# Patient Record
Sex: FEMALE | Race: WHITE | NOT HISPANIC OR LATINO | Employment: UNEMPLOYED | URBAN - METROPOLITAN AREA
[De-identification: names, ages, dates, MRNs, and addresses within clinical notes are randomized per-mention and may not be internally consistent; named-entity substitution may affect disease eponyms.]

---

## 2017-03-23 ENCOUNTER — APPOINTMENT (OUTPATIENT)
Dept: URGENT CARE | Facility: MEDICAL CENTER | Age: 62
End: 2017-03-23
Payer: OTHER MISCELLANEOUS

## 2017-03-23 PROCEDURE — 99213 OFFICE O/P EST LOW 20 MIN: CPT

## 2017-03-29 ENCOUNTER — ALLSCRIPTS OFFICE VISIT (OUTPATIENT)
Dept: OTHER | Facility: OTHER | Age: 62
End: 2017-03-29

## 2017-03-30 ENCOUNTER — TRANSCRIBE ORDERS (OUTPATIENT)
Dept: URGENT CARE | Age: 62
End: 2017-03-30

## 2017-03-30 ENCOUNTER — APPOINTMENT (OUTPATIENT)
Dept: URGENT CARE | Age: 62
End: 2017-03-30
Payer: OTHER MISCELLANEOUS

## 2017-03-30 ENCOUNTER — HOSPITAL ENCOUNTER (OUTPATIENT)
Dept: RADIOLOGY | Age: 62
Discharge: HOME | End: 2017-03-30
Attending: PHYSICIAN ASSISTANT
Payer: OTHER MISCELLANEOUS

## 2017-03-30 DIAGNOSIS — T14.90XA INJURY: ICD-10-CM

## 2017-03-30 DIAGNOSIS — T14.90XA INJURY: Primary | ICD-10-CM

## 2017-03-30 PROCEDURE — 99213 OFFICE O/P EST LOW 20 MIN: CPT | Performed by: PREVENTIVE MEDICINE

## 2017-03-30 PROCEDURE — 73030 X-RAY EXAM OF SHOULDER: CPT

## 2017-04-07 ENCOUNTER — APPOINTMENT (OUTPATIENT)
Dept: URGENT CARE | Age: 62
End: 2017-04-07
Payer: OTHER MISCELLANEOUS

## 2017-04-07 PROCEDURE — 99213 OFFICE O/P EST LOW 20 MIN: CPT | Performed by: FAMILY MEDICINE

## 2017-04-27 ENCOUNTER — ALLSCRIPTS OFFICE VISIT (OUTPATIENT)
Dept: OTHER | Facility: OTHER | Age: 62
End: 2017-04-27

## 2017-04-27 DIAGNOSIS — R11.0 NAUSEA: ICD-10-CM

## 2017-04-27 DIAGNOSIS — M75.82 OTHER SHOULDER LESIONS, LEFT SHOULDER: ICD-10-CM

## 2017-05-04 ENCOUNTER — ALLSCRIPTS OFFICE VISIT (OUTPATIENT)
Dept: OTHER | Facility: OTHER | Age: 62
End: 2017-05-04

## 2017-05-08 RX ORDER — ALPRAZOLAM 0.25 MG/1
0.25 TABLET ORAL 3 TIMES DAILY PRN
COMMUNITY
End: 2020-02-18 | Stop reason: SDUPTHER

## 2017-05-08 RX ORDER — LISINOPRIL 20 MG/1
20 TABLET ORAL 2 TIMES DAILY
COMMUNITY
End: 2020-01-14

## 2017-05-08 RX ORDER — PRAVASTATIN SODIUM 20 MG
20 TABLET ORAL
COMMUNITY
End: 2020-05-19 | Stop reason: SDUPTHER

## 2017-05-08 RX ORDER — BUTALBITAL, ACETAMINOPHEN AND CAFFEINE 50; 325; 40 MG/1; MG/1; MG/1
1 TABLET ORAL EVERY 4 HOURS PRN
COMMUNITY
End: 2018-03-28 | Stop reason: SDUPTHER

## 2017-05-10 ENCOUNTER — ANESTHESIA EVENT (OUTPATIENT)
Dept: PERIOP | Facility: HOSPITAL | Age: 62
End: 2017-05-10
Payer: COMMERCIAL

## 2017-05-10 RX ORDER — AMLODIPINE BESYLATE 5 MG/1
5 TABLET ORAL DAILY
Status: ON HOLD | COMMUNITY
End: 2017-05-11 | Stop reason: CLARIF

## 2017-05-11 ENCOUNTER — HOSPITAL ENCOUNTER (OUTPATIENT)
Facility: HOSPITAL | Age: 62
Setting detail: OUTPATIENT SURGERY
Discharge: HOME | End: 2017-05-11
Attending: INTERNAL MEDICINE | Admitting: INTERNAL MEDICINE
Payer: COMMERCIAL

## 2017-05-11 ENCOUNTER — ANESTHESIA (OUTPATIENT)
Dept: PERIOP | Facility: HOSPITAL | Age: 62
End: 2017-05-11
Payer: COMMERCIAL

## 2017-05-11 ENCOUNTER — GENERIC CONVERSION - ENCOUNTER (OUTPATIENT)
Dept: OTHER | Facility: OTHER | Age: 62
End: 2017-05-11

## 2017-05-11 VITALS
DIASTOLIC BLOOD PRESSURE: 70 MMHG | BODY MASS INDEX: 24.41 KG/M2 | RESPIRATION RATE: 18 BRPM | HEART RATE: 68 BPM | HEIGHT: 60 IN | OXYGEN SATURATION: 100 % | TEMPERATURE: 97.8 F | WEIGHT: 124.34 LBS | SYSTOLIC BLOOD PRESSURE: 138 MMHG

## 2017-05-11 RX ORDER — SODIUM CHLORIDE, SODIUM LACTATE, POTASSIUM CHLORIDE, CALCIUM CHLORIDE 600; 310; 30; 20 MG/100ML; MG/100ML; MG/100ML; MG/100ML
100 INJECTION, SOLUTION INTRAVENOUS CONTINUOUS
Status: DISCONTINUED | OUTPATIENT
Start: 2017-05-11 | End: 2017-05-11 | Stop reason: HOSPADM

## 2017-05-11 RX ORDER — LIDOCAINE HYDROCHLORIDE 10 MG/ML
INJECTION, SOLUTION INFILTRATION; PERINEURAL AS NEEDED
Status: DISCONTINUED | OUTPATIENT
Start: 2017-05-11 | End: 2017-05-11 | Stop reason: SURG

## 2017-05-11 RX ORDER — PROPOFOL 10 MG/ML
INJECTION, EMULSION INTRAVENOUS AS NEEDED
Status: DISCONTINUED | OUTPATIENT
Start: 2017-05-11 | End: 2017-05-11 | Stop reason: SURG

## 2017-05-11 RX ADMIN — PROPOFOL 50 MG: 10 INJECTION, EMULSION INTRAVENOUS at 10:25

## 2017-05-11 RX ADMIN — SODIUM CHLORIDE, SODIUM LACTATE, POTASSIUM CHLORIDE, AND CALCIUM CHLORIDE 100 ML/HR: .6; .31; .03; .02 INJECTION, SOLUTION INTRAVENOUS at 09:52

## 2017-05-11 RX ADMIN — LIDOCAINE HYDROCHLORIDE 50 MG: 10 INJECTION, SOLUTION INFILTRATION; PERINEURAL at 10:20

## 2017-05-11 RX ADMIN — PROPOFOL 150 MG: 10 INJECTION, EMULSION INTRAVENOUS at 10:20

## 2017-05-11 RX ADMIN — PROPOFOL 50 MG: 10 INJECTION, EMULSION INTRAVENOUS at 10:30

## 2017-05-16 ENCOUNTER — HOSPITAL ENCOUNTER (OUTPATIENT)
Dept: RADIOLOGY | Facility: MEDICAL CENTER | Age: 62
Discharge: HOME | End: 2017-05-16
Payer: COMMERCIAL

## 2017-05-16 DIAGNOSIS — R11.0 NAUSEA: ICD-10-CM

## 2017-05-16 PROCEDURE — 76705 ECHO EXAM OF ABDOMEN: CPT

## 2017-05-18 ENCOUNTER — ALLSCRIPTS OFFICE VISIT (OUTPATIENT)
Dept: OTHER | Facility: OTHER | Age: 62
End: 2017-05-18

## 2017-05-19 ENCOUNTER — ALLSCRIPTS OFFICE VISIT (OUTPATIENT)
Dept: OTHER | Facility: OTHER | Age: 62
End: 2017-05-19

## 2017-06-15 ENCOUNTER — ALLSCRIPTS OFFICE VISIT (OUTPATIENT)
Dept: OTHER | Facility: OTHER | Age: 62
End: 2017-06-15

## 2017-07-12 ENCOUNTER — ALLSCRIPTS OFFICE VISIT (OUTPATIENT)
Dept: OTHER | Facility: OTHER | Age: 62
End: 2017-07-12

## 2017-08-03 ENCOUNTER — ALLSCRIPTS OFFICE VISIT (OUTPATIENT)
Dept: OTHER | Facility: OTHER | Age: 62
End: 2017-08-03

## 2017-08-28 ENCOUNTER — ANESTHESIA EVENT (OUTPATIENT)
Dept: PERIOP | Facility: HOSPITAL | Age: 62
End: 2017-08-28
Payer: OTHER MISCELLANEOUS

## 2017-08-28 RX ORDER — PROCHLORPERAZINE MALEATE 10 MG
10 TABLET ORAL EVERY 8 HOURS PRN
COMMUNITY
End: 2018-06-22 | Stop reason: SDUPTHER

## 2017-08-29 ENCOUNTER — HOSPITAL ENCOUNTER (OUTPATIENT)
Facility: HOSPITAL | Age: 62
Setting detail: OUTPATIENT SURGERY
Discharge: HOME | End: 2017-08-29
Attending: ORTHOPAEDIC SURGERY | Admitting: ORTHOPAEDIC SURGERY
Payer: OTHER MISCELLANEOUS

## 2017-08-29 ENCOUNTER — ANESTHESIA (OUTPATIENT)
Dept: PERIOP | Facility: HOSPITAL | Age: 62
End: 2017-08-29
Payer: OTHER MISCELLANEOUS

## 2017-08-29 VITALS
DIASTOLIC BLOOD PRESSURE: 84 MMHG | HEART RATE: 68 BPM | SYSTOLIC BLOOD PRESSURE: 159 MMHG | WEIGHT: 120 LBS | BODY MASS INDEX: 23.56 KG/M2 | RESPIRATION RATE: 18 BRPM | OXYGEN SATURATION: 98 % | TEMPERATURE: 97.8 F | HEIGHT: 60 IN

## 2017-08-29 PROBLEM — S46.212A RUPTURE OF LEFT BICEPS TENDON: Status: ACTIVE | Noted: 2017-08-29

## 2017-08-29 PROBLEM — M75.122 COMPLETE TEAR OF LEFT ROTATOR CUFF: Status: ACTIVE | Noted: 2017-08-29

## 2017-08-29 PROCEDURE — C1713 ANCHOR/SCREW BN/BN,TIS/BN: HCPCS | Performed by: ORTHOPAEDIC SURGERY

## 2017-08-29 DEVICE — IMPLANTABLE DEVICE: Type: IMPLANTABLE DEVICE | Site: SHOULDER | Status: FUNCTIONAL

## 2017-08-29 RX ORDER — MORPHINE SULFATE 2 MG/ML
2 INJECTION, SOLUTION INTRAMUSCULAR; INTRAVENOUS EVERY 4 HOURS PRN
Status: DISCONTINUED | OUTPATIENT
Start: 2017-08-29 | End: 2017-08-29 | Stop reason: HOSPADM

## 2017-08-29 RX ORDER — ONDANSETRON 2 MG/ML
INJECTION INTRAMUSCULAR; INTRAVENOUS AS NEEDED
Status: DISCONTINUED | OUTPATIENT
Start: 2017-08-29 | End: 2017-08-29 | Stop reason: SURG

## 2017-08-29 RX ORDER — ACETAMINOPHEN 325 MG/1
650 TABLET ORAL EVERY 6 HOURS PRN
Status: DISCONTINUED | OUTPATIENT
Start: 2017-08-29 | End: 2017-08-29 | Stop reason: HOSPADM

## 2017-08-29 RX ORDER — ALBUTEROL SULFATE 2.5 MG/3ML
2.5 SOLUTION RESPIRATORY (INHALATION) AS NEEDED
Status: DISCONTINUED | OUTPATIENT
Start: 2017-08-29 | End: 2017-08-29 | Stop reason: HOSPADM

## 2017-08-29 RX ORDER — ONDANSETRON 2 MG/ML
4 INJECTION INTRAMUSCULAR; INTRAVENOUS ONCE AS NEEDED
Status: DISCONTINUED | OUTPATIENT
Start: 2017-08-29 | End: 2017-08-29 | Stop reason: HOSPADM

## 2017-08-29 RX ORDER — FENTANYL CITRATE 50 UG/ML
INJECTION, SOLUTION INTRAMUSCULAR; INTRAVENOUS AS NEEDED
Status: DISCONTINUED | OUTPATIENT
Start: 2017-08-29 | End: 2017-08-29 | Stop reason: SURG

## 2017-08-29 RX ORDER — TRAMADOL HYDROCHLORIDE 50 MG/1
50 TABLET ORAL EVERY 6 HOURS SCHEDULED
Status: DISCONTINUED | OUTPATIENT
Start: 2017-08-29 | End: 2017-08-29 | Stop reason: HOSPADM

## 2017-08-29 RX ORDER — PROPOFOL 10 MG/ML
INJECTION, EMULSION INTRAVENOUS AS NEEDED
Status: DISCONTINUED | OUTPATIENT
Start: 2017-08-29 | End: 2017-08-29 | Stop reason: SURG

## 2017-08-29 RX ORDER — DIPHENHYDRAMINE HYDROCHLORIDE 50 MG/ML
12.5 INJECTION INTRAMUSCULAR; INTRAVENOUS ONCE AS NEEDED
Status: DISCONTINUED | OUTPATIENT
Start: 2017-08-29 | End: 2017-08-29 | Stop reason: HOSPADM

## 2017-08-29 RX ORDER — OXYCODONE HYDROCHLORIDE 5 MG/1
5 TABLET ORAL EVERY 4 HOURS PRN
Status: DISCONTINUED | OUTPATIENT
Start: 2017-08-29 | End: 2017-08-29 | Stop reason: HOSPADM

## 2017-08-29 RX ORDER — FENTANYL CITRATE/PF 50 MCG/ML
25 SYRINGE (ML) INJECTION
Status: DISCONTINUED | OUTPATIENT
Start: 2017-08-29 | End: 2017-08-29 | Stop reason: HOSPADM

## 2017-08-29 RX ORDER — ROPIVACAINE HYDROCHLORIDE 5 MG/ML
INJECTION, SOLUTION EPIDURAL; INFILTRATION; PERINEURAL AS NEEDED
Status: DISCONTINUED | OUTPATIENT
Start: 2017-08-29 | End: 2017-08-29 | Stop reason: SURG

## 2017-08-29 RX ORDER — OXYCODONE HYDROCHLORIDE 5 MG/1
10 TABLET ORAL EVERY 4 HOURS PRN
Status: DISCONTINUED | OUTPATIENT
Start: 2017-08-29 | End: 2017-08-29 | Stop reason: HOSPADM

## 2017-08-29 RX ORDER — PROMETHAZINE HYDROCHLORIDE 25 MG/ML
6.25 INJECTION, SOLUTION INTRAMUSCULAR; INTRAVENOUS AS NEEDED
Status: DISCONTINUED | OUTPATIENT
Start: 2017-08-29 | End: 2017-08-29 | Stop reason: HOSPADM

## 2017-08-29 RX ORDER — MIDAZOLAM HYDROCHLORIDE 1 MG/ML
INJECTION INTRAMUSCULAR; INTRAVENOUS AS NEEDED
Status: DISCONTINUED | OUTPATIENT
Start: 2017-08-29 | End: 2017-08-29 | Stop reason: SURG

## 2017-08-29 RX ORDER — OXYCODONE HYDROCHLORIDE AND ACETAMINOPHEN 5; 325 MG/1; MG/1
1 TABLET ORAL EVERY 4 HOURS PRN
Qty: 60 TABLET | Refills: 0 | Status: CANCELLED | OUTPATIENT
Start: 2017-08-29 | End: 2017-09-08

## 2017-08-29 RX ORDER — EPHEDRINE SULFATE 50 MG/ML
INJECTION, SOLUTION INTRAVENOUS AS NEEDED
Status: DISCONTINUED | OUTPATIENT
Start: 2017-08-29 | End: 2017-08-29 | Stop reason: SURG

## 2017-08-29 RX ORDER — ROCURONIUM BROMIDE 10 MG/ML
INJECTION, SOLUTION INTRAVENOUS AS NEEDED
Status: DISCONTINUED | OUTPATIENT
Start: 2017-08-29 | End: 2017-08-29 | Stop reason: SURG

## 2017-08-29 RX ORDER — LIDOCAINE HYDROCHLORIDE 10 MG/ML
INJECTION, SOLUTION INFILTRATION; PERINEURAL AS NEEDED
Status: DISCONTINUED | OUTPATIENT
Start: 2017-08-29 | End: 2017-08-29 | Stop reason: SURG

## 2017-08-29 RX ORDER — ACETAMINOPHEN 325 MG/1
650 TABLET ORAL EVERY 6 HOURS PRN
Status: DISCONTINUED | OUTPATIENT
Start: 2017-08-29 | End: 2017-08-29

## 2017-08-29 RX ORDER — METOCLOPRAMIDE HYDROCHLORIDE 5 MG/ML
INJECTION INTRAMUSCULAR; INTRAVENOUS AS NEEDED
Status: DISCONTINUED | OUTPATIENT
Start: 2017-08-29 | End: 2017-08-29 | Stop reason: SURG

## 2017-08-29 RX ORDER — ONDANSETRON 2 MG/ML
4 INJECTION INTRAMUSCULAR; INTRAVENOUS EVERY 6 HOURS PRN
Status: DISCONTINUED | OUTPATIENT
Start: 2017-08-29 | End: 2017-08-29 | Stop reason: HOSPADM

## 2017-08-29 RX ORDER — SODIUM CHLORIDE, SODIUM LACTATE, POTASSIUM CHLORIDE, CALCIUM CHLORIDE 600; 310; 30; 20 MG/100ML; MG/100ML; MG/100ML; MG/100ML
125 INJECTION, SOLUTION INTRAVENOUS CONTINUOUS
Status: DISCONTINUED | OUTPATIENT
Start: 2017-08-29 | End: 2017-08-29 | Stop reason: HOSPADM

## 2017-08-29 RX ORDER — GLYCOPYRROLATE 0.2 MG/ML
INJECTION INTRAMUSCULAR; INTRAVENOUS AS NEEDED
Status: DISCONTINUED | OUTPATIENT
Start: 2017-08-29 | End: 2017-08-29 | Stop reason: SURG

## 2017-08-29 RX ADMIN — DEXAMETHASONE SODIUM PHOSPHATE 2 MG: 10 INJECTION INTRAMUSCULAR; INTRAVENOUS at 11:52

## 2017-08-29 RX ADMIN — LIDOCAINE HYDROCHLORIDE 20 MG: 10 INJECTION, SOLUTION INFILTRATION; PERINEURAL at 12:22

## 2017-08-29 RX ADMIN — EPHEDRINE SULFATE 5 MG: 50 INJECTION, SOLUTION INTRAMUSCULAR; INTRAVENOUS; SUBCUTANEOUS at 13:18

## 2017-08-29 RX ADMIN — FENTANYL CITRATE 50 MCG: 50 INJECTION INTRAMUSCULAR; INTRAVENOUS at 11:46

## 2017-08-29 RX ADMIN — GLYCOPYRROLATE 0.4 MG: 0.2 INJECTION, SOLUTION INTRAMUSCULAR; INTRAVENOUS at 14:40

## 2017-08-29 RX ADMIN — FENTANYL CITRATE 25 MCG: 50 INJECTION INTRAMUSCULAR; INTRAVENOUS at 15:56

## 2017-08-29 RX ADMIN — OXYCODONE HYDROCHLORIDE 5 MG: 5 TABLET ORAL at 16:53

## 2017-08-29 RX ADMIN — DEXAMETHASONE SODIUM PHOSPHATE 5 MG: 10 INJECTION INTRAMUSCULAR; INTRAVENOUS at 12:22

## 2017-08-29 RX ADMIN — NEOSTIGMINE METHYLSULFATE 3 MG: 1 INJECTION, SOLUTION INTRAMUSCULAR; INTRAVENOUS; SUBCUTANEOUS at 14:40

## 2017-08-29 RX ADMIN — ROCURONIUM BROMIDE 40 MG: 10 INJECTION, SOLUTION INTRAVENOUS at 12:22

## 2017-08-29 RX ADMIN — SODIUM CHLORIDE, SODIUM LACTATE, POTASSIUM CHLORIDE, AND CALCIUM CHLORIDE 125 ML/HR: .6; .31; .03; .02 INJECTION, SOLUTION INTRAVENOUS at 11:33

## 2017-08-29 RX ADMIN — EPHEDRINE SULFATE 5 MG: 50 INJECTION, SOLUTION INTRAMUSCULAR; INTRAVENOUS; SUBCUTANEOUS at 12:44

## 2017-08-29 RX ADMIN — PROPOFOL 200 MG: 10 INJECTION, EMULSION INTRAVENOUS at 12:22

## 2017-08-29 RX ADMIN — ROPIVACAINE HYDROCHLORIDE 15 ML: 5 INJECTION, SOLUTION EPIDURAL; INFILTRATION; PERINEURAL at 11:52

## 2017-08-29 RX ADMIN — EPHEDRINE SULFATE 10 MG: 50 INJECTION, SOLUTION INTRAMUSCULAR; INTRAVENOUS; SUBCUTANEOUS at 13:39

## 2017-08-29 RX ADMIN — CEFAZOLIN SODIUM 1000 MG: 1 SOLUTION INTRAVENOUS at 12:13

## 2017-08-29 RX ADMIN — EPHEDRINE SULFATE 5 MG: 50 INJECTION, SOLUTION INTRAMUSCULAR; INTRAVENOUS; SUBCUTANEOUS at 13:09

## 2017-08-29 RX ADMIN — FENTANYL CITRATE 50 MCG: 50 INJECTION INTRAMUSCULAR; INTRAVENOUS at 11:48

## 2017-08-29 RX ADMIN — METOCLOPRAMIDE HYDROCHLORIDE 5 MG: 5 INJECTION INTRAMUSCULAR; INTRAVENOUS at 12:32

## 2017-08-29 RX ADMIN — ONDANSETRON 4 MG: 2 INJECTION INTRAMUSCULAR; INTRAVENOUS at 14:41

## 2017-08-29 RX ADMIN — FENTANYL CITRATE 50 MCG: 50 INJECTION INTRAMUSCULAR; INTRAVENOUS at 15:04

## 2017-08-29 RX ADMIN — FENTANYL CITRATE 25 MCG: 50 INJECTION INTRAMUSCULAR; INTRAVENOUS at 15:45

## 2017-08-29 RX ADMIN — FENTANYL CITRATE 50 MCG: 50 INJECTION INTRAMUSCULAR; INTRAVENOUS at 13:58

## 2017-08-29 RX ADMIN — MIDAZOLAM HYDROCHLORIDE 2 MG: 1 INJECTION, SOLUTION INTRAMUSCULAR; INTRAVENOUS at 11:46

## 2017-08-29 RX ADMIN — SODIUM CHLORIDE, SODIUM LACTATE, POTASSIUM CHLORIDE, AND CALCIUM CHLORIDE: .6; .31; .03; .02 INJECTION, SOLUTION INTRAVENOUS at 13:30

## 2017-09-14 ENCOUNTER — ALLSCRIPTS OFFICE VISIT (OUTPATIENT)
Dept: OTHER | Facility: OTHER | Age: 62
End: 2017-09-14

## 2017-09-14 DIAGNOSIS — Z47.89 ENCOUNTER FOR OTHER ORTHOPEDIC AFTERCARE: ICD-10-CM

## 2017-09-19 ENCOUNTER — ALLSCRIPTS OFFICE VISIT (OUTPATIENT)
Dept: OTHER | Facility: OTHER | Age: 62
End: 2017-09-19

## 2017-10-17 ENCOUNTER — ALLSCRIPTS OFFICE VISIT (OUTPATIENT)
Dept: OTHER | Facility: OTHER | Age: 62
End: 2017-10-17

## 2017-10-19 ENCOUNTER — ALLSCRIPTS OFFICE VISIT (OUTPATIENT)
Dept: OTHER | Facility: OTHER | Age: 62
End: 2017-10-19

## 2017-10-19 DIAGNOSIS — M75.50 BURSITIS OF SHOULDER: ICD-10-CM

## 2017-10-19 DIAGNOSIS — Z47.89 ENCOUNTER FOR OTHER ORTHOPEDIC AFTERCARE (CODE): ICD-10-CM

## 2017-10-19 NOTE — PROGRESS NOTES
Chief Complaint  botox inj for chronic migraine      Current Meds  1  ALPRAZolam 0 25 MG Oral Tablet; Therapy: (Recorded:26Mar2014) to Recorded  2  B Complex CAPS; take 1 capsule daily; Therapy: (Recorded:19Sep2017) to Recorded  3  Botox 200 UNIT Injection Solution Reconstituted; INJECT UP  UNITS ONCE   EVERY 3 MONTHS IN  MD OFFICE; Therapy: 71UIX5159 to (Last Rx:23Nov2016)  Requested for: 94IFW3110 Ordered  4  Butalbital-APAP-Caffeine -40 MG Oral Tablet; TAKE 1OR 2 TABLETS EVERY 4   HOURS FOR SEVERE HEADACHE, MAX 8 PER WEEK; Therapy: 91LLL3757 to (Evaluate:19Oct2017); Last Rx:19Sep2017 Ordered  5  Calcium-Magnesium-Zinc 333-133-5 MG Oral Tablet; Therapy: (Recorded:19Sep2017) to Recorded  6  Cyclobenzaprine HCl - 10 MG Oral Tablet; 1 tab q hs;   Therapy: 19Sep2017 to (Fabio Ocampo)  Requested for: 05AGN8319; Last   Rx:03Oct2017 Ordered  7  Dexamethasone 0 5 MG Oral Tablet; TAKE 1 TABLET EVERY MORNING; Therapy: 09Wah3810 to (Evaluate:24Sep2017)  Requested for: 96Raj6919; Last   Rx:19Sep2017 Ordered  8  Dexamethasone 2 MG Oral Tablet; 1 tab daily in am for 3 day; Therapy: 70QME2372 to (21 )  Requested for: 05YWK6048; Last   Rx:03Oct2017 Ordered  9  EpiPen 2-Tima 0 3 MG/0 3ML Injection Solution Auto-injector; Therapy: 68LAR5109 to Recorded  10  Lisinopril 20 MG Oral Tablet; TAKE 1 TABLET TWICE DAILY; Therapy: (Recorded:19Sep2017) to Recorded  11  Metoprolol Tartrate 25 MG Oral Tablet; Take 1/2 tab bid; Therapy: (Recorded:19Sep2017) to Recorded  12  Omega 3 CAPS; TAKE AS DIRECTED; Therapy: (Recorded:19Sep2017) to Recorded  13  Pravastatin Sodium 20 MG Oral Tablet; Therapy: (Recorded:26Mar2014) to Recorded  14  Prochlorperazine Maleate 10 MG Oral Tablet; TAKE 1 TABLET 3 TIMES DAILY; Therapy: (Recorded:19Sep2017) to Recorded  15  Sucralfate 1 GM Oral Tablet; take 1/2 hour prior to taking decadron;     Therapy: 43OGR7907 to (Evaluate:72Vzr4134)  Requested for: 27KJK1206; Last    Rx:03Oct2017 Ordered  16  Vicodin 5-300 MG Oral Tablet; TAKE 1 TO 2 TABLETS EVERY 4 TO 6 HOURS AS    NEEDED FOR PAIN;    Therapy: 57Rrr5212 to (Evaluate:78Ocu5122); Last Rx:46Gka7522 Ordered  17  Vitamin D TABS; Take 1 tablet daily; Therapy: (Recorded:56Rze1502) to Recorded    Allergies  1  Advil TABS  2  Ibuprofen 200 TABS  3  iodine  4  Naproxen Sodium TABS  5  Eggs    Vitals   Recorded: 98CQB0667 08:28AM   Temperature 01 7 F   Systolic 352   Diastolic 80     Procedure  Procedure: Headache botox injection  Indication: Chronic migraine headache  Risks, benefits and alternatives were discussed with the patient  We discussed possible complications, including infection,-- bleeding-- and-- allergic reaction  Written consent was obtained prior to the procedure  The site was prepped with an alcohol swab  Anesthesia: No anesthesia was needed  Procedure Note: The patient was placed in the upright position  200 --Mml of Botox-A and  EMG guidance was not used in identifying the injection site  5 unit(s) was injected into the procerus muscle  5 unit(s) was injected into the  right  muscle  5 unit(s) was injected into the  left  muscle  10 unit(s) was injected into the  right frontalis muscle  10 unit(s) was injected into the  left frontalis muscle  25 unit(s) was injected into the  right temporalis muscle  25 unit(s) was injected into the  left temporalis muscle  15 unit(s) was injected into the  right occipitalis muscle  15 unit(s) was injected into the  left occipitalis muscle  10 unit(s) was injected into the  right cervical paraspinal muscle  10 unit(s) was injected into the  left cervical paraspinal muscle  15 unit(s) was injected into the  right trapezius muscle  15 unit(s) was injected into the  left trapezius muscle  45 units on the left scalp, medically necessary  A total of 200units were used  A total of 0units were discarded     Botox Lot:  Lot number: [id number] -- Expiration date: APR 2020  (200 UNITS/VIALS)   Patient Status:  the patient tolerated the procedure well  Complications:  there were no complications  Follow-up in the office in 3 month(s)  100 UNITS/ 2CC SALINE X2      Assessment  1  Chronic migraine without aura (346 70) (G43 709)    Plan   Cervicalgia, Chronic migraine without aura    · Start: Diclofenac Sodium 1 % Transdermal Gel (Voltaren); Apply small amount to affected  area TID prn pain  Rx By: Melissa Glass; Dispense: 0 Days ; #:1 X 100 GM Tube; Refill: 0;For: Cervicalgia,   Chronic migraine without aura; FABIANA = N; Print Rx  Chronic migraine without aura    · Administered: Botox 200 UNIT Injection Solution Reconstituted  Rx By: Deyvi COBOS;For: Chronic migraine without aura; Dose of 200 UNIT;   Intramuscular; FABIANA = N; Administered by: Yousuf La MA: 10/17/2017 8:40:00 AM   · Chemodenervation of muscles innervated by facial, trigeminal, c-spine, accessory  nerves - POC; Status:Need Information - Financial Authorization; Requested  ZYR:[id number];   Perform: In Office; Due:14Qac7827; Ordered; For:Chronic migraine without aura; Ordered   By:Renee Jenkins;  Chronic migraine without aura, Migraine, unspecified, not intractable, without status  migrainosus    · Renew: Butalbital-APAP-Caffeine -40 MG Oral Tablet; TAKE 1OR 2 TABLETS  EVERY 4 HOURS FOR SEVERE HEADACHE, MAX 8 PER WEEK  Rx By: Melissa Glass; Dispense: 30 Days ; #:15 Tablet; Refill: 0;For: Chronic migraine   without aura, Migraine, unspecified, not intractable, without status migrainosus; FABIANA =   N; Print Rx; Msg to Pharmacy: to fill in november 2017    Follow-up visit in 3 months Evaluation and Treatment  Follow-up  Status: Hold For - Scheduling  Requested for: 41HJD6953  Ordered; For: Chronic migraine without aura;   Ordered By: Melissa Glass  Performed:   Due: 86VZN1853     Discussion/Summary    I updated the patient in the [place] Drug Monitoring Program appears to be getting Fioricet from multiple providers, at least 2 others  We will need to limit prescription of this medication to her in the future  gel tid prn left neck pain  was given the option to f/u with Dr Prema Espitia or Angelique Boothe PA-C, however she prefers the [facility] since it is closer  Future Appointments    Date/Time Provider Specialty Site   04/05/2018 08:30 AM Lisebth Diego MD Neurology [place] [address]   10/19/2017 08:50 AM Madelyn Bosworth, DO Orthopedic Surgery [facility] MEDICAL [facility]   11/27/2017 10:30 AM Cyrus Staples MD Obstetrics/Gynecology [facility]     Signatures   Electronically signed by :  Lorraine Monaco, [facility]; Oct 17 2017  9:38AM EST                       (Author)    Electronically signed by : Rudy Clarke MD; Oct 18 2017  2:12PM EST                       (Author)

## 2017-10-20 ENCOUNTER — GENERIC CONVERSION - ENCOUNTER (OUTPATIENT)
Dept: OTHER | Facility: OTHER | Age: 62
End: 2017-10-20

## 2017-10-20 NOTE — PROGRESS NOTES
Assessment  1  Rotator cuff tendinitis, left (726 10) (M75 82)   2  Migraine, unspecified, not intractable, without status migrainosus (346 90) (G43 909)   3  Subacromial bursitis (726 19) (M75 50)    Plan  Subacromial bursitis    · Ketorolac Tromethamine 10 MG Oral Tablet; TAKE 1 TABLET EVERY 8 HOURS AS  NEEDED   · *1 - [facility]  *  Status: Active  Requested for: 39MRK3447  Care Summary provided  : Yes    Discussion/Summary    Right shoulder rotator cuff repairContinue with physical therapy we have discharged the immobilizer brace  I prescribed her some Toradol she does have some issues with her GI upset with anti-inflammatories a told her to try a if she has any GI upset she is to stop immediately  She is taking medicine with food  I have only given her 9 pills I have told her not to take all 9 in a row but to have it later if needed  Follow up with us in 4 weeks  Chief Complaint  1  Shoulder Pain    Post-Op  HPI: Patient comes in with regards to her left shoulder she had a rotator cuff repair subacromial decompression  She is doing well but she still has some pain she has some irritation from wearing the sling for such a long period time  She has some migraines  Active Problems  1  AC (acromioclavicular) joint arthritis (716 91) (M19 019)   2  Aftercare following other surgery of musculoskeletal system (V58 78) (Z47 89)   3  Anxiety (300 00) (F41 9)   4  Arthritis (716 90) (M19 90)   5  Biceps tendinitis, left (726 12) (M75 22)   6  Cervical radiculopathy (723 4) (M54 12)   7  Cervicalgia (723 1) (M54 2)   8  Chronic migraine without aura (346 70) (G43 709)   9  Degeneration of cervical intervertebral disc (722 4) (M50 90)   10  Depression (Symptom) (311)   11  Dizziness (780 4) (R42)   12  Elevated transaminase level (790 4) (R74 0)   13  Encounter for gynecological examination without abnormal finding (V72 31) (Z01 419)   14   Encounter for screening mammogram for malignant neoplasm of breast (V76 12)    (Z12 31)   15  Herniated cervical disc (722 0) (M50 20)   16  Hyperlipoproteinemia (272 4) (E78 5)   17  Hypertension (401 9) (I10)   18  Insomnia (780 52) (G47 00)   19  Iron deficiency anemia (280 9) (D50 9)   20  Lower back pain (724 2) (M54 5)   21  Memory changes (780 93) (R41 3)   22  Migraine, unspecified, not intractable, without status migrainosus (346 90) (G43 909)   23  Myofascial pain syndrome (729 1) (M79 1)   24  Nausea (787 02) (R11 0)   25  Neck pain (723 1) (M54 2)   26  Osteoarthritis of left glenohumeral joint (715 91) (M19 012)   27  Palpitations (785 1) (R00 2)   28  Postconcussion syndrome (310 2) (F07 81)   29  Rotator cuff tendinitis, left (726 10) (M75 82)   30  Rupture of biceps tendon, left, initial encounter (840 8) (S46 112A)   31  Subacromial bursitis (726 19) (M75 50)   32  Tendinitis of left rotator cuff (726 10) (M75 82)    Social History   · Denied: History of Alcohol Use (History)   · Caffeine Use   · Denied: History of Drug Use   · Education history   · BA   · Marital History - Currently    · Never A Smoker   · Occupation   ·     Current Meds   1  ALPRAZolam 0 25 MG Oral Tablet; Therapy: (Recorded:26Mar2014) to Recorded   2  B Complex CAPS; take 1 capsule daily; Therapy: (Recorded:95Hvx3201) to Recorded   3  Botox 200 UNIT Injection Solution Reconstituted; INJECT UP  UNITS ONCE   EVERY 3 MONTHS IN  MD OFFICE; Therapy: 54GQD2031 to (Last Rx:23Nov2016)  Requested for: 62TWC0413 Ordered   4  Butalbital-APAP-Caffeine -40 MG Oral Tablet; TAKE 1OR 2 TABLETS EVERY 4   HOURS FOR SEVERE HEADACHE, MAX 8 PER WEEK; Therapy: 03Fiv3107 to (Evaluate:16Nov2017); Last Rx:17Oct2017 Ordered   5  Calcium-Magnesium-Zinc 333-133-5 MG Oral Tablet; Therapy: (Recorded:19Sep2017) to Recorded   6   Cyclobenzaprine HCl - 10 MG Oral Tablet; 1 tab q hs;   Therapy: 19Sep2017 to (05 06 52 16 25)  Requested for: 58PJS6874; Last Rx: 12JXJ6651 Ordered   7  Dexamethasone 0 5 MG Oral Tablet; TAKE 1 TABLET EVERY MORNING; Therapy: 67Emr6046 to (Evaluate:36Ymx5646)  Requested for: 04Ftg6309; Last   Rx:19Sep2017 Ordered   8  Dexamethasone 2 MG Oral Tablet; 1 tab daily in am for 3 day; Therapy: 85KNL9501 to (20 873 135)  Requested for: 80YGI7733; Last   Rx:03Oct2017 Ordered   9  Diclofenac Sodium 1 % Transdermal Gel (Voltaren); Apply small amount to affected area   TID prn pain; Therapy: 40LBN4774 to (Last Rx:17Oct2017) Ordered   10  EpiPen 2-Tima 0 3 MG/0 3ML Injection Solution Auto-injector; Therapy: 05HQF3367 to Recorded   11  Lisinopril 20 MG Oral Tablet; TAKE 1 TABLET TWICE DAILY; Therapy: (Recorded:18Cuo0532) to Recorded   12  Metoprolol Tartrate 25 MG Oral Tablet; Take 1/2 tab bid; Therapy: (Recorded:19Sep2017) to Recorded   13  Omega 3 CAPS; TAKE AS DIRECTED; Therapy: (Recorded:19Sep2017) to Recorded   14  Pravastatin Sodium 20 MG Oral Tablet; Therapy: (Recorded:26Mar2014) to Recorded   15  Prochlorperazine Maleate 10 MG Oral Tablet; TAKE 1 TABLET 3 TIMES DAILY; Therapy: (Recorded:74Tmu0956) to Recorded   16  Sucralfate 1 GM Oral Tablet; take 1/2 hour prior to taking decadron; Therapy: 09XMM5236 to (02 033 135)  Requested for: 93DHB4955; Last    Rx:03Oct2017 Ordered   17  Vicodin 5-300 MG Oral Tablet; TAKE 1 TO 2 TABLETS EVERY 4 TO 6 HOURS AS    NEEDED FOR PAIN;    Therapy: 14Sep2017 to (Evaluate:22Rvi3537); Last Rx:61Nxs5957 Ordered   18  Vitamin D TABS; Take 1 tablet daily; Therapy: (Recorded:29Gml5216) to Recorded    Allergies  1  Advil TABS   2  Ibuprofen 200 TABS   3  iodine   4  Naproxen Sodium TABS  5  Eggs    Vitals   Recorded: 19Oct2017 09:02AM   Heart Rate 55   Systolic 839   Diastolic 95   Height 5 ft    Weight 128 lb    BMI Calculated 25   BSA Calculated 1 54     Physical Exam  Range of motion medially get her to about 100° of flexion  She has pain once a go beyond this    External rotation is still     Passive      Results/Data    Limited as well  I personally reviewed the films/images/results in the office today  My interpretation follows  Diagnostic Review I went over the MRIs with the patient        Future Appointments    Date/Time Provider Specialty Site   04/05/2018 08:30 AM aMgda Kelley MD Neurology [facility]   11/27/2017 10:30 AM Billy Li MD Obstetrics/Gynecology [facility]     Signatures   Electronically signed by : Jaja Guzman DO; Oct 19 2017  9:19AM EST                       (Author)
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
## 2017-11-14 ENCOUNTER — GENERIC CONVERSION - ENCOUNTER (OUTPATIENT)
Dept: OTHER | Facility: OTHER | Age: 62
End: 2017-11-14

## 2017-11-15 ENCOUNTER — ALLSCRIPTS OFFICE VISIT (OUTPATIENT)
Dept: OTHER | Facility: OTHER | Age: 62
End: 2017-11-15

## 2017-12-28 ENCOUNTER — ALLSCRIPTS OFFICE VISIT (OUTPATIENT)
Dept: OTHER | Facility: OTHER | Age: 62
End: 2017-12-28

## 2017-12-28 DIAGNOSIS — M75.82 OTHER SHOULDER LESIONS, LEFT SHOULDER: ICD-10-CM

## 2018-01-03 ENCOUNTER — GENERIC CONVERSION - ENCOUNTER (OUTPATIENT)
Dept: OBGYN CLINIC | Facility: CLINIC | Age: 63
End: 2018-01-03

## 2018-01-11 NOTE — MISCELLANEOUS
Message  Return to work or school:   Cony Zelaya is under my professional care  She was seen in my office on 9/14/2017    She is not able to return to work until to be determined until follow up in 4 weeks      Niyah Jimenez DO        Future Appointments    Signatures   Electronically signed by : Marianela Arevalo, [facility]; Sep 14 2017  9:25AM EST                       (Author)    Electronically signed by : Niyah Jimenez DO; Sep 14 2017  9:31AM EST                       (Author)

## 2018-01-12 VITALS
BODY MASS INDEX: 24.54 KG/M2 | HEIGHT: 60 IN | SYSTOLIC BLOOD PRESSURE: 182 MMHG | DIASTOLIC BLOOD PRESSURE: 103 MMHG | WEIGHT: 125 LBS | HEART RATE: 54 BPM

## 2018-01-12 NOTE — MISCELLANEOUS
Message  Return to work or school:   Karan Colby is under my professional care  She was seen in my office on 11/15/2017    She is not able to return to work until Follow-up in 6 weeks      Harrison BLACKMON        Future Appointments    Signatures   Electronically signed by : Jus Nelson DO; Nov 15 2017 11:07AM EST                       (Author)    Electronically signed by : Jus Nelson DO; Nov 15 2017 11:10AM EST                       (Author)

## 2018-01-13 VITALS
DIASTOLIC BLOOD PRESSURE: 91 MMHG | WEIGHT: 127.38 LBS | SYSTOLIC BLOOD PRESSURE: 199 MMHG | BODY MASS INDEX: 25.01 KG/M2 | HEIGHT: 60 IN | RESPIRATION RATE: 16 BRPM | HEART RATE: 55 BPM

## 2018-01-13 VITALS
WEIGHT: 124.25 LBS | DIASTOLIC BLOOD PRESSURE: 70 MMHG | HEART RATE: 72 BPM | HEIGHT: 60 IN | RESPIRATION RATE: 18 BRPM | SYSTOLIC BLOOD PRESSURE: 118 MMHG | BODY MASS INDEX: 24.39 KG/M2

## 2018-01-13 VITALS
WEIGHT: 124.25 LBS | HEIGHT: 60 IN | BODY MASS INDEX: 24.39 KG/M2 | DIASTOLIC BLOOD PRESSURE: 92 MMHG | SYSTOLIC BLOOD PRESSURE: 172 MMHG | HEART RATE: 52 BPM

## 2018-01-13 VITALS
HEIGHT: 60 IN | HEART RATE: 56 BPM | WEIGHT: 124.25 LBS | BODY MASS INDEX: 24.39 KG/M2 | SYSTOLIC BLOOD PRESSURE: 194 MMHG | DIASTOLIC BLOOD PRESSURE: 96 MMHG

## 2018-01-13 VITALS
DIASTOLIC BLOOD PRESSURE: 91 MMHG | WEIGHT: 124.25 LBS | BODY MASS INDEX: 24.39 KG/M2 | HEIGHT: 60 IN | SYSTOLIC BLOOD PRESSURE: 179 MMHG | HEART RATE: 57 BPM

## 2018-01-13 VITALS
BODY MASS INDEX: 25.13 KG/M2 | HEART RATE: 55 BPM | WEIGHT: 128 LBS | SYSTOLIC BLOOD PRESSURE: 177 MMHG | HEIGHT: 60 IN | DIASTOLIC BLOOD PRESSURE: 95 MMHG

## 2018-01-13 VITALS — DIASTOLIC BLOOD PRESSURE: 70 MMHG | SYSTOLIC BLOOD PRESSURE: 163 MMHG | TEMPERATURE: 97.5 F

## 2018-01-14 VITALS — DIASTOLIC BLOOD PRESSURE: 80 MMHG | TEMPERATURE: 97.8 F | SYSTOLIC BLOOD PRESSURE: 122 MMHG

## 2018-01-14 VITALS
SYSTOLIC BLOOD PRESSURE: 159 MMHG | WEIGHT: 124 LBS | BODY MASS INDEX: 24.35 KG/M2 | HEIGHT: 60 IN | DIASTOLIC BLOOD PRESSURE: 85 MMHG | HEART RATE: 60 BPM

## 2018-01-14 VITALS — HEART RATE: 52 BPM | TEMPERATURE: 97.6 F | RESPIRATION RATE: 16 BRPM

## 2018-01-16 ENCOUNTER — ALLSCRIPTS OFFICE VISIT (OUTPATIENT)
Dept: OTHER | Facility: OTHER | Age: 63
End: 2018-01-16

## 2018-01-17 NOTE — MISCELLANEOUS
Message  Return to work or school:   Lucas Comp is under my professional care  She was seen in my office on 10/19/2017    She is not able to return to work until Further notice  Harrison BLACKMON        Signatures   Electronically signed by : Peyman Travis DO; Oct 19 2017  9:22AM EST                       (Author)

## 2018-01-22 VITALS — SYSTOLIC BLOOD PRESSURE: 134 MMHG | TEMPERATURE: 97.8 F | DIASTOLIC BLOOD PRESSURE: 72 MMHG

## 2018-01-23 NOTE — PROCEDURES
Chief Complaint   botox inj for chronic migraine      Current Meds    1  ALPRAZolam 0 25 MG Oral Tablet; Therapy: (Recorded:26Mar2014) to Recorded   2  B Complex CAPS; take 1 capsule daily; Therapy: (Recorded:19Sep2017) to Recorded   3  Botox 200 UNIT Injection Solution Reconstituted; INJECT UP  UNITS ONCE     EVERY 3 MONTHS IN  MD OFFICE; Therapy: 54NIT1593 to (Last Rx:23Nov2016)  Requested for: 65Gbv8680 Ordered   4  Butalbital-APAP-Caffeine -40 MG Oral Tablet; TAKE 1OR 2 TABLETS EVERY 4     HOURS FOR SEVERE HEADACHE, MAX 8 PER WEEK; Therapy: 75Rdl3595 to (Evaluate:16Nov2017); Last Rx:17Oct2017 Ordered   5  Calcium-Magnesium-Zinc 333-133-5 MG Oral Tablet; Therapy: (Recorded:19Sep2017) to Recorded   6  Cyclobenzaprine HCl - 10 MG Oral Tablet; 1 tab q hs;     Therapy: 28Lce5332 to (Js Jung)  Requested for: 39YKD1955; Last     Rx:03Oct2017 Ordered   7  Dexamethasone 0 5 MG Oral Tablet; TAKE 1 TABLET EVERY MORNING; Therapy: 59Ffl7747 to (Evaluate:24Sep2017)  Requested for: 92Hce1325; Last     Rx:19Sep2017 Ordered   8  Dexamethasone 2 MG Oral Tablet; 1 tab daily in am for 3 day; Therapy: 45OPE1423 to ([phone])  Requested for: 55GVG0622; Last     Rx:03Oct2017 Ordered   9  Diclofenac Sodium 1 % Transdermal Gel; Apply small amount to affected area TID prn     pain; Therapy: 50CZR7366 to (Last Rx:17Oct2017) Ordered   10  EpiPen 2-Tima 0 3 MG/0 3ML Injection Solution Auto-injector; Therapy: 17TTF9064 to Recorded   11  Ketorolac Tromethamine 10 MG Oral Tablet; TAKE 1 TABLET EVERY 8 HOURS AS      NEEDED; Therapy: 83FSW2588 to ([phone])  Requested for: 64MES8703; Last      Rx:19Oct2017 Ordered   12  Lisinopril 20 MG Oral Tablet; TAKE 1 TABLET TWICE DAILY; Therapy: (Recorded:31Ceh1612) to Recorded   13  Metoprolol Tartrate 25 MG Oral Tablet; Take 1/2 tab bid; Therapy: (Recorded:19Sep2017) to Recorded   14   Omega 3 CAPS; TAKE AS DIRECTED; Therapy: (Recorded:10Dfr6975) to Recorded   15  Pravastatin Sodium 20 MG Oral Tablet; Therapy: (Recorded:26Mar2014) to Recorded   16  Prochlorperazine Maleate 10 MG Oral Tablet; TAKE 1 TABLET 3 TIMES DAILY; Therapy: (Recorded:86Rpt7831) to Recorded   17  Sucralfate 1 GM Oral Tablet; take 1/2 hour prior to taking decadron; Therapy: 89XBR0619 to ([phone])  Requested for: 26UWL1641; Last      Rx:75Ntk1946 Ordered   18  Tramadol-Acetaminophen 37 5-325 MG Oral Tablet; ONE TABLET TWICE A DAY AS      NEEDED; Therapy: 83RSH1724 to (Last Rx:97Fvu0301) Ordered   19  Vicodin 5-300 MG Oral Tablet; TAKE 1 TO 2 TABLETS EVERY 4 TO 6 HOURS AS      NEEDED FOR PAIN;      Therapy: 24Qbe7173 to (Evaluate:33Dzn3876); Last Rx:07Tgc5231 Ordered   20  Vitamin D TABS; Take 1 tablet daily; Therapy: (Recorded:23Eef9508) to Recorded    Allergies   1  Advil TABS   2  Ibuprofen 200 TABS   3  iodine   4  Naproxen Sodium TABS  5  Eggs    Vitals    Recorded: 30ACM0018 09:10AM   Temperature 51 2 F   Systolic 465   Diastolic 72     Procedure   Procedure: Headache botox injection  Indication: Chronic migraine headache  Risks, benefits and alternatives were discussed with the patient  We discussed possible complications, including infection,-- bleeding-- and-- allergic reaction  Written consent was obtained prior to the procedure  The site was prepped with an alcohol swab  Anesthesia: No anesthesia was needed  Procedure Note: The patient was placed in the upright position  200 --Mml of Botox-A and  EMG guidance was not used in identifying the injection site  5 unit(s) was injected into the procerus muscle  5 unit(s) was injected into the  right  muscle  5 unit(s) was injected into the  left  muscle  10 unit(s) was injected into the  right frontalis muscle  10 unit(s) was injected into the  left frontalis muscle      25 unit(s) was injected into the  right temporalis muscle  25 unit(s) was injected into the  left temporalis muscle  15 unit(s) was injected into the  right occipitalis muscle  15 unit(s) was injected into the  left occipitalis muscle  10 unit(s) was injected into the  right cervical paraspinal muscle  10 unit(s) was injected into the  left cervical paraspinal muscle  15 unit(s) was injected into the  right trapezius muscle  15 unit(s) was injected into the  left trapezius muscle  45 units on the left scalp, medically necessary  A total of 200units were used  A total of 0units were discarded  Botox Lot:  Lot number: [id number], [id number] -- Expiration date: jul 2020, jun 2020  (200 UNITS/VIALS)      Patient Status:  the patient tolerated the procedure well  Complications:  there were no complications  Follow-up in the office in 3 month(s)  100 UNITS/ 2CC SALINE X2      Assessment   1  Chronic migraine without aura (346 70) (G43 709)    Plan    Chronic migraine without aura    · Botox 100 UNIT Injection Solution Reconstituted   Rx By: Prashant COBOS;For: Chronic migraine without aura; Dose of 100 UNIT; Injection; FABIANA = N; Administered by: Mekhi Ballard MA: 01/16/2018 11:11:00 AM   · Botox 100 UNIT Injection Solution Reconstituted   Rx By: Prashant COBOS;For: Chronic migraine without aura; Dose of 100 UNIT; Injection; FABIANA = N; Administered by: Mekhi Ballard MA: 01/16/2018 11:13:00 AM   · Follow-up visit in 3 months Evaluation and Treatment  Follow-up  Status: Hold For -    Scheduling  Requested for: 07DJY4941   Ordered; For: Chronic migraine without aura; Ordered By: Neida Pederson Performed:  Due: 78SNQ9118   · Chemodenervation of muscles innervated by facial, trigeminal, c-spine, accessory    nerves - POC; Status:Need Information - Financial Authorization; Requested    RXD:[id number]; Perform: In Office; [facility]:14YVH7646;JRTVAYS; For:Chronic migraine without aura;  Ordered By:Lev Jenkins; Butalbital-APAP-Caffeine -40 MG Oral Tablet; TAKE 1OR 2 TABLETS EVERY 4 HOURS FOR SEVERE HEADACHE, MAX 8 PER WEEK; Therapy: 63DCN9851 to (Evaluate:73Jpv8738); Last Rx:16Jan2018; Status: ACTIVE Ordered     Rx By: Charity Leach; Dispense: 30 Days ; #:15 Tablet; Refill: 0;      For: Chronic migraine without aura, Migraine, unspecified, not intractable, without status migrainosus; FABIANA = N; Call Rx        Annotations                 rx called in, paper copy destroyed  rx called in       Future Appointments      Date/Time Provider Specialty Site   04/05/2018 08:30 AM Jena Byers MD Neurology [place] [address]   02/08/2018 10:40 AM Ling Mariano DO Orthopedic Surgery [facility] [address]     Signatures    Electronically signed by :  Broderick Bennett, [facility]; Jan 16 2018 11:30AM EST                       (Author)     Electronically signed by : Sloane Mariano MD; Jan 29 2018  4:15PM EST                       (Co-author)

## 2018-01-23 NOTE — MISCELLANEOUS
Message  Return to work or school:   Xavier Rodriguez is under my professional care  She was seen in my office on 12/28/17       Out of work until next evaluation in six weeks  Madisyn Freitas PA-C        Signatures   Electronically signed by : Madisyn Freitas, [facility]; Dec 28 2017  4:13PM EST                       (Author)    Electronically signed by : Mikhail Munoz DO; Dec 28 2017  4:16PM EST                       (Author)

## 2018-01-24 VITALS
DIASTOLIC BLOOD PRESSURE: 96 MMHG | HEART RATE: 65 BPM | WEIGHT: 128 LBS | HEIGHT: 60 IN | SYSTOLIC BLOOD PRESSURE: 175 MMHG | BODY MASS INDEX: 25.13 KG/M2

## 2018-02-06 ENCOUNTER — TELEPHONE (OUTPATIENT)
Dept: NEUROLOGY | Facility: CLINIC | Age: 63
End: 2018-02-06

## 2018-02-06 DIAGNOSIS — G43.709 CHRONIC MIGRAINE WITHOUT AURA WITHOUT STATUS MIGRAINOSUS, NOT INTRACTABLE: Primary | ICD-10-CM

## 2018-02-06 RX ORDER — PROCHLORPERAZINE MALEATE 10 MG
TABLET ORAL
Qty: 10 TABLET | Refills: 0 | Status: SHIPPED | OUTPATIENT
Start: 2018-02-06 | End: 2018-07-24

## 2018-02-06 NOTE — TELEPHONE ENCOUNTER
Patient called to request something to help with her nausea  She reports her migraines typically start with nausea and she has been nauseous for 8 days  She would like this sent to the local pharmacy

## 2018-02-06 NOTE — TELEPHONE ENCOUNTER
Sent in compazine for her, limit 3/week or 10/month  Does she have a migraine currently?   If not then would recommend calling PCP as there are some viruses going around

## 2018-02-07 NOTE — TELEPHONE ENCOUNTER
Called pt back states that Compazine was effective  Denies migraine or nausea at this time  Pt stated, " whenerver I get migraine I feel nauseous  Decline to see her PCP at this time since she feels better   Transferred call to Yanique Grier for her botox appt per pt's request

## 2018-02-08 ENCOUNTER — OFFICE VISIT (OUTPATIENT)
Dept: OBGYN CLINIC | Facility: MEDICAL CENTER | Age: 63
End: 2018-02-08
Payer: OTHER MISCELLANEOUS

## 2018-02-08 VITALS
SYSTOLIC BLOOD PRESSURE: 166 MMHG | HEIGHT: 60 IN | HEART RATE: 64 BPM | DIASTOLIC BLOOD PRESSURE: 76 MMHG | WEIGHT: 128 LBS | BODY MASS INDEX: 25.13 KG/M2

## 2018-02-08 DIAGNOSIS — M75.122 COMPLETE TEAR OF LEFT ROTATOR CUFF: Primary | ICD-10-CM

## 2018-02-08 DIAGNOSIS — S46.212D RUPTURE OF LEFT BICEPS TENDON, SUBSEQUENT ENCOUNTER: ICD-10-CM

## 2018-02-08 PROCEDURE — 99212 OFFICE O/P EST SF 10 MIN: CPT | Performed by: ORTHOPAEDIC SURGERY

## 2018-02-08 NOTE — PROGRESS NOTES
Assessment:  1  Complete tear of left rotator cuff     2  Rupture of left biceps tendon, subsequent encounter       Patient Active Problem List   Diagnosis    Complete tear of left rotator cuff    Rupture of left biceps tendon           Plan      FCE will be ordered    Hold off on physical therapy   I talked to the patient that there is a possibility that the repair failed she Re tore at or this is the ultimate outcome due to the fact that the shoulder rotator cuff may not be able to get stronger than it is because it has been awhile since the original  Injury  What I would look at is place her on the FCE restrictions and then out a year repeat the MRI to see if there is a retear  Subjective:     Patient ID:    Chief Complaint:Katerina Vivar 58 y o  female      HPI     patient comes in today she is now 6 months out from her rotator cuff repair  She reports that she is not doing very well  She still has weakness and pain in the arm  She is doing physical therapy and that seems to be just aggravating her issues  She does have some pain that goes down into the forearm and thumb and lateral aspect of the shoulder  The following portions of the patient's history were reviewed and updated as appropriate: allergies, current medications, past family history, past social history, past surgical history and problem list         Social History     Social History    Marital status: /Civil Union     Spouse name: N/A    Number of children: N/A    Years of education: N/A     Occupational History    Not on file       Social History Main Topics    Smoking status: Former Smoker     Quit date: 1990    Smokeless tobacco: Never Used    Alcohol use No    Drug use: No    Sexual activity: Not on file     Other Topics Concern    Not on file     Social History Narrative    No narrative on file     Past Medical History:   Diagnosis Date    Anxiety     Arthritis     Depression     Hyperlipidemia     Hypertension     Migraine      Past Surgical History:   Procedure Laterality Date     SECTION      COLONOSCOPY      [place] ESOPHAGOGASTRODUODENOSCOPY TRANSORAL DIAGNOSTIC N/A 2017    Procedure: EGD AND COLONOSCOPY;  Surgeon: Lore Perez MD;  [facility]; Service: Gastroenterology    [place] [address] ARTHROSCOP,SURG,W/ROTAT CUFF REPR Left 2017    Procedure: SHOULDER ARTHROSCOPY ROTATOR CUFF REPAIR; SUBACROMIAL DECOMPRESSION; BICEPS TENODESIS;  Surgeon: Wilfredo Cyr DO;  [facility];  Service: Orthopedics    TONSILLECTOMY       Allergies   Allergen Reactions    Eggs Or Egg-Derived Products Anaphylaxis    Iodine Anaphylaxis    Advil [Ibuprofen] Swelling     Lips and eyes swelling    Naproxen Swelling     Lips and eyes     Current Outpatient Prescriptions on File Prior to Visit   Medication Sig Dispense Refill    ALPRAZolam (XANAX) 0 25 mg tablet Take 0 25 mg by mouth 3 (three) times a day as needed for anxiety Usually takes 1/2 tab       butalbital-acetaminophen-caffeine (FIORICET,ESGIC) -40 mg per tablet Take 1 tablet by mouth every 4 (four) hours as needed for headaches      lisinopril (ZESTRIL) 10 mg tablet Take 20 mg by mouth 2 (two) times a day        metoprolol tartrate (LOPRESSOR) 25 mg tablet Take 12 5 mg by mouth 2 (two) times a day        pravastatin (PRAVACHOL) 20 mg tablet Take 20 mg by mouth daily at bedtime        prochlorperazine (COMPAZINE) 10 mg tablet Take 10 mg by mouth every 8 (eight) hours as needed for nausea or vomiting      prochlorperazine (COMPAZINE) 10 mg tablet As needed for migraines and nausea  Limit 3/week 10/month 10 tablet 0     No current facility-administered medications on file prior to visit  Objective:        Ortho Exam      Patient examination shows limited range of motion she is able to get to just over 90° maybe 100° of active range of motion    Passively I am able to get her little higher than that maybe 10° more   When I have her resist empty can she has pain with this she does have strength about 3 to 3+ out of 5 but not full strength  Not compared to the other side will which is 5/5  Portions of the record may have been created with voice recognition software   Occasional wrong word or "sound a like" substitutions may have occurred due to the inherent limitations of voice recognition software   Read the chart carefully and recognize, using context, where substitutions have occurred

## 2018-02-23 ENCOUNTER — TELEPHONE (OUTPATIENT)
Dept: OBGYN CLINIC | Facility: HOSPITAL | Age: 63
End: 2018-02-23

## 2018-02-23 NOTE — TELEPHONE ENCOUNTER
Caller: Patient  Ph: [phone]  Caller reports she has an appointment for an FCE on 3/2 at 10am  Just an FYI--she says you wanted her to let you know

## 2018-03-08 DIAGNOSIS — M75.102 TEAR OF LEFT ROTATOR CUFF, UNSPECIFIED TEAR EXTENT: Primary | ICD-10-CM

## 2018-03-12 ENCOUNTER — TELEPHONE (OUTPATIENT)
Dept: OBGYN CLINIC | Facility: HOSPITAL | Age: 63
End: 2018-03-12

## 2018-03-12 NOTE — TELEPHONE ENCOUNTER
Did the patient have the FCE yet? The FCE will provide her with permanent restrictions once it is completed

## 2018-03-12 NOTE — TELEPHONE ENCOUNTER
Pt is requesting that a work note be sent to her employer/workers comp stating that she is still out and unable to work  She is also requesting that a copy be mailed to her   She can be reached at [phone]
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
## 2018-03-13 ENCOUNTER — TRANSCRIBE ORDERS (OUTPATIENT)
Dept: ADMINISTRATIVE | Facility: HOSPITAL | Age: 63
End: 2018-03-13

## 2018-03-13 DIAGNOSIS — M75.102 ROTATOR CUFF SYNDROME OF LEFT SHOULDER: Primary | ICD-10-CM

## 2018-03-14 ENCOUNTER — TELEPHONE (OUTPATIENT)
Dept: OBGYN CLINIC | Facility: HOSPITAL | Age: 63
End: 2018-03-14

## 2018-03-14 DIAGNOSIS — M75.20 BICIPITAL TENDINITIS OF SHOULDER, UNSPECIFIED LATERALITY: Primary | ICD-10-CM

## 2018-03-14 RX ORDER — DIAZEPAM 5 MG/1
TABLET ORAL
Qty: 2 TABLET | Refills: 0 | OUTPATIENT
Start: 2018-03-14 | End: 2020-01-14

## 2018-03-14 NOTE — TELEPHONE ENCOUNTER
Please note that I did find out the ingredients for the dye:  1-3 ml omnipaque    3 ml multi-maame gadolinium  50 ml Saline    *this is not the same ingredients as the CT scans, the CT scan dye is what most people have an issue with

## 2018-03-14 NOTE — TELEPHONE ENCOUNTER
Patient is calling   [phone]  Patient sees Dr Carol Mcgovern    Patient is calling because she is very concerned about her MRI with Arthrogram  They are:  *She is allergic to many things & wants to know what the dye is made up of  She is allergic to Shellfish, iodine & eggs  *she is also very claustrophobic & is concerned about having an mri in an enclosed mri  She would need to have a med to get her through the procedure  She made the appointment & is very unhappy with having to go to the [facility] for this  She doesn't know she can't go to [facility] or [place]  Do you know if either of these campuses do this procedure? Please contact the patient with any answers

## 2018-03-14 NOTE — TELEPHONE ENCOUNTER
Valium ordered as phone in order prior to MRI  She should not have an adverse reaction to this based on the ingredients  I am pretty sure they offer these MRIs at either location   This is a great question for the  that scheduled the test

## 2018-03-15 NOTE — TELEPHONE ENCOUNTER
Is there any way you could talk to her  I spoke with her yesterday & she did not want to hear any information about her concerns of a possible allergic reaction from anyone other than her "doctor or nurse   someone with a degree"  She stated that she had a very unpleasant conversation with the  & was very upset by her "snarky, indecisive comments"

## 2018-03-15 NOTE — TELEPHONE ENCOUNTER
Spoke with the patient and cleared things up  Can you check to see if valium was called in to pharmacy?

## 2018-03-20 LAB
THYROGLOBULIN AB (HISTORICAL): 1.8 IU/ML (ref 0–0.9)
THYROID PEROXIDASE(TPO) AB (HISTORICAL): 93 IU/ML (ref 0–34)

## 2018-03-26 ENCOUNTER — HOSPITAL ENCOUNTER (OUTPATIENT)
Dept: RADIOLOGY | Facility: HOSPITAL | Age: 63
Discharge: HOME | End: 2018-03-26
Attending: ORTHOPAEDIC SURGERY
Payer: OTHER MISCELLANEOUS

## 2018-03-26 DIAGNOSIS — M75.102 ROTATOR CUFF SYNDROME OF LEFT SHOULDER: ICD-10-CM

## 2018-03-26 DIAGNOSIS — M75.102 TEAR OF LEFT ROTATOR CUFF, UNSPECIFIED TEAR EXTENT: ICD-10-CM

## 2018-03-26 PROCEDURE — A9585 GADOBUTROL INJECTION: HCPCS | Performed by: ORTHOPAEDIC SURGERY

## 2018-03-26 PROCEDURE — 73222 MRI JOINT UPR EXTREM W/DYE: CPT

## 2018-03-26 PROCEDURE — 77002 NEEDLE LOCALIZATION BY XRAY: CPT

## 2018-03-26 PROCEDURE — 23350 INJECTION FOR SHOULDER X-RAY: CPT

## 2018-03-26 RX ORDER — LIDOCAINE HYDROCHLORIDE 10 MG/ML
5 INJECTION, SOLUTION INFILTRATION; PERINEURAL
Status: DISCONTINUED | OUTPATIENT
Start: 2018-03-26 | End: 2018-03-27 | Stop reason: HOSPADM

## 2018-03-26 RX ADMIN — GADOBUTROL 2 ML: 604.72 INJECTION INTRAVENOUS at 17:03

## 2018-03-26 RX ADMIN — IOHEXOL 3 ML: 300 INJECTION, SOLUTION INTRAVENOUS at 17:03

## 2018-03-27 ENCOUNTER — TELEPHONE (OUTPATIENT)
Dept: OBGYN CLINIC | Facility: HOSPITAL | Age: 63
End: 2018-03-27

## 2018-03-27 DIAGNOSIS — G43.709 CHRONIC MIGRAINE WITHOUT AURA WITHOUT STATUS MIGRAINOSUS, NOT INTRACTABLE: Primary | ICD-10-CM

## 2018-03-27 NOTE — TELEPHONE ENCOUNTER
Called Marquise charleen, he instructed that I call the office number listed below which is [phone] and leave a message with the request to have these results faxed  I did this letting them know her appt is on Thursday and we will need the results by then  Gave them [phone] to have the results faxed  Im not sure if you want to keep calling until we receive them  It looks like her appt to see Dr Laura Esparza is in [facility] [address] on Thursday

## 2018-03-27 NOTE — TELEPHONE ENCOUNTER
Patient called in to ask if she could have a refill on her Fiorcet (last given in jan at her last apt per pt)  Patient states she had surgery on her shoulder which failed- she states that she had an MRI yesterday  Prior to the MRI they warned her that the Dye could trigger her migraines  She states the minute they injected the Dye into her she felt a migraine come on- it went from across her eyes and around her head  Pt states that she still has this headache type  She states she has been taking excedrine but it has not been helping at all  Please advise

## 2018-03-27 NOTE — TELEPHONE ENCOUNTER
Patient is calling   [phone]  Patient sees Dr Mildred Levy    Patient is calling to schedule an appointment for her mri results & FCE Results  Patient is scheduled for Thursday 3/29/18  FCE results are coming from [address] [place] [address] [phone]  There person that did the test was Giuliano Bueno & his cell phone # is [phone]  Patient is concerned that we will not have the FCE results back in time for appointment  The facility that did the test will fax to [phone]

## 2018-03-28 RX ORDER — BUTALBITAL, ACETAMINOPHEN AND CAFFEINE 50; 325; 40 MG/1; MG/1; MG/1
1 TABLET ORAL EVERY 4 HOURS PRN
Qty: 10 TABLET | Refills: 0 | Status: SHIPPED | OUTPATIENT
Start: 2018-03-28 | End: 2018-04-20 | Stop reason: ALTCHOICE

## 2018-03-28 NOTE — TELEPHONE ENCOUNTER
Called sarina again in regards to test results and his  stated she faxed results I received them and will have them for patient visit

## 2018-03-28 NOTE — TELEPHONE ENCOUNTER
Left message with patient's family member to have the patient give our office a call back  When pt calls back please let her know that [facility] refilled her prescription as requested  Please make her aware that the script was called into the patient's pharmacy

## 2018-03-29 ENCOUNTER — OFFICE VISIT (OUTPATIENT)
Dept: OBGYN CLINIC | Facility: MEDICAL CENTER | Age: 63
End: 2018-03-29
Payer: OTHER MISCELLANEOUS

## 2018-03-29 VITALS
HEIGHT: 60 IN | DIASTOLIC BLOOD PRESSURE: 109 MMHG | HEART RATE: 66 BPM | WEIGHT: 128 LBS | BODY MASS INDEX: 25.13 KG/M2 | SYSTOLIC BLOOD PRESSURE: 189 MMHG

## 2018-03-29 DIAGNOSIS — M75.122 COMPLETE TEAR OF LEFT ROTATOR CUFF: Primary | ICD-10-CM

## 2018-03-29 PROBLEM — G89.29 CHRONIC LEFT SHOULDER PAIN: Status: ACTIVE | Noted: 2018-03-29

## 2018-03-29 PROBLEM — M25.512 CHRONIC LEFT SHOULDER PAIN: Status: ACTIVE | Noted: 2018-03-29

## 2018-03-29 PROCEDURE — 99212 OFFICE O/P EST SF 10 MIN: CPT | Performed by: ORTHOPAEDIC SURGERY

## 2018-03-29 NOTE — LETTER
March 29, 2018     Patient: Pierce Precise   YOB: 1955   Date of Visit: 3/29/2018       To Whom it May Concern:    Soha Plasencia is under my professional care  She was seen in my office on 3/29/2018  She is unable to perform her previous duties  Due to the current findings which include the MRI and the FCE the likelihood of her being able to perform a task of the using both upper extremities is low  I recommendation is a job that would require only 1 arm  If you have any questions or concerns, please don't hesitate to call           Sincerely,          Mikhail Munoz DO        CC: Stan Precise

## 2018-03-29 NOTE — PROGRESS NOTES
Assessment:  1  Complete tear of left rotator cuff  EMG 1 Limb     Patient Active Problem List   Diagnosis    Rupture of left biceps tendon    Chronic left shoulder pain           Plan  MRI is negative for retear there is actually a successful reapproximation of the rotator cuff  There is a subacromial decompression seen on the MRI as well  FCE shows that she really can't use the left shoulder especially with repetitive activities and recommendation is not to use the left shoulder for work  Based on these 2 findings my recommendation would be no use of the left shoulder for repetitive activities no use of the left shoulder for lifting anything more than 5 lb  Based on these findings my recommendation is a job that only needs 1 arm  I will order an EMG to see if there is any neuropathic component to this including cervical radiculopathy  Subjective:     Patient ID:    Chief Complaint:Katerina Vivar 58 y o  female      HPI     patient comes in today with regards to her left shoulder she had a rotator cuff tear which was March 8, 2017  She had the repair August 29, 2017  She has been doing physical therapy has not had the progress that neither  She or I would expect  We have ordered a repeat MRI to see if she Re tore that rotator cuff repair  We also ordered an FCE to see what her functional level was  She comes back today to review the FCE and to review the MRI  She reports that she still has pain and weakness in the left upper extremity  The following portions of the patient's history were reviewed and updated as appropriate: allergies, current medications, past family history, past social history, past surgical history and problem list         Social History     Social History    Marital status: /Civil Union     Spouse name: N/A    Number of children: N/A    Years of education: N/A     Occupational History    Not on file       Social History Main Topics    Smoking status: Former Smoker     Quit date:     Smokeless tobacco: Never Used    Alcohol use No    Drug use: No    Sexual activity: Not on file     Other Topics Concern    Not on file     Social History Narrative    No narrative on file     Past Medical History:   Diagnosis Date    Anxiety     Arthritis     Depression     Hyperlipidemia     Hypertension     Migraine      Past Surgical History:   Procedure Laterality Date     SECTION      COLONOSCOPY      [place] ESOPHAGOGASTRODUODENOSCOPY TRANSORAL DIAGNOSTIC N/A 2017    Procedure: EGD AND COLONOSCOPY;  Surgeon: Mckenzie Romero MD;  [facility]; Service: Gastroenterology    [place] SHLDR ARTHROSCOP,SURG,W/ROTAT CUFF REPR Left 2017    Procedure: SHOULDER ARTHROSCOPY ROTATOR CUFF REPAIR; SUBACROMIAL DECOMPRESSION; BICEPS TENODESIS;  Surgeon: Jarrod Raya DO;  [facility];  Service: Orthopedics    TONSILLECTOMY       Allergies   Allergen Reactions    Eggs Or Egg-Derived Products Anaphylaxis    Iodine Anaphylaxis    Advil [Ibuprofen] Swelling     Lips and eyes swelling    Naproxen Swelling     Lips and eyes     Current Outpatient Prescriptions on File Prior to Visit   Medication Sig Dispense Refill    ALPRAZolam (XANAX) 0 25 mg tablet Take 0 25 mg by mouth 3 (three) times a day as needed for anxiety Usually takes 1/2 tab       butalbital-acetaminophen-caffeine (FIORICET,ESGIC) -40 mg per tablet Take 1 tablet by mouth every 4 (four) hours as needed for headaches 10 tablet 0    diazepam (VALIUM) 5 mg tablet One tab 30 mins prior to procedure   May repeat immediately prior if necessary 2 tablet 0    lisinopril (ZESTRIL) 10 mg tablet Take 20 mg by mouth 2 (two) times a day        metoprolol tartrate (LOPRESSOR) 25 mg tablet Take 12 5 mg by mouth 2 (two) times a day        pravastatin (PRAVACHOL) 20 mg tablet Take 20 mg by mouth daily at bedtime        prochlorperazine (COMPAZINE) 10 mg tablet Take 10 mg by mouth every 8 (eight) hours as needed for nausea or vomiting      prochlorperazine (COMPAZINE) 10 mg tablet As needed for migraines and nausea  Limit 3/week 10/month 10 tablet 0     No current facility-administered medications on file prior to visit  Objective:        Ortho Exam    Exam shows that she is able to actively flex and abduct to 90° passively I am able to get her to about 120°  She is able to hold the arm in empty can position but when I apply resistance she has weakness and unable to resist   At the side position external internal rotation resistance shows about 4 to 4+ out of 5  I have personally reviewed pertinent films in PACS and my interpretation is No retear of the rotator cuff there is a subacromial decompression  No tendinopathy at this point  Portions of the record may have been created with voice recognition software   Occasional wrong word or "sound a like" substitutions may have occurred due to the inherent limitations of voice recognition software   Read the chart carefully and recognize, using context, where substitutions have occurred

## 2018-04-05 ENCOUNTER — TELEPHONE (OUTPATIENT)
Dept: OBGYN CLINIC | Facility: HOSPITAL | Age: 63
End: 2018-04-05

## 2018-04-05 NOTE — TELEPHONE ENCOUNTER
Pt's emg was rescheduled for 04/12/18 at [facility]   Please cancel her appt on 05/07/18, thank you

## 2018-04-18 LAB
ALBUMIN SERPL BCP-MCNC: 4.4 G/DL (ref 3.5–5.7)
ALP SERPL-CCNC: 84 IU/L (ref 55–165)
ALT SERPL W P-5'-P-CCNC: 25 IU/L (ref 10–30)
ANION GAP SERPL CALCULATED.3IONS-SCNC: 9.6 MM/L
AST SERPL W P-5'-P-CCNC: 33 U/L (ref 7–26)
BILIRUB SERPL-MCNC: 0.4 MG/DL (ref 0.3–1)
BUN SERPL-MCNC: 7 MG/DL (ref 7–25)
CALCIUM SERPL-MCNC: 9.3 MG/DL (ref 8.6–10.5)
CHLORIDE SERPL-SCNC: 82 MM/L (ref 98–107)
CO2 SERPL-SCNC: 32 MM/L (ref 21–31)
CREAT SERPL-MCNC: 0.73 MG/DL (ref 0.6–1.2)
EGFR (HISTORICAL): > 60 GFR
EGFR AFRICAN AMERICAN (HISTORICAL): > 60 GFR
GLUCOSE (HISTORICAL): 68 MG/DL (ref 65–99)
MAGNESIUM SERPL-MCNC: 2 MG/DL (ref 1.9–2.7)
OSMOLALITY, SERUM (HISTORICAL): 238 MOSM (ref 262–291)
POTASSIUM SERPL-SCNC: 3.6 MM/L (ref 3.5–5.5)
SODIUM SERPL-SCNC: 120 MM/L (ref 134–143)
TOTAL PROTEIN (HISTORICAL): 7 G/DL (ref 6.4–8.9)

## 2018-04-19 LAB — C-PEPTIDE (HISTORICAL): 2.3 NG/ML (ref 1.1–4.4)

## 2018-04-20 ENCOUNTER — PROCEDURE VISIT (OUTPATIENT)
Dept: NEUROLOGY | Facility: CLINIC | Age: 63
End: 2018-04-20
Payer: COMMERCIAL

## 2018-04-20 VITALS — TEMPERATURE: 97.4 F

## 2018-04-20 DIAGNOSIS — G43.709 CHRONIC MIGRAINE WITHOUT AURA WITHOUT STATUS MIGRAINOSUS, NOT INTRACTABLE: Primary | ICD-10-CM

## 2018-04-20 PROCEDURE — 64615 CHEMODENERV MUSC MIGRAINE: CPT | Performed by: PHYSICIAN ASSISTANT

## 2018-04-20 RX ORDER — LIDOCAINE AND PRILOCAINE 25; 25 MG/G; MG/G
CREAM TOPICAL AS NEEDED
Qty: 30 G | Refills: 0 | Status: SHIPPED | OUTPATIENT
Start: 2018-04-20 | End: 2020-01-14

## 2018-04-20 NOTE — PROGRESS NOTES
Chemodenervation  Date/Time: 4/20/2018 9:39 AM  Performed by: Grisel Moses  Authorized by: Grisel Moses     Pre-procedure details:     Prepped With: Alcohol    Procedure details:     Position:  Upright  Botox:     Botox Type:  Type A    Brand:  Botox    mL's of Botulinum Toxin:  155    Final Concentration per CC:  50 units    Needle Gauge:  30 G 2 5 inch  Procedures:     Botox Procedures: chronic headache      Indications: migraines    Injection Location:     Head / Face:  L , R , L frontalis, R frontalis, R inferior cervical paraspinal, L inferior cervical paraspinal, L medial occipitalis, R medial occipitalis, L lateral occipitalis, R lateral occipitalis, procerus, L temporalis, R temporalis, R superior trapezius and L superior trapezius    L  injection amount:  5 unit(s)    R  injection amount:  5 unit(s)    L lateral frontalis:  5 unit(s)    R lateral frontalis:  5 unit(s)    L medial frontalis:  5 unit(s)    R medial frontalis:  5 unit(s)    L temporalis injection amount:  20 unit(s)    R temporalis injection amount:  20 unit(s)    Procerus injection amount:  5 unit(s)    L lateral occipitalis injection amount:  5 unit(s)    R lateral occipitalis injection amount:  5 unit(s)    L medial occipitalis injection amount:  10 unit(s)    R medial occipitalis injection amount:  10 unit(s)    L inferior cervical paraspinal injection amount:  10 unit(s)    R inferior cervical paraspinal injection amount:  10 unit(s)    L superior trapezius injection amount:  15 unit(s)    R superior trapezius injection amount:  15 unit(s)  Total Units:     Total units used:  155    Total units discarded:  45  Post-procedure details:     Chemodenervation:  Chronic migraine    Facial Nerve Location[de-identified]  Bilateral facial nerve    Patient tolerance of procedure:   Tolerated well, no immediate complications

## 2018-04-25 ENCOUNTER — TRANSCRIBE ORDERS (OUTPATIENT)
Dept: NEUROLOGY | Facility: HOSPITAL | Age: 63
End: 2018-04-25

## 2018-04-25 LAB
ANION GAP SERPL CALCULATED.3IONS-SCNC: 11.3 MM/L
BUN SERPL-MCNC: 14 MG/DL (ref 7–25)
CALCIUM SERPL-MCNC: 9.5 MG/DL (ref 8.6–10.5)
CHLORIDE SERPL-SCNC: 97 MM/L (ref 98–107)
CO2 SERPL-SCNC: 29 MM/L (ref 21–31)
CREAT SERPL-MCNC: 0.69 MG/DL (ref 0.6–1.2)
CREATININE, RANDOM URINE (HISTORICAL): 29.3 MG/DL
EGFR (HISTORICAL): > 60 GFR
EGFR AFRICAN AMERICAN (HISTORICAL): > 60 GFR
GLUCOSE (HISTORICAL): 79 MG/DL (ref 65–99)
OSMOLALITY, SERUM (HISTORICAL): 266 MOSM (ref 262–291)
POTASSIUM SERPL-SCNC: 4.3 MM/L (ref 3.5–5.5)
SODIUM SERPL-SCNC: 133 MM/L (ref 134–143)
SODIUM URINE (HISTORICAL): 23 MM/L

## 2018-04-26 ENCOUNTER — OFFICE VISIT (OUTPATIENT)
Dept: OBGYN CLINIC | Facility: CLINIC | Age: 63
End: 2018-04-26
Payer: OTHER MISCELLANEOUS

## 2018-04-26 VITALS
DIASTOLIC BLOOD PRESSURE: 84 MMHG | WEIGHT: 128 LBS | HEIGHT: 60 IN | SYSTOLIC BLOOD PRESSURE: 199 MMHG | HEART RATE: 68 BPM | BODY MASS INDEX: 25.13 KG/M2

## 2018-04-26 DIAGNOSIS — S46.212D RUPTURE OF LEFT BICEPS TENDON, SUBSEQUENT ENCOUNTER: Primary | ICD-10-CM

## 2018-04-26 DIAGNOSIS — M25.512 CHRONIC LEFT SHOULDER PAIN: ICD-10-CM

## 2018-04-26 DIAGNOSIS — G89.29 CHRONIC LEFT SHOULDER PAIN: ICD-10-CM

## 2018-04-26 DIAGNOSIS — M75.122 COMPLETE TEAR OF LEFT ROTATOR CUFF: ICD-10-CM

## 2018-04-26 LAB
COMMENT (HISTORICAL): NORMAL
OSMOLALITY UR: 230 MOSMO

## 2018-04-26 PROCEDURE — 99212 OFFICE O/P EST SF 10 MIN: CPT | Performed by: ORTHOPAEDIC SURGERY

## 2018-04-26 RX ORDER — FUROSEMIDE 20 MG/1
20 TABLET ORAL EVERY MORNING
Refills: 3 | COMMUNITY
Start: 2018-03-06 | End: 2020-01-14

## 2018-04-26 RX ORDER — ONABOTULINUMTOXINA 100 [USP'U]/1
INJECTION, POWDER, LYOPHILIZED, FOR SOLUTION INTRADERMAL; INTRAMUSCULAR
COMMUNITY
Start: 2018-03-12 | End: 2018-12-12 | Stop reason: SDUPTHER

## 2018-04-26 RX ORDER — LEVOTHYROXINE SODIUM 0.03 MG/1
TABLET ORAL
COMMUNITY
Start: 2018-03-08 | End: 2020-01-14

## 2018-04-26 RX ORDER — HYDROCHLOROTHIAZIDE 25 MG/1
25 TABLET ORAL EVERY MORNING
Refills: 3 | COMMUNITY
Start: 2018-03-30 | End: 2020-01-14

## 2018-04-26 RX ORDER — BUTALBITAL, ACETAMINOPHEN AND CAFFEINE 300; 40; 50 MG/1; MG/1; MG/1
1 CAPSULE ORAL EVERY 6 HOURS PRN
Refills: 5 | COMMUNITY
Start: 2018-04-09 | End: 2018-06-22 | Stop reason: SDUPTHER

## 2018-04-26 NOTE — PROGRESS NOTES
Assessment:  1  Rupture of left biceps tendon, subsequent encounter     2  Complete tear of left rotator cuff     3  Chronic left shoulder pain       Patient Active Problem List   Diagnosis    Rupture of left biceps tendon    Chronic left shoulder pain    Chronic migraine without aura without status migrainosus, not intractable           Plan      At this point I feel that all the tests that we have shown and perform show that the rotator cuff is repaired the nerve is fine the issue is that this may and is most likely the maximum that she is going to get out of the shoulder is probably some atrophy of the muscle that will never recover  The best reference for what she is able and can do is the functional capacity exam that was performed  And based on that information and the recommendation from that information 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  Subjective:     Patient ID:    Chief Complaint:Katerina Vivar 58 y o  female      HPI     patient comes in with regards to her left shoulder she had a rotator cuff tear that she had repaired it has been over 8 months since the repair  She has had issues with the where she does not have the strength that 1 would expect or 1 would have after surgery we had an EMG to review rule out any nerve involvement she comes back to review that  Her MRI that we repeated to make sure that there was no re-tear showed that there was no retear  She still has pain in the area as well  The following portions of the patient's history were reviewed and updated as appropriate: allergies, current medications, past family history, past social history, past surgical history and problem list         Social History     Social History    Marital status: /Civil Union     Spouse name: N/A    Number of children: N/A    Years of education: N/A     Occupational History    Not on file       Social History Main Topics    Smoking status: Former Smoker     Quit date: 200    Smokeless tobacco: Never Used    Alcohol use No    Drug use: No    Sexual activity: Not on file     Other Topics Concern    Not on file     Social History Narrative    No narrative on file     Past Medical History:   Diagnosis Date    Anxiety     Arthritis     Depression     Hyperlipidemia     Hypertension     Migraine      Past Surgical History:   Procedure Laterality Date     SECTION      COLONOSCOPY      [place] ESOPHAGOGASTRODUODENOSCOPY TRANSORAL DIAGNOSTIC N/A 2017    Procedure: EGD AND COLONOSCOPY;  Surgeon: Ann Marie Vanegas MD;  [facility]; Service: Gastroenterology    [place] [address] ARTHROSCOP,SURG,W/ROTAT CUFF REPR Left 2017    Procedure: SHOULDER ARTHROSCOPY ROTATOR CUFF REPAIR; SUBACROMIAL DECOMPRESSION; BICEPS TENODESIS;  Surgeon: Sofia Santos DO;  [facility];  Service: Orthopedics    TONSILLECTOMY       Allergies   Allergen Reactions    Eggs Or Egg-Derived Products Anaphylaxis    Iodine Anaphylaxis    Advil [Ibuprofen] Swelling     Lips and eyes swelling    Naproxen Swelling     Lips and eyes     Current Outpatient Prescriptions on File Prior to Visit   Medication Sig Dispense Refill    ALPRAZolam (XANAX) 0 25 mg tablet Take 0 25 mg by mouth 3 (three) times a day as needed for anxiety Usually takes 1/2 tab       diazepam (VALIUM) 5 mg tablet One tab 30 mins prior to procedure   May repeat immediately prior if necessary 2 tablet 0    isometheptene-dichloral-apap (MIDRIN) -325 MG per capsule Take 1 capsule by mouth every 6 (six) hours as needed for migraine 15 capsule 0    lidocaine-prilocaine (EMLA) cream Apply topically as needed for mild pain 30 g 0    lisinopril (ZESTRIL) 10 mg tablet Take 20 mg by mouth 2 (two) times a day        metoprolol tartrate (LOPRESSOR) 25 mg tablet Take 12 5 mg by mouth 2 (two) times a day        pravastatin (PRAVACHOL) 20 mg tablet Take 20 mg by mouth daily at bedtime        prochlorperazine (COMPAZINE) 10 mg tablet Take 10 mg by mouth every 8 (eight) hours as needed for nausea or vomiting      prochlorperazine (COMPAZINE) 10 mg tablet As needed for migraines and nausea  Limit 3/week 10/month 10 tablet 0     No current facility-administered medications on file prior to visit  Objective:        Ortho Exam      Flexion is to 90 degrees on the arm that is active range of motion passively I am able to get her to about 130 degrees  I have personally reviewed pertinent films in PACS  Portions of the record may have been created with voice recognition software   Occasional wrong word or "sound a like" substitutions may have occurred due to the inherent limitations of voice recognition software   Read the chart carefully and recognize, using context, where substitutions have occurred

## 2018-06-22 ENCOUNTER — OFFICE VISIT (OUTPATIENT)
Dept: NEUROLOGY | Facility: CLINIC | Age: 63
End: 2018-06-22
Payer: COMMERCIAL

## 2018-06-22 VITALS
DIASTOLIC BLOOD PRESSURE: 80 MMHG | BODY MASS INDEX: 23.56 KG/M2 | SYSTOLIC BLOOD PRESSURE: 120 MMHG | HEIGHT: 60 IN | WEIGHT: 120 LBS | HEART RATE: 62 BPM

## 2018-06-22 DIAGNOSIS — G43.109 MIGRAINE WITH AURA AND WITHOUT STATUS MIGRAINOSUS, NOT INTRACTABLE: ICD-10-CM

## 2018-06-22 DIAGNOSIS — G43.709 CHRONIC MIGRAINE WITHOUT AURA WITHOUT STATUS MIGRAINOSUS, NOT INTRACTABLE: Primary | ICD-10-CM

## 2018-06-22 PROCEDURE — 99213 OFFICE O/P EST LOW 20 MIN: CPT | Performed by: PHYSICIAN ASSISTANT

## 2018-06-22 RX ORDER — BUTALBITAL, ACETAMINOPHEN AND CAFFEINE 300; 40; 50 MG/1; MG/1; MG/1
CAPSULE ORAL
Qty: 15 CAPSULE | Refills: 0 | Status: SHIPPED | OUTPATIENT
Start: 2018-06-22 | End: 2018-07-23 | Stop reason: SINTOL
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
## 2018-07-09 ENCOUNTER — TELEPHONE (OUTPATIENT)
Dept: NEUROLOGY | Facility: CLINIC | Age: 63
End: 2018-07-09

## 2018-07-09 NOTE — TELEPHONE ENCOUNTER
Alberto Eden from [facility] providing Botox approval: [id number] 4 visits for Botox from 7/9/2018-7/9/2019

## 2018-07-09 NOTE — TELEPHONE ENCOUNTER
Claudette Cavazos from [employer] calling regarding botox fax    Called transferred to [place], [place]

## 2018-07-23 DIAGNOSIS — G43.709 CHRONIC MIGRAINE WITHOUT AURA WITHOUT STATUS MIGRAINOSUS, NOT INTRACTABLE: Primary | ICD-10-CM

## 2018-07-23 RX ORDER — BUTALBITAL, ACETAMINOPHEN AND CAFFEINE 50; 325; 40 MG/1; MG/1; MG/1
TABLET ORAL
Qty: 15 TABLET | Refills: 0 | Status: SHIPPED | OUTPATIENT
Start: 2018-07-23 | End: 2018-10-26 | Stop reason: SDUPTHER

## 2018-07-23 NOTE — TELEPHONE ENCOUNTER
Following up with [facility] regarding shipment of botox, [facility] states they never received the approval of botox including permission to ship from botox  She rechecked the system once I gave her the approval code again and medication was "ready to ship" she contacted the pt and got approval to ship  Botox to be delivered tomorrow 7/24/18, informed pt

## 2018-07-23 NOTE — TELEPHONE ENCOUNTER
I spoke to the pt regarding her botox and she mentioned she was not able to take her Fiorciet because she can not swallow capsule like pills and needs tablets  I changed the script in her chart and confirmed the pharmacy on file

## 2018-07-24 ENCOUNTER — PROCEDURE VISIT (OUTPATIENT)
Dept: NEUROLOGY | Facility: CLINIC | Age: 63
End: 2018-07-24
Payer: COMMERCIAL

## 2018-07-24 VITALS
BODY MASS INDEX: 24.46 KG/M2 | HEIGHT: 60 IN | TEMPERATURE: 98.1 F | WEIGHT: 124.6 LBS | SYSTOLIC BLOOD PRESSURE: 134 MMHG | HEART RATE: 62 BPM | DIASTOLIC BLOOD PRESSURE: 78 MMHG

## 2018-07-24 DIAGNOSIS — G43.709 CHRONIC MIGRAINE WITHOUT AURA WITHOUT STATUS MIGRAINOSUS, NOT INTRACTABLE: Primary | ICD-10-CM

## 2018-07-24 PROCEDURE — 64615 CHEMODENERV MUSC MIGRAINE: CPT | Performed by: PHYSICIAN ASSISTANT

## 2018-07-24 RX ORDER — ONDANSETRON 4 MG/1
4 TABLET, ORALLY DISINTEGRATING ORAL EVERY 6 HOURS PRN
Qty: 20 TABLET | Refills: 2 | Status: SHIPPED | OUTPATIENT
Start: 2018-07-24 | End: 2019-01-28 | Stop reason: SDUPTHER

## 2018-07-24 NOTE — PROGRESS NOTES
Chemodenervation  Date/Time: 7/24/2018 2:46 PM  Performed by: Chidi Soler  Authorized by: Cruzito Reynolds details:     Prepped With: Alcohol      Prepped With comment:  The patient was prepped with alcohol, and then lidocaine/prilocaine as noted in her chart for pain relief  Procedure details:     Position:  Upright  Botox:     Botox Type:  Type A    Brand:  Botox    mL's of Botulinum Toxin:  140    Final Concentration per CC:  50 units    Needle Gauge:  30 G 2 5 inch  Procedures:     Botox Procedures: chronic headache      Indications: migraines    Injection Location:     Head / Face:  L , R , L frontalis, R frontalis, L medial occipitalis, R medial occipitalis, L lateral occipitalis, R lateral occipitalis, L temporalis and R temporalis    L  injection amount:  5 unit(s)    R  injection amount:  5 unit(s)    L lateral frontalis:  5 unit(s)    R lateral frontalis:  5 unit(s)    L medial frontalis:  5 unit(s)    R medial frontalis:  5 unit(s)    L temporalis injection amount:  20 unit(s)    R temporalis injection amount:  20 unit(s)    L lateral occipitalis injection amount:  5 unit(s)    R lateral occipitalis injection amount:  5 unit(s)    L medial occipitalis injection amount:  10 unit(s)    R medial occipitalis injection amount:  10 unit(s)    R inferior cervical paraspinal injection amount:  10 unit(s)  Total Units:     Total units used:  140    Total units discarded:  60  Post-procedure details:     Chemodenervation:  Chronic migraine    Facial Nerve Location[de-identified]  Bilateral facial nerve    Patient tolerance of procedure: Tolerated well, no immediate complications  Comments:      5 units in each orbicularis oculi muscle in 2 divided doses on each side, 5 units in each masseter muscle, 10 units in the apex/ scalp, all medically necessary (total extra 30 units, medically necessary)

## 2018-09-21 ENCOUNTER — TELEPHONE (OUTPATIENT)
Dept: NEUROLOGY | Facility: CLINIC | Age: 63
End: 2018-09-21

## 2018-09-21 NOTE — TELEPHONE ENCOUNTER
Virgilio Must, please let Luis or Rip Bullock know about this  They are taking care of the botox for Farnci while she is away      Thank you

## 2018-09-21 NOTE — TELEPHONE ENCOUNTER
Mary Alice Payton,    Patient called regarding her Botox  She stated that [facility] keeps calling her (about 5 times) to confirm the delivery of her Botox to our office and she has agreed  But then they call telling her that our office hasn't called them regarding the Botox  Patient is confused and wants to make sure her botox gets delivered to our office for her next Botox appointment on 10/26 at 10:30am  She says there is no rush since there is time till her appointment  I wasn't sure who to address the situation to or who was working on patient's Botox delivery  Thanks!

## 2018-09-26 ENCOUNTER — DOCUMENTATION (OUTPATIENT)
Dept: NEUROLOGY | Facility: CLINIC | Age: 63
End: 2018-09-26

## 2018-09-26 NOTE — PROGRESS NOTES
Botox received from [facility] on 9/16/18  Botox was placed in the fridge until patient's upcoming appointment on 10/26/18

## 2018-10-25 ENCOUNTER — OFFICE VISIT (OUTPATIENT)
Dept: OBGYN CLINIC | Facility: MEDICAL CENTER | Age: 63
End: 2018-10-25
Payer: COMMERCIAL

## 2018-10-25 VITALS — WEIGHT: 124 LBS | HEIGHT: 60 IN | BODY MASS INDEX: 24.35 KG/M2

## 2018-10-25 DIAGNOSIS — M77.01 MEDIAL EPICONDYLITIS OF ELBOW, RIGHT: ICD-10-CM

## 2018-10-25 DIAGNOSIS — R29.898 RIGHT HAND WEAKNESS: ICD-10-CM

## 2018-10-25 DIAGNOSIS — M77.8 TENDONITIS OF WRIST, RIGHT: ICD-10-CM

## 2018-10-25 DIAGNOSIS — G56.02 CARPAL TUNNEL SYNDROME ON LEFT: ICD-10-CM

## 2018-10-25 DIAGNOSIS — R20.2 NUMBNESS AND TINGLING IN RIGHT HAND: Primary | ICD-10-CM

## 2018-10-25 DIAGNOSIS — R20.0 NUMBNESS AND TINGLING IN RIGHT HAND: Primary | ICD-10-CM

## 2018-10-25 PROCEDURE — 99214 OFFICE O/P EST MOD 30 MIN: CPT | Performed by: ORTHOPAEDIC SURGERY

## 2018-10-25 NOTE — PROGRESS NOTES
Assessment/Plan:  1  Numbness and tingling in right hand     2  Right hand weakness     3  Medial epicondylitis of elbow, right     4  Tendonitis of wrist, right       Patient Active Problem List   Diagnosis    Rupture of left biceps tendon    Chronic left shoulder pain    Chronic migraine without aura without status migrainosus, not intractable    Migraine with aura and without status migrainosus, not intractable       Discussion/Summary:    61 y o  female  Status post  Left long head biceps tendon repair on 08/29/2017 with bilateral carpal tunnel syndrome, right medial malleolus, and right wrist tendinitis  Offered the patient corticosteroid injections for bilateral carpal tunnels which she declined today  Discussed possibility of a EMG for the right upper extremity but she declined this today as well as she would not like to proceed with open carpal tunnel release even if EMG indicates this might   Be beneficial for her  She received cock-up wrist splint for the right wrist today  Filled out paperwork for her disability as well as she has reached  Maximum medical improvement in the left upper extremity  As she desires no further intervention, she will in longer require   Further scheduled follow up with this office  The patient was seen and examined by Dr Davon Cordero and myself  The assessment and plan were formulated by Dr Davon Cordero and I assisted in carrying it out  Follow Up:  PRN        Subjective:   Patient ID: Shashank Euceda is a 61 y o  female   HPI    Patient presents to the office complaining of  Right wrist and forearm pain as well as right hand weakness and  Numbness tingling  Pain is located  Dorsum of the right wrist over the forearm as well and into the hand  The symptoms have been present for 3 months but have recently worsened  Pain is described as  Carline Blind although sometimes aching  The pain is  Constant but does fluctuate in severity   It is made worse by  Wrist flexion and use of the hand  It is made better by  rest  So far the patient has tried  No treatments  The patient  Has not had past episodes similar to this  Denies any trauma or injury to the forearm wrist or hand  The following portions of the patient's history were reviewed and updated as appropriate: allergies, current medications, past family history, past social history, past surgical history and problem list     Social History     Social History    Marital status: /Civil Union     Spouse name: N/A    Number of children: N/A    Years of education: N/A     Occupational History    Not on file  Social History Main Topics    Smoking status: Former Smoker     Quit date:     Smokeless tobacco: Never Used    Alcohol use No    Drug use: No    Sexual activity: Not on file     Other Topics Concern    Not on file     Social History Narrative    No narrative on file     Past Medical History:   Diagnosis Date    Anxiety     Arthritis     Depression     Hyperlipidemia     Hypertension     Migraine      Past Surgical History:   Procedure Laterality Date     SECTION      COLONOSCOPY      HI ESOPHAGOGASTRODUODENOSCOPY TRANSORAL DIAGNOSTIC N/A 2017    Procedure: EGD AND COLONOSCOPY;  Surgeon: Benjamin Clark MD;  Location: [facility];   Service: Gastroenterology    HI SHLDR ARTHROSCOP,SURG,W/ROTAT CUFF REPR Left 2017    Procedure: SHOULDER ARTHROSCOPY ROTATOR CUFF REPAIR; SUBACROMIAL DECOMPRESSION; BICEPS TENODESIS;  Surgeon: Mikhail Munoz DO;  Location: [facility];  Service: Orthopedics    TONSILLECTOMY       Allergies   Allergen Reactions    Eggs Or Egg-Derived Products Anaphylaxis    Iodine Anaphylaxis    Advil [Ibuprofen] Swelling     Lips and eyes swelling    Naproxen Swelling     Lips and eyes     Current Outpatient Prescriptions on File Prior to Visit   Medication Sig Dispense Refill    ALPRAZolam (XANAX) 0 25 mg tablet Take 0 25 mg by mouth 3 (three) times a day as needed for anxiety Usually takes 1/2 tab       BOTOX 100 units       butalbital-acetaminophen-caffeine (FIORICET,ESGIC) -40 mg per tablet 1 tablet q6 hours prn migraine  No more than 2-3 per week 15 tablet 0    diazepam (VALIUM) 5 mg tablet One tab 30 mins prior to procedure  May repeat immediately prior if necessary 2 tablet 0    furosemide (LASIX) 20 mg tablet 20 mg every morning  3    hydrochlorothiazide (HYDRODIURIL) 25 mg tablet 25 mg every morning  3    levothyroxine 25 mcg tablet       lidocaine-prilocaine (EMLA) cream Apply topically as needed for mild pain 30 g 0    lisinopril (ZESTRIL) 10 mg tablet Take 20 mg by mouth 2 (two) times a day        metoprolol tartrate (LOPRESSOR) 25 mg tablet Take 12 5 mg by mouth 2 (two) times a day        ondansetron (ZOFRAN-ODT) 4 mg disintegrating tablet Take 1 tablet (4 mg total) by mouth every 6 (six) hours as needed for nausea or vomiting 20 tablet 2    pravastatin (PRAVACHOL) 20 mg tablet Take 20 mg by mouth daily at bedtime         No current facility-administered medications on file prior to visit  Review of Systems   Constitutional: Negative for chills, fever and unexpected weight change  HENT: Negative for hearing loss, nosebleeds and sore throat  Eyes: Negative for pain, redness and visual disturbance  Respiratory: Negative for cough, shortness of breath and wheezing  Cardiovascular: Negative for chest pain, palpitations and leg swelling  Gastrointestinal: Negative for abdominal pain, nausea and vomiting  Endocrine: Negative for polydipsia and polyuria  Genitourinary: Negative for dysuria and hematuria  Musculoskeletal:         As noted in HPI   Skin: Negative for rash and wound  Neurological: Negative for dizziness, numbness and headaches  Psychiatric/Behavioral: Negative for decreased concentration, dysphoric mood and suicidal ideas  The patient is not nervous/anxious  Objective:     There were no vitals filed for this visit     Physical Exam   Constitutional: She is oriented to person, place, and time  She appears well-developed and well-nourished  HENT:   Head: Normocephalic and atraumatic  Eyes: Conjunctivae are normal  No scleral icterus  Neck: Neck supple  Pulmonary/Chest: Effort normal  No stridor  No respiratory distress  Abdominal: She exhibits no distension  Neurological: She is alert and oriented to person, place, and time  Skin: Skin is warm and dry  No erythema  Psychiatric: She has a normal mood and affect  Her behavior is normal        Right Hand Exam     Tenderness   The patient is experiencing tenderness in the dorsal area and ulnar area  Range of Motion     Wrist   Extension: normal   Flexion: normal   Pronation: normal   Supination: normal     Tests   Tinels Sign (Medial Nerve): positive    Other   Erythema: absent  Scars: absent  Pulse: present    Comments:  No visible deformity, no ecchymosis  Full ROM at all MCP and IP joints  3/5 strength APB and AIN  Positive compression test over carpal tunnel        Left Hand Exam     Tests   Tinels Sign (Medial Nerve): positive    Comments:  Positive compression over carpal tunnel  3/5 strength AIN and APB      Right Elbow Exam     Tenderness   The patient is experiencing tenderness in the medial epicondyle  Range of Motion   Extension: normal   Flexion: normal   Pronation: normal   Supination: normal     Muscle Strength   The patient has normal right elbow strength  Tests Varus: negative  Valgus: negative  Tinel's Sign (cubital tunnel): negative    Other   Erythema: absent  Scars: absent  Sensation: normal  Pulse: present    Comments:  Negative flexion compression test over cubital tunnel            I have personally reviewed pertinent films in PACS      Procedures  No Procedures performed today    Portions of the record may have been created with voice recognition software   Occasional wrong word or "sound a like" substitutions may have occurred due to the inherent limitations of voice recognition software   Read the chart carefully and recognize, using context, where substitutions have occurred

## 2018-10-26 ENCOUNTER — PROCEDURE VISIT (OUTPATIENT)
Dept: NEUROLOGY | Facility: CLINIC | Age: 63
End: 2018-10-26
Payer: COMMERCIAL

## 2018-10-26 VITALS — DIASTOLIC BLOOD PRESSURE: 86 MMHG | TEMPERATURE: 97.8 F | SYSTOLIC BLOOD PRESSURE: 132 MMHG | HEART RATE: 66 BPM

## 2018-10-26 DIAGNOSIS — G43.709 CHRONIC MIGRAINE WITHOUT AURA WITHOUT STATUS MIGRAINOSUS, NOT INTRACTABLE: Primary | ICD-10-CM

## 2018-10-26 PROCEDURE — 64615 CHEMODENERV MUSC MIGRAINE: CPT | Performed by: PHYSICIAN ASSISTANT

## 2018-10-26 RX ORDER — BUTALBITAL, ACETAMINOPHEN AND CAFFEINE 50; 325; 40 MG/1; MG/1; MG/1
TABLET ORAL
Qty: 15 TABLET | Refills: 2 | Status: SHIPPED | OUTPATIENT
Start: 2018-10-26 | End: 2019-01-28 | Stop reason: SDUPTHER

## 2018-10-26 NOTE — PROGRESS NOTES
Chemodenervation  Date/Time: 10/26/2018 10:34 AM  Performed by: Roger Lozano  Authorized by: Roger Lozano     Pre-procedure details:     Prepped With: Alcohol    Procedure details:     Position:  Upright  Botox:     Botox Type:  Type A    Brand:  Botox    mL's of Botulinum Toxin:  180    Final Concentration per CC:  50 units    Needle Gauge:  30 G 2 5 inch  Procedures:     Botox Procedures: chronic headache      Indications: migraines    Injection Location:     Head / Face:  L , R , L frontalis, R frontalis, R inferior cervical paraspinal, L inferior cervical paraspinal, L medial occipitalis, R medial occipitalis, L lateral occipitalis, R lateral occipitalis, procerus, L temporalis, R temporalis, R superior trapezius, L superior trapezius, L orbicularis oculi and R orbicularis oculi    L  injection amount:  5 unit(s)    R  injection amount:  5 unit(s)    L lateral frontalis:  5 unit(s)    R lateral frontalis:  5 unit(s)    L medial frontalis:  5 unit(s)    R medial frontalis:  5 unit(s)    L temporalis injection amount:  20 unit(s)    R temporalis injection amount:  20 unit(s)    Procerus injection amount:  0 unit(s)    L orbicularis oculi injection amount:  5 unit(s)    R orbicularis oculi injection amount:  5 unit(s)    Comments:  R and L medically necessary    L lateral occipitalis injection amount:  5 unit(s)    R lateral occipitalis injection amount:  5 unit(s)    L medial occipitalis injection amount:  10 unit(s)    R medial occipitalis injection amount:  10 unit(s)    L inferior cervical paraspinal injection amount:  10 unit(s)    R inferior cervical paraspinal injection amount:  10 unit(s)    L superior trapezius injection amount:  15 unit(s)    R superior trapezius injection amount:  15 unit(s)  Total Units:     Total units used:  180    Total units discarded:  20  Post-procedure details:     Chemodenervation:  Chronic migraine    Facial Nerve Location[de-identified] Bilateral facial nerve    Patient tolerance of procedure: Tolerated well, no immediate complications  Comments:      20 units in the scalp, medically necessary                Vitals:    10/26/18 1020   BP: 132/86   Pulse: 66   Temp: 97 8 °F (36 6 °C)

## 2018-11-27 ENCOUNTER — ANNUAL EXAM (OUTPATIENT)
Dept: OBGYN CLINIC | Age: 63
End: 2018-11-27
Payer: COMMERCIAL

## 2018-11-27 VITALS
SYSTOLIC BLOOD PRESSURE: 120 MMHG | HEIGHT: 60 IN | BODY MASS INDEX: 25.13 KG/M2 | WEIGHT: 128 LBS | DIASTOLIC BLOOD PRESSURE: 88 MMHG

## 2018-11-27 DIAGNOSIS — Z12.31 ENCOUNTER FOR SCREENING MAMMOGRAM FOR MALIGNANT NEOPLASM OF BREAST: ICD-10-CM

## 2018-11-27 DIAGNOSIS — R92.2 DENSE BREAST: ICD-10-CM

## 2018-11-27 DIAGNOSIS — Z01.419 ENCOUNTER FOR GYNECOLOGICAL EXAMINATION WITHOUT ABNORMAL FINDING: Primary | ICD-10-CM

## 2018-11-27 PROBLEM — D50.9 IRON DEFICIENCY ANEMIA: Status: ACTIVE | Noted: 2017-04-27

## 2018-11-27 PROBLEM — R74.01 ELEVATED TRANSAMINASE LEVEL: Status: ACTIVE | Noted: 2017-04-27

## 2018-11-27 PROBLEM — G43.709 CHRONIC MIGRAINE WITHOUT AURA WITHOUT STATUS MIGRAINOSUS, NOT INTRACTABLE: Status: RESOLVED | Noted: 2018-04-20 | Resolved: 2018-11-27

## 2018-11-27 PROCEDURE — 99396 PREV VISIT EST AGE 40-64: CPT | Performed by: OBSTETRICS & GYNECOLOGY

## 2018-11-27 NOTE — ASSESSMENT & PLAN NOTE
Pap/HPV current  Mammogram - ordered, need to obtain last result  Colonoscopy current      Encourage healthy diet, exercise, Calcium 1200mg per day and at least 800 iu Vitamin D daily

## 2018-11-27 NOTE — PATIENT INSTRUCTIONS
Mammogram   AMBULATORY CARE:   What you need to know about a mammogram:  A mammogram is an x-ray of your breasts to screen for breast cancer  Experts recommend mammograms every 2 years starting at age 48 years  You may need a mammogram at age 52 years or younger if you have an increased risk for breast cancer  Talk to your healthcare provider about when you should start having mammograms and how often you need them  How to prepare for a mammogram:   · Do not use deodorant, powder, lotion, or perfume  These products may cause particles to appear on your mammogram      · Wear a 2-piece outfit  · If your breasts are tender before your monthly period, do not have a mammogram during this time  Schedule your mammogram to be done 1 week after your period ends  · If you are breastfeeding, express as much milk as possible before the mammogram     · Bring a list of the dates and places of your past mammograms and other breast tests or treatments  What will happen during a mammogram:  A top view and a side view x-ray are usually done for each breast  Tell healthcare providers if you have breast implants or breast problems before you have your mammogram  You may need extra x-rays of each breast   · You will be given a hospital gown  Take off your clothes from the waist up  Wear the hospital gown so that it opens in the front  · You will sit or stand next to a small x-ray machine  The healthcare provider will help you place one of your breasts on the x-ray plate  Your arm and breast will be moved until your breast is in the correct position  · Your breast will be gently pressed between 2 plastic plates for a few seconds while the x-ray is taken  This may be uncomfortable  · You will be asked to hold your breath while the x-ray is taken  Another x-ray will be taken of the same breast after the position of the x-ray machine has been changed  · Your other breast will be x-rayed the same way    What will happen after a mammogram:  Your breasts may feel tender for a short while after the mammogram  You may resume your regular activities  Ask your healthcare provider when you should receive the results of your mammogram   Risks of a mammogram:  You will be exposed to a small amount of radiation  Some breast cancers may not show up on mammograms  Contact your healthcare provider if:   · You cannot make your appointment on time  · You do not receive your results when expected  · You have questions or concerns about the mammogram   Follow up with your healthcare provider as directed:  Write down your questions so you remember to ask them during your visits  © 2017 [employer] [employer]  Information is for End User's use only and may not be sold, redistributed or otherwise used for commercial purposes  All illustrations and images included in CareNotes® are the copyrighted property of [employer]  or [employer]  The above information is an  only  It is not intended as medical advice for individual conditions or treatments  Talk to your doctor, nurse or pharmacist before following any medical regimen to see if it is safe and effective for you

## 2018-11-27 NOTE — PROGRESS NOTES
Assessment/Plan:    Encounter for gynecological examination without abnormal finding  Pap/HPV current  Mammogram - ordered, need to obtain last result  Colonoscopy current      Encourage healthy diet, exercise, Calcium 1200mg per day and at least 800 iu Vitamin D daily  Diagnoses and all orders for this visit:    Encounter for gynecological examination without abnormal finding    Encounter for screening mammogram for malignant neoplasm of breast  -     Mammo screening bilateral w 3d & cad; Future    Dense breast  -     Mammo screening bilateral w 3d & cad; Future          Subjective:      Patient ID: Garcia Hurst is a 61 y o  female  Patient here for yearly exam  No current complaints at this time  Age of first period: 8years old    ( 1 adopted son)  Lmp: patient is   Last pap: 16 negative,hpv- 9due )  Last mammo: per patient had last year at [facility] no records found  Will have patient sign record release when she checks out  Colonoscopy: 17 (due )  Patient is not a smoker  Patient is not a drinker  Recent decrease in exercise - had rotator cuff surgery last year  Was walking daily but last year saw a bear which made her afraid to walk near her home  Patient exercises  Gynecologic Exam   The patient's pertinent negatives include no genital itching, genital lesions, genital odor, genital rash, pelvic pain, vaginal bleeding or vaginal discharge  Pertinent negatives include no chills, constipation, diarrhea, fever, nausea, sore throat, urgency or vomiting  She is not sexually active  She is postmenopausal        The following portions of the patient's history were reviewed and updated as appropriate:   She  has a past medical history of Anxiety; Arthritis; Depression; Hyperlipidemia; Hypertension; and Migraine    She   Patient Active Problem List    Diagnosis Date Noted    Encounter for gynecological examination without abnormal finding 2018    Numbness and tingling in right hand 10/25/2018    Right hand weakness 10/25/2018    Medial epicondylitis of elbow, right 10/25/2018    Tendonitis of wrist, right 10/25/2018    Carpal tunnel syndrome on left 10/25/2018    Migraine with aura and without status migrainosus, not intractable 2018    Chronic left shoulder pain 2018    Rupture of left biceps tendon 2017    Elevated transaminase level 2017    Iron deficiency anemia 2017    Myofascial pain syndrome 2014    Anxiety 2014    Arthritis 2014    Other depressive disorder 2014    Hyperlipoproteinemia 2014    Hypertension 2014    Memory changes 2014    Postconcussion syndrome 2014     She  has a past surgical history that includes  section; Colonoscopy; Tonsillectomy; pr esophagogastroduodenoscopy transoral diagnostic (N/A, 2017); and pr shldr arthroscop,surg,w/rotat cuff repr (Left, 2017)  Her family history is not on file  She  reports that she quit smoking about 28 years ago  She has never used smokeless tobacco  She reports that she does not drink alcohol or use drugs  Current Outpatient Prescriptions   Medication Sig Dispense Refill    ALPRAZolam (XANAX) 0 25 mg tablet Take 0 25 mg by mouth 3 (three) times a day as needed for anxiety Usually takes 1/2 tab       BOTOX 100 units       butalbital-acetaminophen-caffeine (FIORICET,ESGIC) -40 mg per tablet 1 tablet q6 hours prn migraine  No more than 2-3 per week 15 tablet 2    diazepam (VALIUM) 5 mg tablet One tab 30 mins prior to procedure   May repeat immediately prior if necessary 2 tablet 0    furosemide (LASIX) 20 mg tablet 20 mg every morning  3    hydrochlorothiazide (HYDRODIURIL) 25 mg tablet 25 mg every morning  3    levothyroxine 25 mcg tablet       lidocaine-prilocaine (EMLA) cream Apply topically as needed for mild pain 30 g 0    lisinopril (ZESTRIL) 10 mg tablet Take 20 mg by mouth 2 (two) times a day        metoprolol tartrate (LOPRESSOR) 25 mg tablet Take 12 5 mg by mouth 2 (two) times a day        ondansetron (ZOFRAN-ODT) 4 mg disintegrating tablet Take 1 tablet (4 mg total) by mouth every 6 (six) hours as needed for nausea or vomiting 20 tablet 2    pravastatin (PRAVACHOL) 20 mg tablet Take 20 mg by mouth daily at bedtime         No current facility-administered medications for this visit  Current Outpatient Prescriptions on File Prior to Visit   Medication Sig    ALPRAZolam (XANAX) 0 25 mg tablet Take 0 25 mg by mouth 3 (three) times a day as needed for anxiety Usually takes 1/2 tab     BOTOX 100 units     butalbital-acetaminophen-caffeine (FIORICET,ESGIC) -40 mg per tablet 1 tablet q6 hours prn migraine  No more than 2-3 per week    diazepam (VALIUM) 5 mg tablet One tab 30 mins prior to procedure  May repeat immediately prior if necessary    furosemide (LASIX) 20 mg tablet 20 mg every morning    hydrochlorothiazide (HYDRODIURIL) 25 mg tablet 25 mg every morning    levothyroxine 25 mcg tablet     lidocaine-prilocaine (EMLA) cream Apply topically as needed for mild pain    lisinopril (ZESTRIL) 10 mg tablet Take 20 mg by mouth 2 (two) times a day      metoprolol tartrate (LOPRESSOR) 25 mg tablet Take 12 5 mg by mouth 2 (two) times a day      ondansetron (ZOFRAN-ODT) 4 mg disintegrating tablet Take 1 tablet (4 mg total) by mouth every 6 (six) hours as needed for nausea or vomiting    pravastatin (PRAVACHOL) 20 mg tablet Take 20 mg by mouth daily at bedtime       No current facility-administered medications on file prior to visit  She is allergic to eggs or egg-derived products; iodine; advil [ibuprofen]; and naproxen       Review of Systems   Constitutional: Negative for activity change, appetite change, chills, fatigue and fever  HENT: Negative for rhinorrhea, sneezing and sore throat  Eyes: Negative for visual disturbance     Respiratory: Negative for cough, shortness of breath and wheezing  Cardiovascular: Negative for chest pain, palpitations and leg swelling  Gastrointestinal: Negative for abdominal distention, constipation, diarrhea, nausea and vomiting  Genitourinary: Negative for difficulty urinating, pelvic pain, urgency and vaginal discharge  Neurological: Negative for syncope and light-headedness  Objective:      /88 (BP Location: Left arm, Patient Position: Sitting, Cuff Size: Standard)   Ht 5' (1 524 m)   Wt 58 1 kg (128 lb)   LMP  (LMP Unknown)   Breastfeeding? No   BMI 25 00 kg/m²          Physical Exam   Genitourinary: No breast swelling, tenderness, discharge or bleeding  There is no rash, tenderness, lesion or injury on the right labia  There is no rash, tenderness, lesion or injury on the left labia  Uterus is not deviated, not enlarged, not fixed and not tender  Cervix exhibits no motion tenderness, no discharge and no friability  Right adnexum displays no mass, no tenderness and no fullness  Left adnexum displays no mass, no tenderness and no fullness  No bleeding in the vagina  No vaginal discharge found     Genitourinary Comments: Thin, atrophic vaginal mucosa

## 2018-12-03 ENCOUNTER — OFFICE VISIT (OUTPATIENT)
Dept: CARDIOLOGY CLINIC | Facility: CLINIC | Age: 63
End: 2018-12-03
Payer: COMMERCIAL

## 2018-12-03 VITALS
HEIGHT: 60 IN | BODY MASS INDEX: 24.9 KG/M2 | HEART RATE: 76 BPM | WEIGHT: 126.8 LBS | DIASTOLIC BLOOD PRESSURE: 88 MMHG | SYSTOLIC BLOOD PRESSURE: 138 MMHG

## 2018-12-03 DIAGNOSIS — R01.1 HEART MURMUR: Primary | ICD-10-CM

## 2018-12-03 DIAGNOSIS — R00.1 BRADYCARDIA: ICD-10-CM

## 2018-12-03 DIAGNOSIS — I10 ESSENTIAL HYPERTENSION: ICD-10-CM

## 2018-12-03 PROBLEM — E78.2 MIXED HYPERLIPIDEMIA: Status: ACTIVE | Noted: 2018-12-03

## 2018-12-03 PROCEDURE — 93000 ELECTROCARDIOGRAM COMPLETE: CPT | Performed by: INTERNAL MEDICINE

## 2018-12-03 PROCEDURE — 99213 OFFICE O/P EST LOW 20 MIN: CPT | Performed by: INTERNAL MEDICINE

## 2018-12-03 NOTE — PROGRESS NOTES
Patient ID: Anusha Song is a 61 y o  female  Plan:      Mixed hyperlipidemia  Tolerating statin tx  Hypertension  Adequately controlled  Follow up Plan:  I offered prn f/u but she would like to see me again in 1 year  HPI: The patient is here for f/u regarding hyperlipidemia and HTN  She continues to have only atypical CP  There is a strong family hx of CAD which worries her  No recent change in exertional capacity  Results for orders placed or performed in visit on 18   POCT ECG    Impression    NSR  WNL  Other cardiac testing:  Stress echo 2016 WNL  Past Surgical History:   Procedure Laterality Date     SECTION      COLONOSCOPY      [place] ESOPHAGOGASTRODUODENOSCOPY TRANSORAL DIAGNOSTIC N/A 2017    Procedure: EGD AND COLONOSCOPY;  Surgeon: Roney Carreno MD;  Location: [facility]; Service: Gastroenterology    [place] [address] ARTHROSCOP,SURG,W/ROTAT CUFF REPR Left 2017    Procedure: SHOULDER ARTHROSCOPY ROTATOR CUFF REPAIR; SUBACROMIAL DECOMPRESSION; BICEPS TENODESIS;  Surgeon: Katelyn Mathis DO;  Location: [facility];  Service: Orthopedics    TONSILLECTOMY       CMP:   Lab Results   Component Value Date     (L) 2018    K 4 3 2018    CL 97 (L) 2018    CO2 29 2018    BUN 14 2018    CREATININE 0 69 2018       Lipid Profile: No results found for: CHOL, TRIG, HDL, LDL      Review of Systems   10  point ROS  was otherwise non pertinent or negative except as per HPI or as below  Gait: Normal         Objective:     /88   Pulse 76   Ht 5' (1 524 m)   Wt 57 5 kg (126 lb 12 8 oz)   LMP  (LMP Unknown)   BMI 24 76 kg/m²     PHYSICAL EXAM:    General:  Normal appearance in no distress  Eyes:  Anicteric  Oral mucosa:  Moist   Neck:  No JVD  Carotid upstrokes are brisk without bruits  No masses  Chest:  Clear to auscultation and percussion  Cardiac:  Normal PMI  Normal S1 and S2    No murmur gallop or rub   Abdomen:  Soft and nontender  No palpable organomegaly or aortic enlargement  Extremities:  No peripheral edema  Musculoskeletal:  Symmetric  Vascular:  Femoral pulses are brisk without bruits  Popliteal pulses are intact bilaterally  Pedal pulses are intact  Neuro:  Grossly symmetric  Psych:  Alert and oriented x3  Current Outpatient Prescriptions:     butalbital-acetaminophen-caffeine (FIORICET,ESGIC) -40 mg per tablet, 1 tablet q6 hours prn migraine  No more than 2-3 per week, Disp: 15 tablet, Rfl: 2    lisinopril (ZESTRIL) 20 mg tablet, Take 20 mg by mouth 2 (two) times a day  , Disp: , Rfl:     metoprolol tartrate (LOPRESSOR) 25 mg tablet, Take 12 5 mg by mouth daily  , Disp: , Rfl:     ondansetron (ZOFRAN-ODT) 4 mg disintegrating tablet, Take 1 tablet (4 mg total) by mouth every 6 (six) hours as needed for nausea or vomiting, Disp: 20 tablet, Rfl: 2    pravastatin (PRAVACHOL) 20 mg tablet, Take 20 mg by mouth daily at bedtime  , Disp: , Rfl:     ALPRAZolam (XANAX) 0 25 mg tablet, Take 0 25 mg by mouth 3 (three) times a day as needed for anxiety Usually takes 1/2 tab , Disp: , Rfl:     BOTOX 100 units, , Disp: , Rfl:     diazepam (VALIUM) 5 mg tablet, One tab 30 mins prior to procedure   May repeat immediately prior if necessary (Patient not taking: Reported on 12/3/2018 ), Disp: 2 tablet, Rfl: 0    furosemide (LASIX) 20 mg tablet, 20 mg every morning, Disp: , Rfl: 3    hydrochlorothiazide (HYDRODIURIL) 25 mg tablet, 25 mg every morning, Disp: , Rfl: 3    levothyroxine 25 mcg tablet, , Disp: , Rfl:     lidocaine-prilocaine (EMLA) cream, Apply topically as needed for mild pain (Patient not taking: Reported on 12/3/2018 ), Disp: 30 g, Rfl: 0  Allergies   Allergen Reactions    Eggs Or Egg-Derived Products Anaphylaxis    Iodine Anaphylaxis    Advil [Ibuprofen] Swelling     Lips and eyes swelling    Naproxen Swelling     Lips and eyes     Past Medical History:   Diagnosis Date    Anxiety     Arthritis     Bradycardia     Depression     Edema     Heart murmur     History of echocardiogram 07/18/2016    SEH-hypertensive hemodynamic response, LVH, echo portion is negative for ischemia      History of palpitations     Hyperlipidemia     Hypertension     Migraine     PVD (peripheral vascular disease) (HCC)     Shortness of breath            History   Smoking Status    Former Smoker    Quit date: 1990   Smokeless Tobacco    Never Used

## 2018-12-12 DIAGNOSIS — G43.709 CHRONIC MIGRAINE WITHOUT AURA WITHOUT STATUS MIGRAINOSUS, NOT INTRACTABLE: Primary | ICD-10-CM

## 2018-12-13 RX ORDER — ONABOTULINUMTOXINA 100 [USP'U]/1
INJECTION, POWDER, LYOPHILIZED, FOR SOLUTION INTRADERMAL; INTRAMUSCULAR
Qty: 200 UNITS | Refills: 2 | Status: SHIPPED | OUTPATIENT
Start: 2018-12-13

## 2018-12-24 ENCOUNTER — TELEPHONE (OUTPATIENT)
Dept: OBGYN CLINIC | Facility: CLINIC | Age: 63
End: 2018-12-24

## 2018-12-27 ENCOUNTER — TELEPHONE (OUTPATIENT)
Dept: NEUROLOGY | Facility: CLINIC | Age: 63
End: 2018-12-27

## 2018-12-27 NOTE — TELEPHONE ENCOUNTER
Called [facility]- spoke with Melody- made her aware that I need a refill on the patient's Botox prescription  Refill request was initiated  I did explain to the Melody about the fact that I got a call from a [address] [place] [address] [place] and that her Botox would need to be filled there because of an insurance change  Melody states that there are no notes on the patient's chart- usually there is a flag stating which pharmacy they have to use- she states she was able to run the claim and it is just coming back that it is refill too soon to schedule delivery before 1/28/18  She states they will keep it in there and I will call and do a status check next week  She states there is a note from the patient she called in stating she was unsure if she would still be using that speciality pharmacy- I will contact the patient to clarify this information with her

## 2018-12-27 NOTE — TELEPHONE ENCOUNTER
General 12/19/2018  9:28 AM Jenniffer Evangelista care coordination -   Note    Auth # [id number] valid till 07/09/2019     [facility]      Thank you

## 2019-01-03 NOTE — TELEPHONE ENCOUNTER
Called [facility]- spoke with Eusebio Espinosa- got Botox refill initiated- need to contact the patient to get her permission to ship- okay to fill through [facility]- order is ready for delivery

## 2019-01-04 NOTE — TELEPHONE ENCOUNTER
Called [facility] speciality pharmacy [facility]) and spoke with Delmy Izquierdo- made her aware that I need to refill her Botox prescription  They already have a script on file for the patient  She was made aware that the patient has an appointment on 1/28/19  She is currently doing a coverage check to make sure they have a paid claim- all went well and she confirms that they have a prior authorization on file  The have also reached out to the patient for a welcome call  They will verify all benefits and once received a paid benefit claim they will reach out to both our office and the patient

## 2019-01-04 NOTE — TELEPHONE ENCOUNTER
Called and spoke with the patient for an update on Botox- if we spoke with Maximdomingo to find out which specality pharmacy she is to be using  [address] states as of [place] she can use [facility]  Patient states she prefers to use [facility]- patient states she's had more problems with [facility]  Patient requesting us to imitate Rx through [facility]  jovita check with Antwon Caro to make sure this is okay to do

## 2019-01-10 NOTE — TELEPHONE ENCOUNTER
[address] speciality pharmacy- spoke with Leia Minor- made him aware that I was calling to check the status of the Botox order  He states when I called in 6 days ago the insurance stated it was too early to fill- he ran it again today and it was able to go through, they just need to contact the patient for consent and to take care of her co-pay $150 00  They will contact the patient to take of this

## 2019-01-14 NOTE — TELEPHONE ENCOUNTER
[facility] speciality spoke with [employer]- she states the benefits have been verified patient will be called today to get the patient's consent  Botox delivery set for Thursday 1/17/19 to the [address] [place] [address] Carley Sandravard

## 2019-01-15 NOTE — TELEPHONE ENCOUNTER
Received a call from [facility] at [facility]- she was calling to verify the shipping address, patient's appointment and delivery date  Botox will be delivered on Thursday 1/17/19 at the [address] [place] [address] will be required

## 2019-01-17 NOTE — TELEPHONE ENCOUNTER
Botox received at the [facility] on 1/17/19- Botox stored in the fridge until the patient's upcoming appointment on 1/17/19

## 2019-01-28 ENCOUNTER — PROCEDURE VISIT (OUTPATIENT)
Dept: NEUROLOGY | Facility: CLINIC | Age: 64
End: 2019-01-28
Payer: COMMERCIAL

## 2019-01-28 VITALS — SYSTOLIC BLOOD PRESSURE: 130 MMHG | DIASTOLIC BLOOD PRESSURE: 80 MMHG | TEMPERATURE: 97.9 F

## 2019-01-28 DIAGNOSIS — G43.709 CHRONIC MIGRAINE WITHOUT AURA WITHOUT STATUS MIGRAINOSUS, NOT INTRACTABLE: ICD-10-CM

## 2019-01-28 PROCEDURE — 64615 CHEMODENERV MUSC MIGRAINE: CPT | Performed by: PHYSICIAN ASSISTANT

## 2019-01-28 RX ORDER — BUTALBITAL, ACETAMINOPHEN AND CAFFEINE 50; 325; 40 MG/1; MG/1; MG/1
TABLET ORAL
Qty: 15 TABLET | Refills: 0 | Status: SHIPPED | OUTPATIENT
Start: 2019-01-28 | End: 2019-01-28 | Stop reason: SDUPTHER

## 2019-01-28 RX ORDER — ONDANSETRON 4 MG/1
4 TABLET, ORALLY DISINTEGRATING ORAL EVERY 6 HOURS PRN
Qty: 20 TABLET | Refills: 0 | Status: SHIPPED | OUTPATIENT
Start: 2019-01-28 | End: 2019-08-07 | Stop reason: SDUPTHER

## 2019-01-28 RX ORDER — BUTALBITAL, ACETAMINOPHEN AND CAFFEINE 50; 325; 40 MG/1; MG/1; MG/1
TABLET ORAL
Qty: 15 TABLET | Refills: 2 | Status: SHIPPED | OUTPATIENT
Start: 2019-01-28 | End: 2019-04-30 | Stop reason: SDUPTHER

## 2019-01-28 NOTE — PROGRESS NOTES
Chemodenervation  Date/Time: 1/28/2019 12:57 PM  Performed by: Lynita Habermann  Authorized by: Lynita Habermann     Pre-procedure details:     Prepped With: Alcohol    Procedure details:     Position:  Upright  Botox:     Botox Type:  Type A    Brand:  Botox    mL's of Botulinum Toxin:  160    Final Concentration per CC:  50 units    Needle Gauge:  30 G 2 5 inch  Procedures:     Botox Procedures: chronic headache      Indications: migraines    Injection Location:     Head / Face:  L , R , L frontalis, R frontalis, R inferior cervical paraspinal, L inferior cervical paraspinal, L medial occipitalis, R medial occipitalis, L lateral occipitalis, R lateral occipitalis, procerus, L temporalis, R temporalis, R superior trapezius and L superior trapezius    L  injection amount:  5 unit(s)    R  injection amount:  5 unit(s)    L lateral frontalis:  5 unit(s)    R lateral frontalis:  5 unit(s)    L medial frontalis:  5 unit(s)    R medial frontalis:  5 unit(s)    L temporalis injection amount:  20 unit(s)    R temporalis injection amount:  20 unit(s)    Procerus injection amount:  5 unit(s)    L lateral occipitalis injection amount:  5 unit(s)    R lateral occipitalis injection amount:  5 unit(s)    L medial occipitalis injection amount:  10 unit(s)    R medial occipitalis injection amount:  10 unit(s)    L inferior cervical paraspinal injection amount:  10 unit(s)    R inferior cervical paraspinal injection amount:  10 unit(s)    L superior trapezius injection amount:  15 unit(s)    R superior trapezius injection amount:  15 unit(s)  Total Units:     Total units used:  160    Total units discarded:  40  Post-procedure details:     Chemodenervation:  Chronic migraine    Facial Nerve Location[de-identified]  Bilateral facial nerve    Patient tolerance of procedure:   Tolerated well, no immediate complications  Comments:      Extra 2 5 units in the left lateral frontalis and extra 2 5 units in the right lateral frontalis, medically necessary  Vitals:    01/28/19 1223   BP: 130/80   Temp: 97 9 °F (36 6 °C)       This note was sent to Dr Chris Navarro to co-sign in Dr Simeon Sanches absence  Zofran and Fioricet both refilled today

## 2019-04-09 ENCOUNTER — TELEPHONE (OUTPATIENT)
Dept: NEUROLOGY | Facility: CLINIC | Age: 64
End: 2019-04-09

## 2019-04-30 ENCOUNTER — PROCEDURE VISIT (OUTPATIENT)
Dept: NEUROLOGY | Facility: CLINIC | Age: 64
End: 2019-04-30
Payer: COMMERCIAL

## 2019-04-30 VITALS — SYSTOLIC BLOOD PRESSURE: 118 MMHG | DIASTOLIC BLOOD PRESSURE: 86 MMHG | TEMPERATURE: 97.9 F | HEART RATE: 70 BPM

## 2019-04-30 DIAGNOSIS — G43.709 CHRONIC MIGRAINE WITHOUT AURA WITHOUT STATUS MIGRAINOSUS, NOT INTRACTABLE: Primary | ICD-10-CM

## 2019-04-30 DIAGNOSIS — M54.2 NECK PAIN ON LEFT SIDE: ICD-10-CM

## 2019-04-30 PROCEDURE — 64615 CHEMODENERV MUSC MIGRAINE: CPT | Performed by: PHYSICIAN ASSISTANT

## 2019-04-30 RX ORDER — BUTALBITAL, ACETAMINOPHEN AND CAFFEINE 50; 325; 40 MG/1; MG/1; MG/1
TABLET ORAL
Qty: 15 TABLET | Refills: 2 | Status: SHIPPED | OUTPATIENT
Start: 2019-04-30 | End: 2019-08-02 | Stop reason: SDUPTHER

## 2019-04-30 RX ORDER — METHOCARBAMOL 500 MG/1
TABLET, FILM COATED ORAL
Qty: 30 TABLET | Refills: 2 | Status: SHIPPED | OUTPATIENT
Start: 2019-04-30 | End: 2020-01-14

## 2019-05-31 ENCOUNTER — TELEPHONE (OUTPATIENT)
Dept: NEUROLOGY | Facility: CLINIC | Age: 64
End: 2019-05-31

## 2019-07-15 ENCOUNTER — TELEPHONE (OUTPATIENT)
Dept: NEUROLOGY | Facility: CLINIC | Age: 64
End: 2019-07-15

## 2019-07-15 NOTE — TELEPHONE ENCOUNTER
Type Date User Summary Attachment   General 07/15/2019 11:07  [address] coordination  -   Note    Botox- authorization #: [id number]- valid for 4 visits- from 7/15/2019 until 7/15/2020   Please use [facility]      Thank you,     Harvinder Mcneil

## 2019-07-15 NOTE — TELEPHONE ENCOUNTER
Called [facility]- spoke with Ian- I advised her I was calling to set up delivery and provide her with the new authorization information  She was able to re-process the patient's Botox order- she states it did not go through her insurance  She will try running it tomorrow  Delivery scheduled for Tuesday 7/23/2019 to the [facility] location- signature will be required

## 2019-07-23 NOTE — TELEPHONE ENCOUNTER
Ashley Champion from [employer] called to inform us patient's botox was shipped [facility] next [facility] [employer] and was to be delievered today  However, due to weather delivery was rescheduled to tomorrow (7/24/19)  If cool to touch okay to use, if it is not - will need replacement  Please call Ashley Champion if not ([phone])

## 2019-07-23 NOTE — TELEPHONE ENCOUNTER
Demetra,    When patient's Botox order arrives tomorrow please check to see if the Botox is still cold  If the Botox is warm to touch please let me know and I will contact the specialty pharmacy to send out a new shipment        Thank you,    Ifrah Khoury

## 2019-07-24 NOTE — TELEPHONE ENCOUNTER
Called and spoke with Henrietta at [address] [place] [address]  I explained to her that I received a call from Yuriy Arias yesterday in regards to the patient's order being delayed due to weather  I advised her that the patient's Botox was just received and is warm and no longer viable  She spoke with Yuriy Arias and confirmed that we would be able to get a replacement sent to us  She states the Botox will arrive on Wednesday 7/31/2019 to the [facility] location  She states inside the Botox of the replacement will be a return label- she requests that we keep the "not good" Botox and place the return label on the Botox once we receive the replacement  Demetra,    Please let me know once the order is received  When the order arrives please make sure the vial is still cold  Also please make sure you get the return label out of the box and place the label on the "bad" Botox box so we can ship the Botox back  Thank you!     Teresa Carpenter

## 2019-07-30 NOTE — TELEPHONE ENCOUNTER
Noted thank you! Please call [facility] to  the package- let me know if you have any other questions  Thanks!     [facility]

## 2019-07-30 NOTE — TELEPHONE ENCOUNTER
RECEIVED NEW BOTOX, IT WAS COLD AND IS STORED IN [place] AT [facility]  HAVE RETURN LABEL- ANYTHING SPECIAL THAT NEEDS TO BE DONE  Yane Perea LIKE DO I CALL UPS TO P/U? OR WILL YOU BE CALLING TO SET THAT UP?

## 2019-08-02 ENCOUNTER — PROCEDURE VISIT (OUTPATIENT)
Dept: NEUROLOGY | Facility: CLINIC | Age: 64
End: 2019-08-02
Payer: COMMERCIAL

## 2019-08-02 VITALS — TEMPERATURE: 97.7 F | SYSTOLIC BLOOD PRESSURE: 156 MMHG | DIASTOLIC BLOOD PRESSURE: 83 MMHG | HEART RATE: 59 BPM

## 2019-08-02 DIAGNOSIS — G43.709 CHRONIC MIGRAINE WITHOUT AURA WITHOUT STATUS MIGRAINOSUS, NOT INTRACTABLE: Primary | ICD-10-CM

## 2019-08-02 PROCEDURE — 64615 CHEMODENERV MUSC MIGRAINE: CPT | Performed by: PSYCHIATRY & NEUROLOGY

## 2019-08-02 RX ORDER — BUTALBITAL, ACETAMINOPHEN AND CAFFEINE 50; 325; 40 MG/1; MG/1; MG/1
TABLET ORAL
Qty: 15 TABLET | Refills: 2 | Status: SHIPPED | OUTPATIENT
Start: 2019-08-02 | End: 2019-09-23 | Stop reason: SDUPTHER

## 2019-08-02 NOTE — PROGRESS NOTES
Chemodenervation  Date/Time: 8/2/2019 9:41 AM  Performed by: Kevon Leiva PA-C  Authorized by: Kevon Leiva PA-C     Pre-procedure details:     Prepped With: Alcohol    Procedure details:     Position:  Upright  Botox:     Botox Type:  Type A    Brand:  Botox    mL's of Botulinum Toxin:  170    Final Concentration per CC:  50 units    Needle Gauge:  30 G 2 5 inch  Procedures:     Botox Procedures: chronic headache      Indications: migraines    Injection Location:     Head / Face:  L , R , L frontalis, R frontalis, R inferior cervical paraspinal, L inferior cervical paraspinal, L medial occipitalis, R medial occipitalis, L lateral occipitalis, R lateral occipitalis, procerus, L temporalis, R temporalis, R superior trapezius and L superior trapezius    L  injection amount:  5 unit(s)    R  injection amount:  5 unit(s)    L lateral frontalis:  5 unit(s)    R lateral frontalis:  5 unit(s)    L medial frontalis:  5 unit(s)    R medial frontalis:  5 unit(s)    L temporalis injection amount:  20 unit(s)    R temporalis injection amount:  20 unit(s)    Procerus injection amount:  0 unit(s)    L lateral occipitalis injection amount:  5 unit(s)    R lateral occipitalis injection amount:  5 unit(s)    L medial occipitalis injection amount:  10 unit(s)    R medial occipitalis injection amount:  10 unit(s)    L inferior cervical paraspinal injection amount:  10 unit(s)    R inferior cervical paraspinal injection amount:  10 unit(s)    L superior trapezius injection amount:  15 unit(s)    R superior trapezius injection amount:  15 unit(s)  Total Units:     Total units used:  170    Total units discarded:  30  Post-procedure details:     Chemodenervation:  Chronic migraine    Facial Nerve Location[de-identified]  Bilateral facial nerve    Patient tolerance of procedure: Tolerated well, no immediate complications  Comments:      Extra 20 units in the scalp b/l, medically necessary        Vitals: 08/02/19 0937   BP: 156/83   Pulse: 59   Temp: 97 7 °F (36 5 °C)

## 2019-08-07 ENCOUNTER — TELEPHONE (OUTPATIENT)
Dept: NEUROLOGY | Facility: CLINIC | Age: 64
End: 2019-08-07

## 2019-08-07 DIAGNOSIS — G43.709 CHRONIC MIGRAINE WITHOUT AURA WITHOUT STATUS MIGRAINOSUS, NOT INTRACTABLE: ICD-10-CM

## 2019-08-07 RX ORDER — DEXAMETHASONE 2 MG/1
TABLET ORAL
Qty: 5 TABLET | Refills: 0 | Status: SHIPPED | OUTPATIENT
Start: 2019-08-07 | End: 2019-08-09

## 2019-08-07 RX ORDER — ONDANSETRON 4 MG/1
4 TABLET, ORALLY DISINTEGRATING ORAL EVERY 6 HOURS PRN
Qty: 20 TABLET | Refills: 0 | Status: SHIPPED | OUTPATIENT
Start: 2019-08-07 | End: 2020-02-18

## 2019-08-07 NOTE — TELEPHONE ENCOUNTER
Please contact the pt and tell her that I am refilling zofran  As per Jolynn LINARES  She has n/emesis  Also I would recommend steroid taper or depakote as fioricet is not helping migraine long term at this point  Per Jolynn she has been taking 3x per day x 5 days

## 2019-08-07 NOTE — TELEPHONE ENCOUNTER
----- Message from Kevon Leiva PA-C sent at 8/7/2019 10:42 AM EDT -----  Regarding: RE: Botox  Contact: [phone]  Can you please call her back and tell her I am very sorry  The ptosis will wear off over the next few days  We will inject less botox next time  I did nothing different with the injections, even avoided procerus as asked last time  Sometimes this happens even after multiple injections in the past  I will inject fewer at next visit  She can take fioricet for the headaches  If she needs anything different please let me know  Again very sorry  ----- Message -----  From: Africa Florian MA  Sent: 8/7/2019   8:46 AM EDT  To: Kevon Leiva PA-C, Amparo Murphy MA  Subject: Botox                                            Patient just called stating that she has bad black and blues all over her forehead, etc  Her eye lids are extremely droopy and need to be held up in order for her to keep her eyes open  She has not put her contact lenses in and she won't drive because of it  Pain is extreme where the injections were and her headaches are worse now  She states that she knows that these are potential side effects but she has never had them before   She is not complaining; just wants to know if there is anything that she can do that would help

## 2019-08-08 NOTE — TELEPHONE ENCOUNTER
Patient stated she cannot take steroids, will make her very sick  Please send depakote instead  She did  the zofran, just got home, hasn't taken it yet

## 2019-08-09 RX ORDER — DIVALPROEX SODIUM 250 MG/1
TABLET, DELAYED RELEASE ORAL
Qty: 6 TABLET | Refills: 0 | Status: SHIPPED | OUTPATIENT
Start: 2019-08-09 | End: 2020-01-14

## 2019-09-23 ENCOUNTER — TELEPHONE (OUTPATIENT)
Dept: NEUROLOGY | Facility: CLINIC | Age: 64
End: 2019-09-23

## 2019-09-23 DIAGNOSIS — G43.709 CHRONIC MIGRAINE WITHOUT AURA WITHOUT STATUS MIGRAINOSUS, NOT INTRACTABLE: ICD-10-CM

## 2019-09-23 RX ORDER — BUTALBITAL, ACETAMINOPHEN AND CAFFEINE 50; 325; 40 MG/1; MG/1; MG/1
TABLET ORAL
Qty: 15 TABLET | Refills: 2 | Status: SHIPPED | OUTPATIENT
Start: 2019-09-23 | End: 2019-11-08 | Stop reason: SDUPTHER

## 2019-09-23 RX ORDER — AMITRIPTYLINE HYDROCHLORIDE 10 MG/1
TABLET, FILM COATED ORAL
Qty: 60 TABLET | Refills: 2 | Status: SHIPPED | OUTPATIENT
Start: 2019-09-23 | End: 2019-12-15 | Stop reason: SDUPTHER

## 2019-09-23 NOTE — TELEPHONE ENCOUNTER
Patient called the [place] office asking if you could give her a call she would like to speak with you about the Botox she will be receiving in November   TN# [phone]

## 2019-09-25 NOTE — TELEPHONE ENCOUNTER
[facility] clinical team:  If the patient calls back to make an appointment I highly recommend that she make an appointment with the attending neurologist as she has not seen the neurologist in several years

## 2019-09-25 NOTE — TELEPHONE ENCOUNTER
Late entry: I called her back on the day that she called us  We spoke in detail about her worsening migraines, states she also has pain in the frontal region at the previous Botox injection sites which is not getting any better, and eyelid weakness  She states she has to hold her eyes open, has difficulty putting on makeup  I asked her if she could come in the office so I can take a look and reexamine her, make some recommendations, etc   She states that she cannot come into the office because she is watching her granddaughter  She declined to make an appointment within the next 3 months  I did offer that she try amitriptyline  She was on this in the past and she does not remember if she had side effects, or if it was effective for her headaches  I reviewed the side effects in detail with her over the phone  She can take Fioricet but no more than 2-3 per week to prevent medication overuse headache  I provided reassurance that if from Botox the weakness in the forehead and eyelids will improve, however if she does not notice any improvement she needs to come into the office for re-examination / re-evaluation  She denies any new or worsening focal deficits

## 2019-09-30 NOTE — TELEPHONE ENCOUNTER
Pt called stated that she feels much better, states "all of a sudden it got tremendously better"  She plans to come back with the next injections  Reports that as of tomorrow she has new insurance, medicare  She will fax over copies of her new cards  Fax provided  She did not want to schedule an appt with the attending now because her schedule is too busy  She would like to do that when she returns for her appt in November

## 2019-09-30 NOTE — TELEPHONE ENCOUNTER
Please recommend the patient schedule her next injections with an attending; I am hesitant to give her more injections without an attending seeing her  She had significant side effects/ adverse event to the last Botox injection that I gave her, per her history, and I do not feel comfortable injecting again at this time  Cc Dr Xiao Snider does not have to see her, but anyone who performs injections and willing to see this patient needs to see her at the next visit

## 2019-10-01 NOTE — TELEPHONE ENCOUNTER
She typically gets significant benefit from Botox  She has had multiple botox injections with me and never had adverse effects with me  The patient mentioned 1 time that she had droopiness in her forehead with injections in the procerus, so we avoid this location  During the last treatment, I did not change the way I injected or increased the dose or change the dose in any way  Thank you for your opinion

## 2019-10-01 NOTE — TELEPHONE ENCOUNTER
[id number] [address] Witt I will call her back if we do not hear by the end of the week  We need to explain that I want the physician to do botox next  Thanks

## 2019-10-01 NOTE — TELEPHONE ENCOUNTER
I spoke with the patient to schedule an appointment with Dr Merlin Chaudhary  I explained to her that [facility] had recommended for this appointment  She stated that she will wait for her Botox appointment in Nov in the [place] location to set this appointment up to have a better look at her schedule  I made her aware that I work closely with the Doctor schedule and I can better help her with getting her in  She stated she will call me back as she has to take care of her spouse  Will await for her call back

## 2019-10-07 ENCOUNTER — TELEPHONE (OUTPATIENT)
Dept: NEUROLOGY | Facility: CLINIC | Age: 64
End: 2019-10-07

## 2019-10-07 NOTE — TELEPHONE ENCOUNTER
Patient called and l/m on [place] asking for [address] to give her a call when she gets a chance  She did not say what it was regarding

## 2019-10-07 NOTE — TELEPHONE ENCOUNTER
[address] [facility],    I spoke with the patient regarding the appointment  She had some confusion as to what the appointment was for and I explained to her that the appointment with you is to be canceled and to be rescheduled with Dr LEW for the Botox due to the side effects that she called in  She then stated that all of that side effects are gone  I explained again that this is a recommendations by the providers and she verbalized understanding and gave her an appointment at [facility] location but she stated it is too far for her that she will either have to leave her  home or not get the botox at all  Please be advise that there are no appointments available in November for Dr Tr Saab in [place] for the time the patient is due for Botox

## 2019-10-11 NOTE — TELEPHONE ENCOUNTER
Spoke with the patient  She is currently on the wait list to see Dr Melinda Navarro in follow-up  I agreed to see her for repeat Botox injection in November but I am only injecting 100 units and it superior frontalis, avoiding  and procerus  Also injecting less in the CPs and traps  Agustina Mayo- can you please make sure she gets into see Dr Grijalva [place] at least between Jan- March? She cannot come to make f/u until January due to her 's medical issues/ appts  Thanks

## 2019-10-14 ENCOUNTER — TELEPHONE (OUTPATIENT)
Dept: NEUROLOGY | Facility: CLINIC | Age: 64
End: 2019-10-14

## 2019-10-14 NOTE — TELEPHONE ENCOUNTER
Spoke to patient, confirmed that she faxed over her cards ATTN: [facility] today   Patient also would like to know what percentage of the Botox will by covered by her insurance

## 2019-10-14 NOTE — TELEPHONE ENCOUNTER
Received a call from [facility]- she states she is calling to set schedule the patient's Botox order  I confirmed a delivery date for Friday  However, upon running it through the patient's insurance her [facility] is no longer active  Will need to contact the patient- it looks like from the last encounter she has medicare  I did not receive a copy of her medicare cards yet  Huma Carl,    Can you please reach out to the patient and have her get us a copy of her Medicare cards as soon as possible  Thank you!     Juventino Bhandari

## 2019-10-15 ENCOUNTER — DOCUMENTATION (OUTPATIENT)
Dept: NEUROLOGY | Facility: CLINIC | Age: 64
End: 2019-10-15

## 2019-10-15 NOTE — PROGRESS NOTES
Referral Notes   Number of Notes: 1   Type Date User Summary Attachment   General 10/15/2019 11:45 AM Arminda Thayer care coordination  -   Note    Botox- no authorization needed   Please use our stock      Thank you,     Herminia Chapa

## 2019-10-15 NOTE — TELEPHONE ENCOUNTER
Spoke with the patient- I made her aware that I have received her insurance cards which will be scanned into the patient's chart under media and registration  I also discussed coverage with the patient  She is aware that medicare pays 80/20  Patient is aware that she will be responsible for 20% which is roughly about $250 00 after each injection  Patient is aware that since she has secondary insurance they most likely will  the remainder (as long as she has no out of pocket expense or deductible)  Patient verbally understood

## 2019-11-08 ENCOUNTER — PROCEDURE VISIT (OUTPATIENT)
Dept: NEUROLOGY | Facility: CLINIC | Age: 64
End: 2019-11-08
Payer: MEDICARE

## 2019-11-08 VITALS — SYSTOLIC BLOOD PRESSURE: 142 MMHG | TEMPERATURE: 97.4 F | HEART RATE: 64 BPM | DIASTOLIC BLOOD PRESSURE: 87 MMHG

## 2019-11-08 DIAGNOSIS — G43.709 CHRONIC MIGRAINE WITHOUT AURA WITHOUT STATUS MIGRAINOSUS, NOT INTRACTABLE: Primary | ICD-10-CM

## 2019-11-08 PROCEDURE — 64615 CHEMODENERV MUSC MIGRAINE: CPT | Performed by: PHYSICIAN ASSISTANT

## 2019-11-08 RX ORDER — BUTALBITAL, ACETAMINOPHEN AND CAFFEINE 50; 325; 40 MG/1; MG/1; MG/1
TABLET ORAL
Qty: 15 TABLET | Refills: 2 | Status: SHIPPED | OUTPATIENT
Start: 2019-11-08 | End: 2021-01-13 | Stop reason: ALTCHOICE

## 2019-11-08 NOTE — PROGRESS NOTES
Chemodenervation  Date/Time: 11/8/2019 9:41 AM  Performed by: Dariela Toney PA-C  Authorized by: Dariela Toney PA-C     Pre-procedure details:     Prepped With: Alcohol    Procedure details:     Position:  Upright  Botox:     Botox Type:  Type A    Brand:  Botox    mL's of Botulinum Toxin:  100    Final Concentration per CC:  50 units    Needle Gauge:  30 G 2 5 inch  Procedures:     Botox Procedures: chronic headache      Indications: migraines    Injection Location:     Head / Face:  L , R , L frontalis, R frontalis, R inferior cervical paraspinal, L inferior cervical paraspinal, L medial occipitalis, R medial occipitalis, L lateral occipitalis, R lateral occipitalis, procerus, L temporalis, R temporalis, R superior trapezius and L superior trapezius    L  injection amount:  0 unit(s)    R  injection amount:  0 unit(s)    L lateral frontalis:  0 unit(s)    R lateral frontalis:  0 unit(s)    L medial frontalis:  0 unit(s)    R medial frontalis:  0 unit(s)    L temporalis injection amount:  10 unit(s)    R temporalis injection amount:  10 unit(s)    Procerus injection amount:  0 unit(s)    L lateral occipitalis injection amount:  0 unit(s)    R lateral occipitalis injection amount:  0 unit(s)    L medial occipitalis injection amount:  10 unit(s)    R medial occipitalis injection amount:  10 unit(s)    L inferior cervical paraspinal injection amount:  10 unit(s)    R inferior cervical paraspinal injection amount:  10 unit(s)    L superior trapezius injection amount:  0 unit(s)    R superior trapezius injection amount:  0 unit(s)  Total Units:     Total units used:  100    Total units discarded:  0  Post-procedure details:     Chemodenervation:  Chronic migraine    Facial Nerve Location[de-identified]  Bilateral facial nerve    Patient tolerance of procedure: Tolerated well, no immediate complications  Comments:      Pt requests injections directly beneath each eyebrow   I injected 2 5 units directly beneath the lateral 1/3 of each eyebrow (total 5 units extra), medically necessary, and total of 35 units spread between the scalp and superior frontalis b/l, all medically necessary  2 cc: 100 units botox      Vitals:    11/08/19 0949   BP: 142/87   Pulse: 64   Temp: (!) 97 4 °F (36 3 °C)

## 2019-12-05 ENCOUNTER — TELEPHONE (OUTPATIENT)
Dept: NEUROLOGY | Facility: CLINIC | Age: 64
End: 2019-12-05

## 2019-12-05 NOTE — TELEPHONE ENCOUNTER
Patient returned call, is very upset that [facility] location/schedule are changing in the new year  Attempted to get patient rescheduled with Dr Shirley Beltran or Dr Iván Mckeon  Patient declined  States her  is in kidney failure and can no longer  her to her appointments anymore  She lives in the [place] [id number] and all our locations are a far drive  States she is "done with [facility]  Jasbir Meyer can call her if she want to but she will be researching other Neurologists today"  Since her last round of Botox, patient has been "doing ok"  States she has not been headache free but theyre not as bad as they were  Patient expressed that while she wants to remain loyal to [facility] as a provider, she is unhappy that "[facility] keeping shuffling their doctors like naomi"  I did not cancel her Botox appointment in February yet  Let Jaime ALDANA aware of the situation

## 2019-12-05 NOTE — TELEPHONE ENCOUNTER
Spoke to patient's  to make aware that patient's Botox has to be rescheduled due to location change  Patient to call back when she gets home

## 2019-12-09 ENCOUNTER — TELEPHONE (OUTPATIENT)
Dept: OBGYN CLINIC | Facility: CLINIC | Age: 64
End: 2019-12-09

## 2019-12-09 DIAGNOSIS — Z12.31 VISIT FOR SCREENING MAMMOGRAM: Primary | ICD-10-CM

## 2019-12-15 DIAGNOSIS — G43.709 CHRONIC MIGRAINE WITHOUT AURA WITHOUT STATUS MIGRAINOSUS, NOT INTRACTABLE: ICD-10-CM

## 2019-12-16 RX ORDER — AMITRIPTYLINE HYDROCHLORIDE 10 MG/1
TABLET, FILM COATED ORAL
Qty: 180 TABLET | Refills: 0 | Status: SHIPPED | OUTPATIENT
Start: 2019-12-16 | End: 2020-01-14

## 2020-01-14 ENCOUNTER — OFFICE VISIT (OUTPATIENT)
Dept: CARDIOLOGY CLINIC | Facility: CLINIC | Age: 65
End: 2020-01-14
Payer: MEDICARE

## 2020-01-14 VITALS
WEIGHT: 136 LBS | DIASTOLIC BLOOD PRESSURE: 92 MMHG | SYSTOLIC BLOOD PRESSURE: 168 MMHG | BODY MASS INDEX: 26.7 KG/M2 | HEIGHT: 60 IN | HEART RATE: 65 BPM

## 2020-01-14 DIAGNOSIS — R60.9 EDEMA, UNSPECIFIED TYPE: ICD-10-CM

## 2020-01-14 DIAGNOSIS — R07.9 CHEST PAIN, UNSPECIFIED TYPE: Primary | ICD-10-CM

## 2020-01-14 DIAGNOSIS — R00.1 BRADYCARDIA: ICD-10-CM

## 2020-01-14 DIAGNOSIS — R06.02 SOB (SHORTNESS OF BREATH): ICD-10-CM

## 2020-01-14 DIAGNOSIS — I10 ESSENTIAL HYPERTENSION: ICD-10-CM

## 2020-01-14 DIAGNOSIS — E78.2 MIXED HYPERLIPIDEMIA: ICD-10-CM

## 2020-01-14 PROCEDURE — 99214 OFFICE O/P EST MOD 30 MIN: CPT | Performed by: PHYSICIAN ASSISTANT

## 2020-01-14 PROCEDURE — 93000 ELECTROCARDIOGRAM COMPLETE: CPT | Performed by: PHYSICIAN ASSISTANT

## 2020-01-14 RX ORDER — HYDRALAZINE HYDROCHLORIDE 10 MG/1
25 TABLET, FILM COATED ORAL 2 TIMES DAILY
Qty: 60 TABLET | Refills: 3 | Status: SHIPPED | OUTPATIENT
Start: 2020-01-14 | End: 2020-02-10 | Stop reason: SDUPTHER

## 2020-01-14 RX ORDER — TRIAMTERENE AND HYDROCHLOROTHIAZIDE 37.5; 25 MG/1; MG/1
TABLET ORAL
COMMUNITY
Start: 2019-12-31 | End: 2020-01-14

## 2020-01-14 NOTE — ASSESSMENT & PLAN NOTE
Atypical for CHF,   However, BNP and echo will be checked    Appears to be an allergic reaction to HCTZ (poosibly a sulfa allergy) with a pruritic rash to the face, neck and chest  Benadryl is prescribed

## 2020-01-14 NOTE — PATIENT INSTRUCTIONS
STOP the following medications:  triamterene HCT    START the following NEW medications:  Hydralazine 25 mg twice daily  Benadryl 50 mg every 8 hours as needed (diphenhyromine)    You are to get the following tests before our next visit together  Echo, Nuclear stress test and labwork    I will follow with  you in one month

## 2020-01-14 NOTE — ASSESSMENT & PLAN NOTE
Atypical for coronary insufficency, but given risk factors of HTN, HLD and family history as well as being post menopausal,  I am going to get an exercise nuclear stress test to be sure that ischemia is not the cause of her pain

## 2020-01-14 NOTE — ASSESSMENT & PLAN NOTE
Intolerant v allergic to multiple drugs  Possible sulfa allergy  Benadryl prescribed (OTC)  Hydralazine BID for BP control

## 2020-01-14 NOTE — PROGRESS NOTES
[facility]   Outpatient Note  Aurora Scott  1955  [id number]  [facility], [facility] [place]  [address]  [place] [address]  [place] [address]    Aurora Scott is a 59 y o  female    Assessment and Plan:   1  Chest pain, unspecified type  Assessment & Plan:  Atypical for coronary insufficency, but given risk factors of HTN, HLD and family history as well as being post menopausal,  I am going to get an exercise nuclear stress test to be sure that ischemia is not the cause of her pain  Orders:  -     NM myocardial perfusion spect (stress and/or rest); Future; Expected date: 01/14/2020  -     Echo complete with contrast if indicated; Future; Expected date: 01/14/2020  -     NT-BNP PRO; Future    2  SOB (shortness of breath)  Assessment & Plan:  See comments with regard to testing     Orders:  -     NM myocardial perfusion spect (stress and/or rest); Future; Expected date: 01/14/2020  -     Echo complete with contrast if indicated; Future; Expected date: 01/14/2020  -     NT-BNP PRO; Future    3  Edema, unspecified type  Assessment & Plan:  Atypical for CHF,   However, BNP and echo will be checked    Appears to be an allergic reaction to HCTZ (poosibly a sulfa allergy) with a pruritic rash to the face, neck and chest  Benadryl is prescribed    Orders:  -     Echo complete with contrast if indicated; Future; Expected date: 01/14/2020  -     NT-BNP PRO; Future  -     hydrALAZINE (APRESOLINE) 10 mg tablet; Take 2 5 tablets (25 mg total) by mouth 2 (two) times a day  -     Basic metabolic panel    4  Essential hypertension  Assessment & Plan:  Intolerant v allergic to multiple drugs  Possible sulfa allergy  Benadryl prescribed (OTC)  Hydralazine BID for BP control     Orders:  -     NM myocardial perfusion spect (stress and/or rest); Future; Expected date: 01/14/2020  -     Echo complete with contrast if indicated;  Future; Expected date: 01/14/2020  -     hydrALAZINE (APRESOLINE) 10 mg tablet; Take 2 5 tablets (25 mg total) by mouth 2 (two) times a day  -     Basic metabolic panel    5  Bradycardia  Assessment & Plan: This has resolved  May have been from BB for BP treatment      Orders:  -     POCT ECG    6  Mixed hyperlipidemia  Assessment & Plan: On a statin and tolerating          Additional Plan:   Medications as detailed above  Surveillance testing as outlined  Return visit will be in one month or earlier if there are problems  The patient is encouraged to call in the meantime if there are questions or concerns  Subjective: The patient is referred by her PCP for complaints of chest discomfort that is described as a deep sensation of tightness in an arch above janell left breast when she is active around the house  It lasts about 30 seconds to a few minutes in duration and is not alleviated or aggravated by anything  It is associated with nausea and sensation of felling very hot  She was found to be with high BP about 6 months ago and has been trying multiple medications two of the medications she has tried have caused a rash on her face, neck and chest  She has also noted her thighs are swelling  There is no rash on her thighs  Her legs have no edema  She has no orthopnea or PND  She has no exertional symptoms per se and has no palpations or syncope  She has no TIA or CVA syncope  It is unclear as to weather these symptoms are all related   Chest Pain    This is a new problem  The current episode started more than 1 month ago  The onset quality is undetermined  The problem occurs intermittently  The problem has been unchanged  The pain is present in the lateral region  The pain is at a severity of 3/10  The pain is mild  The quality of the pain is described as dull, heavy and tightness  The pain does not radiate  Associated symptoms include nausea and shortness of breath   Pertinent negatives include no claudication, cough, hemoptysis, irregular heartbeat, near-syncope, orthopnea, palpitations, PND, sputum production or syncope  Associated symptoms comments: Warmth/hot  The pain is aggravated by nothing  She has tried nothing for the symptoms  The treatment provided no relief  Risk factors include post-menopausal and smoking/tobacco exposure  Her past medical history is significant for hyperlipidemia and hypertension  Her family medical history is significant for CAD, heart disease, hypertension and early MI  Social History  Social History     Tobacco Use   Smoking Status Former Smoker    Last attempt to quit: Halina Zavala Years since quittin 0   Smokeless Tobacco Never Used   ,   Social History     Substance and Sexual Activity   Alcohol Use No   ,   Social History     Substance and Sexual Activity   Drug Use No     No family history on file  Medical and Surgical History  Past Medical History:   Diagnosis Date    Anxiety     Arthritis     Bradycardia     Depression     Edema     Heart murmur     History of echocardiogram 2016    SEH-hypertensive hemodynamic response, LVH, echo portion is negative for ischemia   History of palpitations     Hyperlipidemia     Hypertension     Migraine     PVD (peripheral vascular disease) (HCC)     Shortness of breath      Past Surgical History:   Procedure Laterality Date     SECTION      COLONOSCOPY      LA ESOPHAGOGASTRODUODENOSCOPY TRANSORAL DIAGNOSTIC N/A 2017    Procedure: EGD AND COLONOSCOPY;  Surgeon: Karin Persaud MD;  [facility];   Service: Gastroenterology    [place] [address] ARTHROSCOP,SURG,W/ROTAT CUFF REPR Left 2017    Procedure: SHOULDER ARTHROSCOPY ROTATOR CUFF REPAIR; SUBACROMIAL DECOMPRESSION; BICEPS TENODESIS;  Surgeon: Bryce Hernandez DO;  [facility];  Service: Orthopedics    TONSILLECTOMY           Current Outpatient Medications:     ALPRAZolam (XANAX) 0 25 mg tablet, Take 0 25 mg by mouth 3 (three) times a day as needed for anxiety Usually takes 1/2 tab , Disp: , Rfl:     BOTOX 100 units, Inject 155 units intramuscularly every 90 days, Disp: 200 Units, Rfl: 2    butalbital-acetaminophen-caffeine (FIORICET,ESGIC) -40 mg per tablet, 1 tablet q6 hours prn migraine  No more than 2-3 per week, Disp: 15 tablet, Rfl: 2    ondansetron (ZOFRAN-ODT) 4 mg disintegrating tablet, Take 1 tablet (4 mg total) by mouth every 6 (six) hours as needed for nausea or vomiting, Disp: 20 tablet, Rfl: 0    pravastatin (PRAVACHOL) 20 mg tablet, Take 20 mg by mouth daily at bedtime  , Disp: , Rfl:     hydrALAZINE (APRESOLINE) 10 mg tablet, Take 2 5 tablets (25 mg total) by mouth 2 (two) times a day, Disp: 60 tablet, Rfl: 3  Allergies   Allergen Reactions    Eggs Or Egg-Derived Products Anaphylaxis    Iodine Anaphylaxis    Decadron [Dexamethasone] Nausea Only     Any steroid per pt   Advil [Ibuprofen] Swelling     Lips and eyes swelling    Naproxen Swelling     Lips and eyes       Review of Systems   Constitution: Negative  HENT: Negative  Eyes: Negative  Cardiovascular: Positive for chest pain and leg swelling (thighs but not legs)  Negative for claudication, cyanosis, dyspnea on exertion, irregular heartbeat, near-syncope, orthopnea, palpitations, paroxysmal nocturnal dyspnea and syncope  Respiratory: Positive for shortness of breath  Negative for cough, hemoptysis, sleep disturbances due to breathing, snoring, sputum production and wheezing  Endocrine: Negative  Hematologic/Lymphatic: Negative  Skin: Positive for rash (pruritic rash to face, neck and chest wall  )  Musculoskeletal: Negative  Gastrointestinal: Positive for nausea  Genitourinary: Negative  Neurological: Negative  Psychiatric/Behavioral: Negative  Allergic/Immunologic: Negative          Objective:   /92   Pulse 65   Ht 5' (1 524 m)   Wt 61 7 kg (136 lb)   LMP  (LMP Unknown)   BMI 26 56 kg/m² Physical Exam   Constitutional: She is oriented to person, place, and time  She appears well-developed and well-nourished  HENT:   Head: Normocephalic and atraumatic  Mouth/Throat: Oropharynx is clear and moist    Eyes: Conjunctivae and EOM are normal  No scleral icterus  Neck: Normal range of motion  Neck supple  No JVD present  No tracheal deviation present  Cardiovascular: Normal rate, regular rhythm, normal heart sounds and intact distal pulses  Exam reveals no gallop and no friction rub  No murmur heard  Pulmonary/Chest: Effort normal and breath sounds normal  No respiratory distress  She has no wheezes  She has no rales  She exhibits no tenderness  Abdominal: Soft  Bowel sounds are normal  She exhibits no distension  There is no tenderness  Aorta not palpable    Musculoskeletal: Normal range of motion  She exhibits no tenderness  Right shoulder: She exhibits swelling (bilateral thighs)  Thighs are warm palpation   Neurological: She is alert and oriented to person, place, and time  Skin: Skin is warm and dry  No rash noted  No erythema  No pallor  papillomacular rash face, neck and chest wall   Psychiatric: She has a normal mood and affect  Her behavior is normal    Nursing note and vitals reviewed        Lab Review:   No results found for: CHOL  No results found for: HDL  No results found for: LDLCALC  No results found for: TRIG  Results Reviewed     None        Results Reviewed     None        Results Reviewed     None          Recent Cardiovascular Testing:   Echo and nuclear stress test are ordered     ECG Review:   NSR

## 2020-01-20 ENCOUNTER — TELEPHONE (OUTPATIENT)
Dept: CARDIOLOGY CLINIC | Facility: CLINIC | Age: 65
End: 2020-01-20

## 2020-01-20 NOTE — TELEPHONE ENCOUNTER
Can you call the patient back and see what lab she went to and ask them to fax labs over  There is no results in the chart    Thank you

## 2020-01-20 NOTE — TELEPHONE ENCOUNTER
Patient had called wondering if you had received any lab results she had done on 1/16/20  She was seen by you on 1/14/20  Also stated she still feels bloated in the stomach area and about the same as when she was in to see you

## 2020-01-23 DIAGNOSIS — R60.9 EDEMA, UNSPECIFIED TYPE: Primary | ICD-10-CM

## 2020-01-23 RX ORDER — FUROSEMIDE 20 MG/1
20 TABLET ORAL DAILY
Qty: 90 TABLET | Refills: 3 | Status: SHIPPED | OUTPATIENT
Start: 2020-01-23 | End: 2020-02-18

## 2020-01-23 NOTE — TELEPHONE ENCOUNTER
Called Barrera Murphy to let her know her blood work results and she said she was still very swollen in her stomach area mostly and her legs still  She said she sees no difference  She asked if she should take a Lasix or wait to see what echo looked like and wait till next appt  ?

## 2020-01-24 DIAGNOSIS — R60.9 EDEMA, UNSPECIFIED TYPE: Primary | ICD-10-CM

## 2020-01-30 ENCOUNTER — HOSPITAL ENCOUNTER (OUTPATIENT)
Dept: NON INVASIVE DIAGNOSTICS | Facility: CLINIC | Age: 65
Discharge: HOME | End: 2020-01-30
Payer: MEDICARE

## 2020-01-30 DIAGNOSIS — R06.02 SOB (SHORTNESS OF BREATH): ICD-10-CM

## 2020-01-30 DIAGNOSIS — R07.9 CHEST PAIN, UNSPECIFIED TYPE: ICD-10-CM

## 2020-01-30 DIAGNOSIS — I10 ESSENTIAL HYPERTENSION: ICD-10-CM

## 2020-01-30 LAB
MAX DIASTOLIC BP: 106 MMHG
MAX HEART RATE: 136 BPM
MAX PREDICTED HEART RATE: 156 BPM
MAX. SYSTOLIC BP: 220 MMHG
PROTOCOL NAME: NORMAL
TARGET HR FORMULA: NORMAL
TIME IN EXERCISE PHASE: NORMAL

## 2020-01-30 PROCEDURE — 78452 HT MUSCLE IMAGE SPECT MULT: CPT | Performed by: INTERNAL MEDICINE

## 2020-01-30 PROCEDURE — 93017 CV STRESS TEST TRACING ONLY: CPT

## 2020-01-30 PROCEDURE — 93018 CV STRESS TEST I&R ONLY: CPT | Performed by: INTERNAL MEDICINE

## 2020-01-30 PROCEDURE — 78452 HT MUSCLE IMAGE SPECT MULT: CPT

## 2020-01-30 PROCEDURE — 93016 CV STRESS TEST SUPVJ ONLY: CPT | Performed by: INTERNAL MEDICINE

## 2020-01-30 PROCEDURE — A9502 TC99M TETROFOSMIN: HCPCS

## 2020-02-03 ENCOUNTER — HOSPITAL ENCOUNTER (OUTPATIENT)
Dept: MAMMOGRAPHY | Facility: CLINIC | Age: 65
Discharge: HOME | End: 2020-02-03
Payer: MEDICARE

## 2020-02-03 VITALS — HEIGHT: 60 IN | WEIGHT: 138 LBS | BODY MASS INDEX: 27.09 KG/M2

## 2020-02-03 DIAGNOSIS — Z12.31 VISIT FOR SCREENING MAMMOGRAM: ICD-10-CM

## 2020-02-03 PROCEDURE — 77067 SCR MAMMO BI INCL CAD: CPT

## 2020-02-03 PROCEDURE — 77063 BREAST TOMOSYNTHESIS BI: CPT

## 2020-02-04 ENCOUNTER — TELEPHONE (OUTPATIENT)
Dept: CARDIOLOGY CLINIC | Facility: CLINIC | Age: 65
End: 2020-02-04

## 2020-02-04 ENCOUNTER — HOSPITAL ENCOUNTER (OUTPATIENT)
Dept: NON INVASIVE DIAGNOSTICS | Facility: CLINIC | Age: 65
Discharge: HOME | End: 2020-02-04
Payer: MEDICARE

## 2020-02-04 DIAGNOSIS — R07.9 CHEST PAIN, UNSPECIFIED TYPE: ICD-10-CM

## 2020-02-04 DIAGNOSIS — R06.02 SOB (SHORTNESS OF BREATH): ICD-10-CM

## 2020-02-04 DIAGNOSIS — R53.83 OTHER FATIGUE: ICD-10-CM

## 2020-02-04 DIAGNOSIS — I10 BENIGN HYPERTENSION: Primary | ICD-10-CM

## 2020-02-04 DIAGNOSIS — I10 ESSENTIAL HYPERTENSION: ICD-10-CM

## 2020-02-04 DIAGNOSIS — R60.9 EDEMA, UNSPECIFIED TYPE: ICD-10-CM

## 2020-02-04 PROCEDURE — 93306 TTE W/DOPPLER COMPLETE: CPT | Performed by: INTERNAL MEDICINE

## 2020-02-04 PROCEDURE — 93306 TTE W/DOPPLER COMPLETE: CPT

## 2020-02-04 NOTE — TELEPHONE ENCOUNTER
Called patient to let her know her echo results were normal per Dr Eleanor Sotelo  Vascular studies normal as well  Patient is still concerned as her symptoms have not improved  In addition, she noted her BP to be very elevated at 206/96 post stress test and 158/106 pre study  She still complains of elevated BP, edema from calves up, and stomach distension  Please call her or advise changes or recommendations

## 2020-02-05 ENCOUNTER — TELEPHONE (OUTPATIENT)
Dept: CARDIOLOGY CLINIC | Facility: CLINIC | Age: 65
End: 2020-02-05

## 2020-02-05 NOTE — TELEPHONE ENCOUNTER
She will need renal US to check for ANDRAE  Can increase Hydralazine to 50 Bid  And increase Lasix to 40 daily    Check BMP in 5 days    Thank you

## 2020-02-05 NOTE — TELEPHONE ENCOUNTER
Called patient and informed her that her vascular results were normal per Anay Rosales  Patient states she still does not feel right and does not know what else to do

## 2020-02-06 NOTE — TELEPHONE ENCOUNTER
Spoke with patient  She will increase medication as instructed  I will place order for renal ultrasound and make appt to have done at [facility] at patient's request   Appt scheduled for 2/13/20  Scripts mailed to patient for all testing  In addition, patient requested a TSH to be checked, although, her last one was normal, and it was Bob with [facility]

## 2020-02-10 DIAGNOSIS — I10 ESSENTIAL HYPERTENSION: ICD-10-CM

## 2020-02-10 DIAGNOSIS — R60.9 EDEMA, UNSPECIFIED TYPE: ICD-10-CM

## 2020-02-10 RX ORDER — HYDRALAZINE HYDROCHLORIDE 50 MG/1
50 TABLET, FILM COATED ORAL 2 TIMES DAILY
Qty: 60 TABLET | Refills: 5 | Status: SHIPPED | OUTPATIENT
Start: 2020-02-10 | End: 2020-05-19 | Stop reason: SDUPTHER

## 2020-02-13 ENCOUNTER — HOSPITAL ENCOUNTER (OUTPATIENT)
Dept: VASCULAR ULTRASOUND | Facility: HOSPITAL | Age: 65
Discharge: HOME | End: 2020-02-13
Payer: MEDICARE

## 2020-02-13 DIAGNOSIS — I10 BENIGN HYPERTENSION: ICD-10-CM

## 2020-02-13 PROCEDURE — 93975 VASCULAR STUDY: CPT

## 2020-02-13 PROCEDURE — 93975 VASCULAR STUDY: CPT | Performed by: SURGERY

## 2020-02-16 DIAGNOSIS — R60.9 EDEMA, UNSPECIFIED TYPE: ICD-10-CM

## 2020-02-16 DIAGNOSIS — I10 ESSENTIAL HYPERTENSION: ICD-10-CM

## 2020-02-17 RX ORDER — HYDRALAZINE HYDROCHLORIDE 50 MG/1
25 TABLET, FILM COATED ORAL 2 TIMES DAILY
Qty: 60 TABLET | Refills: 3 | Status: SHIPPED | OUTPATIENT
Start: 2020-02-17 | End: 2020-02-18

## 2020-02-18 ENCOUNTER — OFFICE VISIT (OUTPATIENT)
Dept: CARDIOLOGY CLINIC | Facility: CLINIC | Age: 65
End: 2020-02-18
Payer: MEDICARE

## 2020-02-18 VITALS
SYSTOLIC BLOOD PRESSURE: 140 MMHG | DIASTOLIC BLOOD PRESSURE: 82 MMHG | HEIGHT: 60 IN | HEART RATE: 80 BPM | BODY MASS INDEX: 26.31 KG/M2 | WEIGHT: 134 LBS

## 2020-02-18 DIAGNOSIS — F41.9 ANXIETY: ICD-10-CM

## 2020-02-18 DIAGNOSIS — E78.2 MIXED HYPERLIPIDEMIA: Primary | ICD-10-CM

## 2020-02-18 DIAGNOSIS — R60.9 EDEMA, UNSPECIFIED TYPE: ICD-10-CM

## 2020-02-18 DIAGNOSIS — R00.1 BRADYCARDIA: ICD-10-CM

## 2020-02-18 DIAGNOSIS — R07.9 CHEST PAIN, UNSPECIFIED TYPE: ICD-10-CM

## 2020-02-18 PROCEDURE — 99214 OFFICE O/P EST MOD 30 MIN: CPT | Performed by: PHYSICIAN ASSISTANT

## 2020-02-18 RX ORDER — AMOXICILLIN 500 MG/1
CAPSULE ORAL
COMMUNITY
Start: 2020-02-17 | End: 2020-11-09 | Stop reason: ALTCHOICE

## 2020-02-18 RX ORDER — ALPRAZOLAM 0.25 MG/1
0.25 TABLET ORAL 3 TIMES DAILY PRN
Qty: 25 TABLET | Refills: 0 | Status: SHIPPED | OUTPATIENT
Start: 2020-02-18 | End: 2020-12-08 | Stop reason: SDUPTHER

## 2020-02-18 RX ORDER — OSELTAMIVIR PHOSPHATE 75 MG/1
CAPSULE ORAL
COMMUNITY
Start: 2020-02-17 | End: 2020-11-09 | Stop reason: ALTCHOICE

## 2020-02-18 RX ORDER — FUROSEMIDE 40 MG/1
40 TABLET ORAL DAILY
Qty: 60 TABLET | Refills: 3 | Status: SHIPPED | OUTPATIENT
Start: 2020-02-18 | End: 2020-05-19 | Stop reason: SDUPTHER

## 2020-02-18 NOTE — PROGRESS NOTES
[facility]   Outpatient Note  Cruz Hogue  1955  [id number]  [facility], [facility] [address]  [place] [address]  [facility] [address]    Cruz Hogue is a 59 y o  female    Assessment and Plan:   Mixed hyperlipidemia  LDL well within target range  Continue current statin therapy    Bradycardia  Much improved now off BB      Chest pain  Resolved with BP control  Stress test normal     Edema  Improving with diuretic therapy  She is to take an extra lasix with weight gain  Continue as tolerated     Hypertension  Controlled on current medical regimen    Additional Plan:   Medication changes as outlined above    No tests ordered today  Return visit will be in two months or earlier if there are problems  The patient is encouraged to call in the meantime if there are questions or concerns  Subjective: The patient is seen in follow up today  She has edema, HTN and a history of Chest Pain  Her tests are reviewed and are all normal  Her edema is improving and her BP is much improved  Chest pain has resolved now that BP is under control  From a cardiac perspective, she is doing well without complaints of chest pain or exertional dyspnea  She has no palpitations syncope or near syncope, and denies orthopnea or PND  She does not complain of TIA or CVA symptoms           Social History  Social History     Tobacco Use   Smoking Status Former Smoker    Last attempt to quit: Angel Coombs Years since quittin 1   Smokeless Tobacco Never Used   ,   Social History     Substance and Sexual Activity   Alcohol Use No   ,   Social History     Substance and Sexual Activity   Drug Use No     Family History   Problem Relation Age of Onset    No Known Problems Mother     No Known Problems Daughter     No Known Problems Maternal Grandmother     No Known Problems Paternal Grandmother     No Known Problems Paternal Aunt     No Known Problems Paternal Aunt     No Known Problems Paternal Aunt     No Known Problems Paternal Aunt     Breast cancer Neg Hx        Medical and Surgical History  Past Medical History:   Diagnosis Date    Anxiety     Arthritis     Bradycardia     Depression     Edema     Heart murmur     History of echocardiogram 2016    SEH-hypertensive hemodynamic response, LVH, echo portion is negative for ischemia   History of palpitations     Hyperlipidemia     Hypertension     Migraine     PVD (peripheral vascular disease) (HCC)     Shortness of breath      Past Surgical History:   Procedure Laterality Date     SECTION      COLONOSCOPY      SD ESOPHAGOGASTRODUODENOSCOPY TRANSORAL DIAGNOSTIC N/A 2017    Procedure: EGD AND COLONOSCOPY;  Surgeon: Megha Carbajal MD;  [facility]; Service: Gastroenterology    SD SHLDR ARTHROSCOP,SURG,W/ROTAT CUFF REPR Left 2017    Procedure: SHOULDER ARTHROSCOPY ROTATOR CUFF REPAIR; SUBACROMIAL DECOMPRESSION; BICEPS TENODESIS;  Surgeon: Flex Manriquez DO;  [facility];  Service: Orthopedics    TONSILLECTOMY           Current Outpatient Medications:     ALPRAZolam (XANAX) 0 25 mg tablet, Take 1 tablet (0 25 mg total) by mouth 3 (three) times a day as needed for anxiety Usually takes 1/2 tab, Disp: 25 tablet, Rfl: 0    amoxicillin (AMOXIL) 500 mg capsule, , Disp: , Rfl:     BOTOX 100 units, Inject 155 units intramuscularly every 90 days, Disp: 200 Units, Rfl: 2    butalbital-acetaminophen-caffeine (FIORICET,ESGIC) -40 mg per tablet, 1 tablet q6 hours prn migraine   No more than 2-3 per week, Disp: 15 tablet, Rfl: 2    furosemide (LASIX) 40 mg tablet, Take 1 tablet (40 mg total) by mouth daily, Disp: 60 tablet, Rfl: 3    hydrALAZINE (APRESOLINE) 50 mg tablet, Take 1 tablet (50 mg total) by mouth 2 (two) times a day, Disp: 60 tablet, Rfl: 5    oseltamivir (TAMIFLU) 75 mg capsule, , Disp: , Rfl:     pravastatin (PRAVACHOL) 20 mg tablet, Take 20 mg by mouth daily at bedtime  , Disp: , Rfl:   Allergies   Allergen Reactions    Eggs Or Egg-Derived Products Anaphylaxis    Iodine Anaphylaxis    Decadron [Dexamethasone] Nausea Only     Any steroid per pt   Advil [Ibuprofen] Swelling     Lips and eyes swelling    Naproxen Swelling     Lips and eyes       Review of Systems   Constitution: Negative  HENT: Negative  Eyes: Negative  Cardiovascular: Positive for leg swelling (thighs but not legs)  Negative for cyanosis and dyspnea on exertion  Respiratory: Negative for sleep disturbances due to breathing, snoring and wheezing  Endocrine: Negative  Hematologic/Lymphatic: Negative  Skin: Negative for rash (pruritic rash to face, neck and chest wall  )  Musculoskeletal: Negative  Genitourinary: Negative  Neurological: Negative  Psychiatric/Behavioral: Negative  Allergic/Immunologic: Negative  Objective:   /82   Pulse 80   Ht 5' (1 524 m)   Wt 60 8 kg (134 lb)   LMP  (LMP Unknown)   BMI 26 17 kg/m²   Physical Exam   Constitutional: She is oriented to person, place, and time  She appears well-developed and well-nourished  HENT:   Head: Normocephalic and atraumatic  Mouth/Throat: Oropharynx is clear and moist    Eyes: Conjunctivae and EOM are normal  No scleral icterus  Neck: Normal range of motion  Neck supple  No JVD present  No tracheal deviation present  Cardiovascular: Normal rate, regular rhythm, normal heart sounds and intact distal pulses  Exam reveals no gallop and no friction rub  No murmur heard  Pulmonary/Chest: Effort normal and breath sounds normal  No respiratory distress  She has no wheezes  She has no rales  She exhibits no tenderness  Abdominal: Soft  Bowel sounds are normal  She exhibits no distension  There is no tenderness  Aorta not palpable    Musculoskeletal: Normal range of motion  She exhibits edema  She exhibits no tenderness          Right shoulder: She exhibits swelling (bilateral thighs)  Thighs are warm palpation   Neurological: She is alert and oriented to person, place, and time  Skin: Skin is warm and dry  No rash noted  No erythema  No pallor  papillomacular rash face, neck and chest wall   Psychiatric: She has a normal mood and affect  Her behavior is normal    Nursing note and vitals reviewed  Lab Review:   No results found for: CHOL  No results found for: HDL  No results found for: LDLCALC  No results found for: TRIG  Results Reviewed     None        Results Reviewed     None        Results Reviewed     None          Recent Cardiovascular Testing:   Echo [id number]: Systolic function was normal  Ejection fraction was estimated to be 60 %  There were no regional wall motion abnormalities  There was mild concentric hypertrophy      Nuclear stress: 1-: Normal study EF 79%    Vascular studies : Normal Bilateral LE      ECG Review:   NSR

## 2020-02-18 NOTE — ASSESSMENT & PLAN NOTE
Improving with diuretic therapy  She is to take an extra lasix with weight gain     Continue as tolerated

## 2020-05-19 ENCOUNTER — OFFICE VISIT (OUTPATIENT)
Dept: CARDIOLOGY CLINIC | Facility: CLINIC | Age: 65
End: 2020-05-19
Payer: MEDICARE

## 2020-05-19 VITALS
BODY MASS INDEX: 25.32 KG/M2 | DIASTOLIC BLOOD PRESSURE: 82 MMHG | WEIGHT: 129 LBS | HEART RATE: 68 BPM | HEIGHT: 60 IN | SYSTOLIC BLOOD PRESSURE: 132 MMHG

## 2020-05-19 DIAGNOSIS — E78.2 MIXED HYPERLIPIDEMIA: Primary | ICD-10-CM

## 2020-05-19 DIAGNOSIS — R60.9 EDEMA, UNSPECIFIED TYPE: ICD-10-CM

## 2020-05-19 DIAGNOSIS — H93.13 TINNITUS OF BOTH EARS: ICD-10-CM

## 2020-05-19 DIAGNOSIS — I10 ESSENTIAL HYPERTENSION: ICD-10-CM

## 2020-05-19 PROBLEM — H93.19 TINNITUS: Status: ACTIVE | Noted: 2020-05-19

## 2020-05-19 PROCEDURE — 99214 OFFICE O/P EST MOD 30 MIN: CPT | Performed by: PHYSICIAN ASSISTANT

## 2020-05-19 RX ORDER — PRAVASTATIN SODIUM 20 MG
20 TABLET ORAL
Qty: 90 TABLET | Refills: 3 | Status: SHIPPED | OUTPATIENT
Start: 2020-05-19 | End: 2020-12-08

## 2020-05-19 RX ORDER — HYDRALAZINE HYDROCHLORIDE 50 MG/1
50 TABLET, FILM COATED ORAL 2 TIMES DAILY
Qty: 60 TABLET | Refills: 5 | Status: SHIPPED | OUTPATIENT
Start: 2020-05-19 | End: 2021-01-05 | Stop reason: SDUPTHER

## 2020-05-19 RX ORDER — FUROSEMIDE 40 MG/1
40 TABLET ORAL DAILY
Qty: 60 TABLET | Refills: 3 | Status: SHIPPED | OUTPATIENT
Start: 2020-05-19 | End: 2021-01-05

## 2020-10-30 ENCOUNTER — TELEPHONE (OUTPATIENT)
Dept: NEUROLOGY | Facility: CLINIC | Age: 65
End: 2020-10-30

## 2020-10-30 ENCOUNTER — TELEPHONE (OUTPATIENT)
Dept: CARDIOLOGY CLINIC | Facility: CLINIC | Age: 65
End: 2020-10-30

## 2020-10-30 DIAGNOSIS — I10 ESSENTIAL HYPERTENSION: ICD-10-CM

## 2020-10-30 DIAGNOSIS — R06.02 SOB (SHORTNESS OF BREATH): ICD-10-CM

## 2020-10-30 DIAGNOSIS — R00.1 BRADYCARDIA: Primary | ICD-10-CM

## 2020-10-30 DIAGNOSIS — R60.9 EDEMA, UNSPECIFIED TYPE: ICD-10-CM

## 2020-10-30 DIAGNOSIS — E78.2 MIXED HYPERLIPIDEMIA: ICD-10-CM

## 2020-10-30 DIAGNOSIS — I10 BENIGN HYPERTENSION: ICD-10-CM

## 2020-10-30 DIAGNOSIS — R53.83 OTHER FATIGUE: ICD-10-CM

## 2020-10-30 DIAGNOSIS — D50.9 IRON DEFICIENCY ANEMIA, UNSPECIFIED IRON DEFICIENCY ANEMIA TYPE: ICD-10-CM

## 2020-11-09 ENCOUNTER — TELEPHONE (OUTPATIENT)
Dept: NEUROLOGY | Facility: CLINIC | Age: 65
End: 2020-11-09

## 2020-11-09 ENCOUNTER — CONSULT (OUTPATIENT)
Dept: NEUROLOGY | Facility: CLINIC | Age: 65
End: 2020-11-09
Payer: MEDICARE

## 2020-11-09 VITALS
WEIGHT: 133 LBS | HEIGHT: 60 IN | DIASTOLIC BLOOD PRESSURE: 80 MMHG | SYSTOLIC BLOOD PRESSURE: 130 MMHG | BODY MASS INDEX: 26.11 KG/M2 | HEART RATE: 80 BPM

## 2020-11-09 DIAGNOSIS — G43.709 CHRONIC MIGRAINE WITHOUT AURA WITHOUT STATUS MIGRAINOSUS, NOT INTRACTABLE: Primary | ICD-10-CM

## 2020-11-09 PROCEDURE — 99215 OFFICE O/P EST HI 40 MIN: CPT | Performed by: PSYCHIATRY & NEUROLOGY

## 2020-11-09 RX ORDER — ONDANSETRON 8 MG/1
TABLET, ORALLY DISINTEGRATING ORAL
COMMUNITY
Start: 2020-10-23 | End: 2021-07-15 | Stop reason: ALTCHOICE

## 2020-11-10 ENCOUNTER — DOCUMENTATION (OUTPATIENT)
Dept: NEUROLOGY | Facility: CLINIC | Age: 65
End: 2020-11-10

## 2020-11-30 ENCOUNTER — ANNUAL EXAM (OUTPATIENT)
Dept: OBGYN CLINIC | Facility: CLINIC | Age: 65
End: 2020-11-30
Payer: MEDICARE

## 2020-11-30 VITALS — WEIGHT: 133 LBS | BODY MASS INDEX: 25.76 KG/M2 | SYSTOLIC BLOOD PRESSURE: 118 MMHG | DIASTOLIC BLOOD PRESSURE: 80 MMHG

## 2020-11-30 DIAGNOSIS — Z12.31 VISIT FOR SCREENING MAMMOGRAM: Primary | ICD-10-CM

## 2020-11-30 DIAGNOSIS — Z78.0 ASYMPTOMATIC AGE-RELATED POSTMENOPAUSAL STATE: ICD-10-CM

## 2020-11-30 DIAGNOSIS — Z12.31 SCREENING MAMMOGRAM, ENCOUNTER FOR: ICD-10-CM

## 2020-11-30 DIAGNOSIS — R39.9 UTI SYMPTOMS: ICD-10-CM

## 2020-11-30 DIAGNOSIS — Z01.419 ENCOUNTER FOR GYNECOLOGICAL EXAMINATION WITHOUT ABNORMAL FINDING: ICD-10-CM

## 2020-11-30 PROCEDURE — 87086 URINE CULTURE/COLONY COUNT: CPT | Performed by: OBSTETRICS & GYNECOLOGY

## 2020-11-30 PROCEDURE — G0101 CA SCREEN;PELVIC/BREAST EXAM: HCPCS | Performed by: OBSTETRICS & GYNECOLOGY

## 2020-12-02 LAB — BACTERIA UR CULT: NORMAL

## 2020-12-04 ENCOUNTER — LAB (OUTPATIENT)
Dept: LAB | Facility: CLINIC | Age: 65
End: 2020-12-04
Payer: MEDICARE

## 2020-12-04 DIAGNOSIS — R06.02 SOB (SHORTNESS OF BREATH): ICD-10-CM

## 2020-12-04 DIAGNOSIS — R07.9 CHEST PAIN, UNSPECIFIED TYPE: ICD-10-CM

## 2020-12-04 DIAGNOSIS — R60.9 EDEMA, UNSPECIFIED TYPE: ICD-10-CM

## 2020-12-04 LAB
ANION GAP SERPL CALCULATED.3IONS-SCNC: 7 MMOL/L (ref 4–13)
BASOPHILS # BLD AUTO: 0.06 THOUSANDS/ΜL (ref 0–0.1)
BASOPHILS NFR BLD AUTO: 1 % (ref 0–1)
BUN SERPL-MCNC: 18 MG/DL (ref 5–25)
CALCIUM SERPL-MCNC: 8.9 MG/DL (ref 8.3–10.1)
CHLORIDE SERPL-SCNC: 103 MMOL/L (ref 100–108)
CHOLEST SERPL-MCNC: 229 MG/DL (ref 50–200)
CO2 SERPL-SCNC: 28 MMOL/L (ref 21–32)
CREAT SERPL-MCNC: 0.76 MG/DL (ref 0.6–1.3)
EOSINOPHIL # BLD AUTO: 0.16 THOUSAND/ΜL (ref 0–0.61)
EOSINOPHIL NFR BLD AUTO: 3 % (ref 0–6)
ERYTHROCYTE [DISTWIDTH] IN BLOOD BY AUTOMATED COUNT: 13 % (ref 11.6–15.1)
GFR SERPL CREATININE-BSD FRML MDRD: 83 ML/MIN/1.73SQ M
GLUCOSE P FAST SERPL-MCNC: 90 MG/DL (ref 65–99)
HCT VFR BLD AUTO: 42.3 % (ref 34.8–46.1)
HDLC SERPL-MCNC: 86 MG/DL
HGB BLD-MCNC: 13.1 G/DL (ref 11.5–15.4)
IMM GRANULOCYTES # BLD AUTO: 0.02 THOUSAND/UL (ref 0–0.2)
IMM GRANULOCYTES NFR BLD AUTO: 0 % (ref 0–2)
LDLC SERPL CALC-MCNC: 131 MG/DL (ref 0–100)
LYMPHOCYTES # BLD AUTO: 1.52 THOUSANDS/ΜL (ref 0.6–4.47)
LYMPHOCYTES NFR BLD AUTO: 28 % (ref 14–44)
MCH RBC QN AUTO: 27.4 PG (ref 26.8–34.3)
MCHC RBC AUTO-ENTMCNC: 31 G/DL (ref 31.4–37.4)
MCV RBC AUTO: 89 FL (ref 82–98)
MONOCYTES # BLD AUTO: 0.37 THOUSAND/ΜL (ref 0.17–1.22)
MONOCYTES NFR BLD AUTO: 7 % (ref 4–12)
NEUTROPHILS # BLD AUTO: 3.31 THOUSANDS/ΜL (ref 1.85–7.62)
NEUTS SEG NFR BLD AUTO: 61 % (ref 43–75)
NONHDLC SERPL-MCNC: 143 MG/DL
NRBC BLD AUTO-RTO: 0 /100 WBCS
NT-PROBNP SERPL-MCNC: 170 PG/ML
PLATELET # BLD AUTO: 268 THOUSANDS/UL (ref 149–390)
PMV BLD AUTO: 10.5 FL (ref 8.9–12.7)
POTASSIUM SERPL-SCNC: 3.5 MMOL/L (ref 3.5–5.3)
RBC # BLD AUTO: 4.78 MILLION/UL (ref 3.81–5.12)
SODIUM SERPL-SCNC: 138 MMOL/L (ref 136–145)
TRIGL SERPL-MCNC: 62 MG/DL
TSH SERPL DL<=0.05 MIU/L-ACNC: 3.45 UIU/ML (ref 0.36–3.74)
WBC # BLD AUTO: 5.44 THOUSAND/UL (ref 4.31–10.16)

## 2020-12-04 PROCEDURE — 85025 COMPLETE CBC W/AUTO DIFF WBC: CPT

## 2020-12-04 PROCEDURE — 83880 ASSAY OF NATRIURETIC PEPTIDE: CPT

## 2020-12-04 PROCEDURE — 84443 ASSAY THYROID STIM HORMONE: CPT

## 2020-12-04 PROCEDURE — 80061 LIPID PANEL: CPT

## 2020-12-04 PROCEDURE — 36415 COLL VENOUS BLD VENIPUNCTURE: CPT

## 2020-12-04 PROCEDURE — 80048 BASIC METABOLIC PNL TOTAL CA: CPT

## 2020-12-08 ENCOUNTER — OFFICE VISIT (OUTPATIENT)
Dept: CARDIOLOGY CLINIC | Facility: CLINIC | Age: 65
End: 2020-12-08
Payer: MEDICARE

## 2020-12-08 VITALS
HEART RATE: 65 BPM | WEIGHT: 128 LBS | BODY MASS INDEX: 25.13 KG/M2 | DIASTOLIC BLOOD PRESSURE: 80 MMHG | HEIGHT: 60 IN | SYSTOLIC BLOOD PRESSURE: 124 MMHG

## 2020-12-08 DIAGNOSIS — E78.2 MIXED HYPERLIPIDEMIA: ICD-10-CM

## 2020-12-08 DIAGNOSIS — R00.1 BRADYCARDIA: ICD-10-CM

## 2020-12-08 DIAGNOSIS — I10 ESSENTIAL HYPERTENSION: Primary | ICD-10-CM

## 2020-12-08 DIAGNOSIS — F41.9 ANXIETY: ICD-10-CM

## 2020-12-08 PROCEDURE — 99214 OFFICE O/P EST MOD 30 MIN: CPT | Performed by: PHYSICIAN ASSISTANT

## 2020-12-08 PROCEDURE — 93000 ELECTROCARDIOGRAM COMPLETE: CPT | Performed by: PHYSICIAN ASSISTANT

## 2020-12-08 RX ORDER — PRAVASTATIN SODIUM 40 MG
20 TABLET ORAL
Qty: 30 TABLET | Refills: 3
Start: 2020-12-08 | End: 2021-01-13 | Stop reason: ALTCHOICE

## 2020-12-08 RX ORDER — ALPRAZOLAM 0.25 MG/1
0.25 TABLET ORAL 3 TIMES DAILY PRN
Qty: 25 TABLET | Refills: 0 | Status: SHIPPED | OUTPATIENT
Start: 2020-12-08 | End: 2021-07-15 | Stop reason: SDUPTHER

## 2020-12-15 ENCOUNTER — TELEPHONE (OUTPATIENT)
Dept: CARDIOLOGY CLINIC | Facility: CLINIC | Age: 65
End: 2020-12-15

## 2020-12-16 NOTE — TELEPHONE ENCOUNTER
Physical Therapy Treatment Note     [id number]    Therapy Diagnosis:   No diagnosis found.  Physician: Papo Ochoa MD    Visit Date: 12/15/2020    Physician Orders: PT Eval and Treat   Medical Diagnosis from Referral: Dislocation of right hip  Evaluation Date: 11/30/2020  Authorization Period Expiration: 11/16/2021  Plan of Care Expiration: 12/29/2020  Visit # / Visits authorized:  3/20    Time In: 1:15 pm  Time Out: 2:00 pm  Total Billable Time: 45 minutes    Precautions: Fall risk, Previous right hip dislocation, and Parkinson's    Subjective     Pt reports: her right thigh hurts  She was compliant with home exercise program.  Response to previous treatment: N/A  Functional change: None at this time    Pain: 5/10   Location: right lateral thigh and knee      Objective     Tanya received therapeutic exercises to develop strength and ROM for 35 minutes including:  Heel slides assisted right  Quad sets with cues  iso hip adduction with ball  Hip abduction with yellow band  Bridges (low)  LAQ  Seated march  Sit to stand from wheelchair    Tanya participated in dynamic functional therapeutic activities to improve functional performance for 10 minutes, including:  Standing weight shifts  Gait in parallel bars  Backwards stepping    Tanya participated in gait training to improve functional mobility and safety for 0  minutes, including:      Home Exercises Provided and Patient Education Provided     Education provided:   - Review of home program    Written Home Exercises Provided: Patient instructed to cont prior HEP.  Exercises were reviewed and Tanya was able to demonstrate them prior to the end of the session.  Tanya demonstrated good  understanding of the education provided.     See EMR under Patient Instructions for exercises provided prior visit.    Assessment     The patient is difficult to hear as she speaks with a very low voice which is reported to be prior  Botox has just been received and is warm  to Parkinson's dx..  She is slow with movement and requires cues and support in gait activity.  She has anxiety with standing.   She requires assistance in performance of exercises with right LE and reports pain laterally in right thigh  Tanya is progressing well towards her goals.   Pt prognosis is Good.     Pt will continue to benefit from skilled outpatient physical therapy to address the deficits listed in the problem list box on initial evaluation, provide pt/family education and to maximize pt's level of independence in the home and community environment.     Pt's spiritual, cultural and educational needs considered and pt agreeable to plan of care and goals.     Anticipated barriers to physical therapy: Parkinson's    Goals: Short Term Goals: In 4 weeks:  1.I with HEP  2.Patient to demo increased AROM /PROM fromn-10 to -5 knee extension (B)  3.Patient to demo increase LE strength from 4-/5 to 4/5  4.Patient to have decreased pain to 4/10.  5.Patient to ambulate with  feet with min assist  6.Pt will improve FOTO disability score to 65% disability or less in order to improve overall QOL and return to PLOF.       Long Term Goals: In 8 weeks  1. Patient to perform daily activities including walking with device without assistance  2. Patient to demonstrate increased knee AROM/PROM to 125 degrees flexion (R)  3.  Patient to demonstrate increased knee AROM/PROM to 0 degrees extension (B)  3. Patient to demonstrate increased LE strength to 4+/5  4. Patient to have decreased pain to 2/10.  5. Pt will improve FOTO disability score to 55% disability or less in order to improve overall QOL and return to PLOF.      Plan     Outpatient Physical Therapy 2 times weekly for 8 weeks to include the following interventions: Electrical Stimulation as needed, Gait Training, Manual Therapy, Moist Heat/ Ice, Neuromuscular Re-ed, Patient Education, Therapeutic Activites and Therapeutic Exercise.     Abhijit Rodriguez, PT

## 2020-12-18 ENCOUNTER — PROCEDURE VISIT (OUTPATIENT)
Dept: NEUROLOGY | Facility: CLINIC | Age: 65
End: 2020-12-18
Payer: MEDICARE

## 2020-12-18 VITALS — TEMPERATURE: 97.2 F | SYSTOLIC BLOOD PRESSURE: 142 MMHG | DIASTOLIC BLOOD PRESSURE: 80 MMHG

## 2020-12-18 DIAGNOSIS — G43.719 INTRACTABLE CHRONIC MIGRAINE WITHOUT AURA AND WITHOUT STATUS MIGRAINOSUS: Primary | ICD-10-CM

## 2020-12-18 PROCEDURE — 64615 CHEMODENERV MUSC MIGRAINE: CPT | Performed by: PSYCHIATRY & NEUROLOGY

## 2020-12-18 RX ORDER — SUMATRIPTAN 100 MG/1
TABLET, FILM COATED ORAL
Qty: 9 TABLET | Refills: 5 | Status: SHIPPED | OUTPATIENT
Start: 2020-12-18 | End: 2021-01-20

## 2021-01-05 DIAGNOSIS — R60.9 EDEMA, UNSPECIFIED TYPE: ICD-10-CM

## 2021-01-05 DIAGNOSIS — I10 ESSENTIAL HYPERTENSION: ICD-10-CM

## 2021-01-05 RX ORDER — FUROSEMIDE 40 MG/1
TABLET ORAL
Qty: 90 TABLET | Refills: 2 | Status: SHIPPED | OUTPATIENT
Start: 2021-01-05 | End: 2021-04-14 | Stop reason: SDUPTHER

## 2021-01-06 RX ORDER — HYDRALAZINE HYDROCHLORIDE 50 MG/1
50 TABLET, FILM COATED ORAL 2 TIMES DAILY
Qty: 60 TABLET | Refills: 5 | Status: SHIPPED | OUTPATIENT
Start: 2021-01-06 | End: 2021-04-14 | Stop reason: SDUPTHER

## 2021-01-13 ENCOUNTER — OFFICE VISIT (OUTPATIENT)
Dept: FAMILY MEDICINE CLINIC | Facility: CLINIC | Age: 66
End: 2021-01-13
Payer: MEDICARE

## 2021-01-13 VITALS
WEIGHT: 134 LBS | TEMPERATURE: 97 F | OXYGEN SATURATION: 97 % | HEART RATE: 66 BPM | BODY MASS INDEX: 26.31 KG/M2 | HEIGHT: 60 IN | SYSTOLIC BLOOD PRESSURE: 130 MMHG | RESPIRATION RATE: 16 BRPM | DIASTOLIC BLOOD PRESSURE: 74 MMHG

## 2021-01-13 DIAGNOSIS — G43.109 MIGRAINE WITH AURA AND WITHOUT STATUS MIGRAINOSUS, NOT INTRACTABLE: ICD-10-CM

## 2021-01-13 DIAGNOSIS — H93.11 TINNITUS OF RIGHT EAR: ICD-10-CM

## 2021-01-13 DIAGNOSIS — Z00.00 MEDICARE ANNUAL WELLNESS VISIT, INITIAL: Primary | ICD-10-CM

## 2021-01-13 DIAGNOSIS — I10 ESSENTIAL HYPERTENSION: ICD-10-CM

## 2021-01-13 DIAGNOSIS — E78.2 MIXED HYPERLIPIDEMIA: ICD-10-CM

## 2021-01-13 PROBLEM — M77.01 MEDIAL EPICONDYLITIS OF ELBOW, RIGHT: Status: RESOLVED | Noted: 2018-10-25 | Resolved: 2021-01-13

## 2021-01-13 PROBLEM — R60.9 EDEMA: Status: RESOLVED | Noted: 2020-01-14 | Resolved: 2021-01-13

## 2021-01-13 PROBLEM — R29.898 RIGHT HAND WEAKNESS: Status: RESOLVED | Noted: 2018-10-25 | Resolved: 2021-01-13

## 2021-01-13 PROBLEM — M77.8 TENDONITIS OF WRIST, RIGHT: Status: RESOLVED | Noted: 2018-10-25 | Resolved: 2021-01-13

## 2021-01-13 PROBLEM — D50.9 IRON DEFICIENCY ANEMIA: Status: RESOLVED | Noted: 2017-04-27 | Resolved: 2021-01-13

## 2021-01-13 PROBLEM — R74.01 ELEVATED TRANSAMINASE LEVEL: Status: RESOLVED | Noted: 2017-04-27 | Resolved: 2021-01-13

## 2021-01-13 PROBLEM — Z01.419 ENCOUNTER FOR GYNECOLOGICAL EXAMINATION WITHOUT ABNORMAL FINDING: Status: RESOLVED | Noted: 2018-11-27 | Resolved: 2021-01-13

## 2021-01-13 PROBLEM — S46.212A RUPTURE OF LEFT BICEPS TENDON: Status: RESOLVED | Noted: 2017-08-29 | Resolved: 2021-01-13

## 2021-01-13 PROBLEM — R06.02 SOB (SHORTNESS OF BREATH): Status: RESOLVED | Noted: 2020-01-14 | Resolved: 2021-01-13

## 2021-01-13 PROBLEM — R07.9 CHEST PAIN: Status: RESOLVED | Noted: 2020-01-14 | Resolved: 2021-01-13

## 2021-01-13 PROCEDURE — 1123F ACP DISCUSS/DSCN MKR DOCD: CPT | Performed by: FAMILY MEDICINE

## 2021-01-13 PROCEDURE — G0438 PPPS, INITIAL VISIT: HCPCS | Performed by: FAMILY MEDICINE

## 2021-01-13 PROCEDURE — 99204 OFFICE O/P NEW MOD 45 MIN: CPT | Performed by: FAMILY MEDICINE

## 2021-01-13 NOTE — PROGRESS NOTES
Assessment/Plan:       Problem List Items Addressed This Visit        Cardiovascular and Mediastinum    Migraine with aura and without status migrainosus, not intractable    Hypertension       Other    Mixed hyperlipidemia    Relevant Orders    Lipid Panel with Direct LDL reflex    Tinnitus    Relevant Orders    Ambulatory Referral to Otolaryngology      Other Visit Diagnoses     Medicare annual wellness visit, initial    -  Primary            Subjective:      Patient ID: Jennifer Live is a 72 y o  female  72year old here to establish care  History of hyperlipidemia - was on Pravastatin but says her legs "blew up" and so she was told to stop it by her cardiologist   Her last lipid panel was done in December  Total cholesterol 229,   She has a cardiologist because her family had aneursym  History of hypertension-she is on hydralazine and furosemide  She follows up Cardiology  She denies any leg swelling, chest pain or shortness of breath  Her blood pressure is fairly well controlled  History of migraine-she is on Botox injections with Neurology  History of anxiety-she is taking Xanax as needed -gets it from her cardiologist     She complains of ringing in the right ear  She says this has been going on for while  She saw ENT this summer, I do not see any reports in the chart  She is unsure which physician she saw  Would like to see a different ENT  The following portions of the patient's history were reviewed and updated as appropriate:   She  has a past medical history of Anxiety, Arthritis, Bradycardia, Depression, Edema, Head injury, Heart murmur, History of echocardiogram (07/18/2016), History of palpitations, Hyperlipidemia, Hypertension, Migraine, PVD (peripheral vascular disease) ([facility] [address] ), and Shortness of breath    She   Patient Active Problem List    Diagnosis Date Noted    Tinnitus 05/19/2020    Bradycardia 01/14/2020    Mixed hyperlipidemia 12/03/2018    Numbness and tingling in right hand 10/25/2018    Carpal tunnel syndrome on left 10/25/2018    Migraine with aura and without status migrainosus, not intractable 2018    Chronic left shoulder pain 2018    Myofascial pain syndrome 2014    Anxiety 2014    Arthritis 2014    Hypertension 2014    Postconcussion syndrome 2014     She  has a past surgical history that includes  section; Colonoscopy; Tonsillectomy; pr esophagogastroduodenoscopy transoral diagnostic (N/A, 2017); and pr shldr arthroscop,surg,w/rotat cuff repr (Left, 2017)  Her family history includes Aneurysm in her father; Aortic aneurysm in her mother; Heart attack in her maternal uncle; Heart disease in her father, maternal uncle, maternal uncle, maternal uncle, maternal uncle, maternal uncle, and mother; Hypertension in her father; Migraines in her mother; Multiple sclerosis in her brother; No Known Problems in her daughter, maternal grandmother, paternal aunt, paternal aunt, paternal aunt, paternal aunt, and paternal grandmother  She  reports that she quit smoking about 31 years ago  She has never used smokeless tobacco  She reports that she does not drink alcohol or use drugs  Current Outpatient Medications   Medication Sig Dispense Refill    ALPRAZolam (XANAX) 0 25 mg tablet Take 1 tablet (0 25 mg total) by mouth 3 (three) times a day as needed for anxiety Usually takes 1/2 tab 25 tablet 0    BOTOX 100 units Inject 155 units intramuscularly every 90 days 200 Units 2    furosemide (LASIX) 40 mg tablet TAKE 1 TABLET BY MOUTH EVERY DAY 90 tablet 2    hydrALAZINE (APRESOLINE) 50 mg tablet Take 1 tablet (50 mg total) by mouth 2 (two) times a day 60 tablet 5    ondansetron (ZOFRAN-ODT) 8 mg disintegrating tablet TAKE ONE (1) TABLET BY MOUTH EVERY 6 HOURS AS NEEDED      SUMAtriptan (IMITREX) 100 mg tablet One p o  P r n  Headache, may repeat 1 after 2 hours p r n   Maximum 2/24 hours 9 tablet 5 No current facility-administered medications for this visit  Current Outpatient Medications on File Prior to Visit   Medication Sig    ALPRAZolam (XANAX) 0 25 mg tablet Take 1 tablet (0 25 mg total) by mouth 3 (three) times a day as needed for anxiety Usually takes 1/2 tab    BOTOX 100 units Inject 155 units intramuscularly every 90 days    furosemide (LASIX) 40 mg tablet TAKE 1 TABLET BY MOUTH EVERY DAY    hydrALAZINE (APRESOLINE) 50 mg tablet Take 1 tablet (50 mg total) by mouth 2 (two) times a day    ondansetron (ZOFRAN-ODT) 8 mg disintegrating tablet TAKE ONE (1) TABLET BY MOUTH EVERY 6 HOURS AS NEEDED    SUMAtriptan (IMITREX) 100 mg tablet One p o  P r n  Headache, may repeat 1 after 2 hours p r n  Maximum 2/24 hours    [DISCONTINUED] butalbital-acetaminophen-caffeine (FIORICET,ESGIC) -40 mg per tablet 1 tablet q6 hours prn migraine  No more than 2-3 per week (Patient not taking: Reported on 1/13/2021)    [DISCONTINUED] pravastatin (PRAVACHOL) 40 mg tablet Take 0 5 tablets (20 mg total) by mouth daily at bedtime (Patient not taking: Reported on 1/13/2021)     No current facility-administered medications on file prior to visit  She is allergic to eggs or egg-derived products; flu virus vaccine; iodine; decadron [dexamethasone]; advil [ibuprofen]; and naproxen       Review of Systems   Constitutional: Negative for activity change, appetite change, chills, fatigue, fever and unexpected weight change  HENT: Positive for tinnitus  Negative for congestion, ear discharge, ear pain, postnasal drip, sinus pressure and sore throat  Eyes: Negative for discharge and visual disturbance  Respiratory: Negative for cough, shortness of breath and wheezing  Cardiovascular: Negative for chest pain, palpitations and leg swelling  Gastrointestinal: Negative for abdominal pain, constipation, diarrhea, nausea and vomiting     Endocrine: Negative for cold intolerance, heat intolerance, polydipsia and polyuria  Genitourinary: Negative for difficulty urinating and frequency  Musculoskeletal: Negative for arthralgias, back pain, joint swelling and myalgias  Skin: Negative for rash  Neurological: Positive for headaches  Negative for dizziness, weakness, light-headedness and numbness  Hematological: Negative for adenopathy  Psychiatric/Behavioral: Negative for behavioral problems, confusion, dysphoric mood, sleep disturbance and suicidal ideas  The patient is nervous/anxious  Objective:      /74 (BP Location: Left arm, Patient Position: Sitting, Cuff Size: Adult)   Pulse 66   Temp (!) 97 °F (36 1 °C)   Resp 16   Ht 5' 0 25" (1 53 m)   Wt 60 8 kg (134 lb)   LMP  (LMP Unknown)   SpO2 97%   BMI 25 95 kg/m²          Physical Exam  Vitals signs and nursing note reviewed  Constitutional:       General: She is not in acute distress  Appearance: She is well-developed  She is not diaphoretic  HENT:      Head: Normocephalic and atraumatic  Right Ear: Hearing, tympanic membrane, ear canal and external ear normal       Left Ear: Hearing, tympanic membrane, ear canal and external ear normal       Nose: Nose normal       Mouth/Throat:      Pharynx: No oropharyngeal exudate  Eyes:      Conjunctiva/sclera: Conjunctivae normal       Pupils: Pupils are equal, round, and reactive to light  Neck:      Musculoskeletal: Neck supple  Thyroid: No thyromegaly  Cardiovascular:      Rate and Rhythm: Normal rate and regular rhythm  Heart sounds: Normal heart sounds  No murmur  No friction rub  No gallop  Pulmonary:      Effort: Pulmonary effort is normal  No respiratory distress  Breath sounds: Normal breath sounds  No wheezing or rales  Chest:      Chest wall: No tenderness  Abdominal:      General: Bowel sounds are normal  There is no distension  Palpations: Abdomen is soft  There is no mass  Tenderness: There is no abdominal tenderness   There is no guarding or rebound  Musculoskeletal: Normal range of motion  Lymphadenopathy:      Cervical: No cervical adenopathy  Skin:     General: Skin is warm and dry  Findings: No rash  Neurological:      General: No focal deficit present  Mental Status: She is alert and oriented to person, place, and time  Mental status is at baseline  Cranial Nerves: No cranial nerve deficit  Psychiatric:         Mood and Affect: Mood normal          Behavior: Behavior normal          Thought Content:  Thought content normal          Judgment: Judgment normal

## 2021-01-13 NOTE — PATIENT INSTRUCTIONS
Medicare Preventive Visit Patient Instructions  Thank you for completing your Welcome to Medicare Visit or Medicare Annual Wellness Visit today  Your next wellness visit will be due in one year (1/13/2022)  The screening/preventive services that you may require over the next 5-10 years are detailed below  Some tests may not apply to you based off risk factors and/or age  Screening tests ordered at today's visit but not completed yet may show as past due  Also, please note that scanned in results may not display below  Preventive Screenings:  Service Recommendations Previous Testing/Comments   Colorectal Cancer Screening  * Colonoscopy    * Fecal Occult Blood Test (FOBT)/Fecal Immunochemical Test (FIT)  * Fecal DNA/Cologuard Test  * Flexible Sigmoidoscopy Age: 54-65 years old   Colonoscopy: every 10 years (may be performed more frequently if at higher risk)  OR  FOBT/FIT: every 1 year  OR  Cologuard: every 3 years  OR  Sigmoidoscopy: every 5 years  Screening may be recommended earlier than age 48 if at higher risk for colorectal cancer  Also, an individualized decision between you and your healthcare provider will decide whether screening between the ages of 74-80 would be appropriate  Colonoscopy: 05/11/2017  FOBT/FIT: Not on file  Cologuard: Not on file  Sigmoidoscopy: Not on file    Screening Current     Breast Cancer Screening Age: 36 years old  Frequency: every 1-2 years  Not required if history of left and right mastectomy Mammogram: 02/03/2020    Screening Current   Cervical Cancer Screening Between the ages of 21-29, pap smear recommended once every 3 years  Between the ages of 33-67, can perform pap smear with HPV co-testing every 5 years     Recommendations may differ for women with a history of total hysterectomy, cervical cancer, or abnormal pap smears in past  Pap Smear: 11/30/2020    Screening Not Indicated   Hepatitis C Screening Once for adults born between 1945 and 1965  More frequently in patients at high risk for Hepatitis C Hep C Antibody: Not on file       Diabetes Screening 1-2 times per year if you're at risk for diabetes or have pre-diabetes Fasting glucose: 90 mg/dL   A1C: No results in last 5 years    Screening Current   Cholesterol Screening Once every 5 years if you don't have a lipid disorder  May order more often based on risk factors  Lipid panel: 12/04/2020    Screening Not Indicated  History Lipid Disorder     Other Preventive Screenings Covered by Medicare:  1  Abdominal Aortic Aneurysm (AAA) Screening: covered once if your at risk  You're considered to be at risk if you have a family history of AAA  2  Lung Cancer Screening: covers low dose CT scan once per year if you meet all of the following conditions: (1) Age 50-69; (2) No signs or symptoms of lung cancer; (3) Current smoker or have quit smoking within the last 15 years; (4) You have a tobacco smoking history of at least 30 pack years (packs per day multiplied by number of years you smoked); (5) You get a written order from a healthcare provider  3  Glaucoma Screening: covered annually if you're considered high risk: (1) You have diabetes OR (2) Family history of glaucoma OR (3)  aged 48 and older OR (3)  American aged 72 and older  3  Osteoporosis Screening: covered every 2 years if you meet one of the following conditions: (1) You're estrogen deficient and at risk for osteoporosis based off medical history and other findings; (2) Have a vertebral abnormality; (3) On glucocorticoid therapy for more than 3 months; (4) Have primary hyperparathyroidism; (5) On osteoporosis medications and need to assess response to drug therapy  · Last bone density test (DXA Scan): Not on file  5  HIV Screening: covered annually if you're between the age of 12-76  Also covered annually if you are younger than 13 and older than 72 with risk factors for HIV infection   For pregnant patients, it is covered up to 3 times per pregnancy  Immunizations:  Immunization Recommendations   Influenza Vaccine Annual influenza vaccination during flu season is recommended for all persons aged >= 6 months who do not have contraindications   Pneumococcal Vaccine (Prevnar and Pneumovax)  * Prevnar = PCV13  * Pneumovax = PPSV23   Adults 25-60 years old: 1-3 doses may be recommended based on certain risk factors  Adults 72 years old: Prevnar (PCV13) vaccine recommended followed by Pneumovax (PPSV23) vaccine  If already received PPSV23 since turning 65, then PCV13 recommended at least one year after PPSV23 dose  Hepatitis B Vaccine 3 dose series if at intermediate or high risk (ex: diabetes, end stage renal disease, liver disease)   Tetanus (Td) Vaccine - COST NOT COVERED BY MEDICARE PART B Following completion of primary series, a booster dose should be given every 10 years to maintain immunity against tetanus  Td may also be given as tetanus wound prophylaxis  Tdap Vaccine - COST NOT COVERED BY MEDICARE PART B Recommended at least once for all adults  For pregnant patients, recommended with each pregnancy  Shingles Vaccine (Shingrix) - COST NOT COVERED BY MEDICARE PART B  2 shot series recommended in those aged 48 and above     Health Maintenance Due:      Topic Date Due    Hepatitis C Screening  1955    HIV Screening  06/03/1970    Cervical Cancer Screening  11/21/2019    MAMMOGRAM  02/03/2021    Colorectal Cancer Screening  05/11/2027     Immunizations Due:  There are no preventive care reminders to display for this patient  Advance Directives   What are advance directives? Advance directives are legal documents that state your wishes and plans for medical care  These plans are made ahead of time in case you lose your ability to make decisions for yourself  Advance directives can apply to any medical decision, such as the treatments you want, and if you want to donate organs  What are the types of advance directives? There are many types of advance directives, and each state has rules about how to use them  You may choose a combination of any of the following:  · Living will: This is a written record of the treatment you want  You can also choose which treatments you do not want, which to limit, and which to stop at a certain time  This includes surgery, medicine, IV fluid, and tube feedings  · Durable power of  for healthcare [facility]): This is a written record that states who you want to make healthcare choices for you when you are unable to make them for yourself  This person, called a proxy, is usually a family member or a friend  You may choose more than 1 proxy  · Do not resuscitate (DNR) order:  A DNR order is used in case your heart stops beating or you stop breathing  It is a request not to have certain forms of treatment, such as CPR  A DNR order may be included in other types of advance directives  · Medical directive: This covers the care that you want if you are in a coma, near death, or unable to make decisions for yourself  You can list the treatments you want for each condition  Treatment may include pain medicine, surgery, blood transfusions, dialysis, IV or tube feedings, and a ventilator (breathing machine)  · Values history: This document has questions about your views, beliefs, and how you feel and think about life  This information can help others choose the care that you would choose  Why are advance directives important? An advance directive helps you control your care  Although spoken wishes may be used, it is better to have your wishes written down  Spoken wishes can be misunderstood, or not followed  Treatments may be given even if you do not want them  An advance directive may make it easier for your family to make difficult choices about your care     Weight Management   Why it is important to manage your weight:  Being overweight increases your risk of health conditions such as heart disease, high blood pressure, type 2 diabetes, and certain types of cancer  It can also increase your risk for osteoarthritis, sleep apnea, and other respiratory problems  Aim for a slow, steady weight loss  Even a small amount of weight loss can lower your risk of health problems  How to lose weight safely:  A safe and healthy way to lose weight is to eat fewer calories and get regular exercise  You can lose up about 1 pound a week by decreasing the number of calories you eat by 500 calories each day  Healthy meal plan for weight management:  A healthy meal plan includes a variety of foods, contains fewer calories, and helps you stay healthy  A healthy meal plan includes the following:  · Eat whole-grain foods more often  A healthy meal plan should contain fiber  Fiber is the part of grains, fruits, and vegetables that is not broken down by your body  Whole-grain foods are healthy and provide extra fiber in your diet  Some examples of whole-grain foods are whole-wheat breads and pastas, oatmeal, brown rice, and bulgur  · Eat a variety of vegetables every day  Include dark, leafy greens such as spinach, kale, supa greens, and mustard greens  Eat yellow and orange vegetables such as carrots, sweet potatoes, and winter squash  · Eat a variety of fruits every day  Choose fresh or canned fruit (canned in its own juice or light syrup) instead of juice  Fruit juice has very little or no fiber  · Eat low-fat dairy foods  Drink fat-free (skim) milk or 1% milk  Eat fat-free yogurt and low-fat cottage cheese  Try low-fat cheeses such as mozzarella and other reduced-fat cheeses  · Choose meat and other protein foods that are low in fat  Choose beans or other legumes such as split peas or lentils  Choose fish, skinless poultry (chicken or turkey), or lean cuts of red meat (beef or pork)  Before you cook meat or poultry, cut off any visible fat  · Use less fat and oil  Try baking foods instead of frying them  Add less fat, such as margarine, sour cream, regular salad dressing and mayonnaise to foods  Eat fewer high-fat foods  Some examples of high-fat foods include french fries, doughnuts, ice cream, and cakes  · Eat fewer sweets  Limit foods and drinks that are high in sugar  This includes candy, cookies, regular soda, and sweetened drinks  Exercise:  Exercise at least 30 minutes per day on most days of the week  Some examples of exercise include walking, biking, dancing, and swimming  You can also fit in more physical activity by taking the stairs instead of the elevator or parking farther away from stores  Ask your healthcare provider about the best exercise plan for you  © Copyright [employer] 2018 Information is for End User's use only and may not be sold, redistributed or otherwise used for commercial purposes   All illustrations and images included in CareNotes® are the copyrighted property of [employer]  or [address] [place] [address]

## 2021-01-13 NOTE — PROGRESS NOTES
Assessment and Plan:     Problem List Items Addressed This Visit     None           Preventive health issues were discussed with patient, and age appropriate screening tests were ordered as noted in patient's After Visit Summary  Personalized health advice and appropriate referrals for health education or preventive services given if needed, as noted in patient's After Visit Summary  History of Present Illness:     Patient presents for Medicare Annual Wellness visit    Patient Care Team:  Jennifer Han DO as PCP - General (General Practice)  Yovany Haile MD as Endoscopist     Problem List:     Patient Active Problem List   Diagnosis    Rupture of left biceps tendon    Chronic left shoulder pain    Migraine with aura and without status migrainosus, not intractable    Numbness and tingling in right hand    Right hand weakness    Medial epicondylitis of elbow, right    Tendonitis of wrist, right    Carpal tunnel syndrome on left    Anxiety    Arthritis    Other depressive disorder    Elevated transaminase level    Hypertension    Iron deficiency anemia    Memory changes    Myofascial pain syndrome    Postconcussion syndrome    Encounter for gynecological examination without abnormal finding    Mixed hyperlipidemia    Edema    SOB (shortness of breath)    Chest pain    Bradycardia    Tinnitus      Past Medical and Surgical History:     Past Medical History:   Diagnosis Date    Anxiety     Arthritis     Bradycardia     Depression     Edema     Head injury     Heart murmur     History of echocardiogram 2016    SEH-hypertensive hemodynamic response, LVH, echo portion is negative for ischemia      History of palpitations     Hyperlipidemia     Hypertension     Migraine     PVD (peripheral vascular disease) (HCC)     Shortness of breath      Past Surgical History:   Procedure Laterality Date     SECTION      COLONOSCOPY      LA ESOPHAGOGASTRODUODENOSCOPY TRANSORAL DIAGNOSTIC N/A 2017    Procedure: EGD AND COLONOSCOPY;  Surgeon: Angelica Grimm MD;  [facility];   Service: Gastroenterology    [place] SHLDR ARTHROSCOP,SURG,W/ROTAT CUFF REPR Left 2017    Procedure: SHOULDER ARTHROSCOPY ROTATOR CUFF REPAIR; SUBACROMIAL DECOMPRESSION; BICEPS TENODESIS;  Surgeon: Coby Youssef DO;  [facility];  Service: Orthopedics    TONSILLECTOMY        Family History:     Family History   Problem Relation Age of Onset    Migraines Mother     Aortic aneurysm Mother     Heart disease Mother     Aneurysm Father         brain stem    Heart disease Father     Hypertension Father     No Known Problems Daughter     No Known Problems Maternal Grandmother     No Known Problems Paternal Grandmother     No Known Problems Paternal Aunt     No Known Problems Paternal Aunt     No Known Problems Paternal Aunt     No Known Problems Paternal Aunt     Multiple sclerosis Brother     Heart disease Maternal Uncle     Heart attack Maternal Uncle     Heart disease Maternal Uncle     Heart disease Maternal Uncle     Heart disease Maternal Uncle     Heart disease Maternal Uncle     Breast cancer Neg Hx       Social History:        Social History     Socioeconomic History    Marital status: /Civil Union     Spouse name: Not on file    Number of children: Not on file    Years of education: Not on file    Highest education level: Not on file   Occupational History    Not on file   Social Needs    Financial resource strain: Not on file    Food insecurity     Worry: Not on file     Inability: Not on file    Transportation needs     Medical: Not on file     Non-medical: Not on file   Tobacco Use    Smoking status: Former Smoker     Quit date:      Years since quittin 0    Smokeless tobacco: Never Used   Substance and Sexual Activity    Alcohol use: No    Drug use: No    Sexual activity: Yes     Birth control/protection: Post-menopausal Lifestyle    Physical activity     Days per week: Not on file     Minutes per session: Not on file    Stress: Not on file   Relationships    Social connections     Talks on phone: Not on file     Gets together: Not on file     Attends Hinduism service: Not on file     Active member of club or organization: Not on file     Attends meetings of clubs or organizations: Not on file     Relationship status: Not on file    Intimate partner violence     Fear of current or ex partner: Not on file     Emotionally abused: Not on file     Physically abused: Not on file     Forced sexual activity: Not on file   Other Topics Concern    Not on file   Social History Narrative    Not on file      Medications and Allergies:     Current Outpatient Medications   Medication Sig Dispense Refill    ALPRAZolam (XANAX) 0 25 mg tablet Take 1 tablet (0 25 mg total) by mouth 3 (three) times a day as needed for anxiety Usually takes 1/2 tab 25 tablet 0    BOTOX 100 units Inject 155 units intramuscularly every 90 days 200 Units 2    furosemide (LASIX) 40 mg tablet TAKE 1 TABLET BY MOUTH EVERY DAY 90 tablet 2    hydrALAZINE (APRESOLINE) 50 mg tablet Take 1 tablet (50 mg total) by mouth 2 (two) times a day 60 tablet 5    ondansetron (ZOFRAN-ODT) 8 mg disintegrating tablet TAKE ONE (1) TABLET BY MOUTH EVERY 6 HOURS AS NEEDED      SUMAtriptan (IMITREX) 100 mg tablet One p o  P r n  Headache, may repeat 1 after 2 hours p r n  Maximum 2/24 hours 9 tablet 5    butalbital-acetaminophen-caffeine (FIORICET,ESGIC) -40 mg per tablet 1 tablet q6 hours prn migraine  No more than 2-3 per week (Patient not taking: Reported on 1/13/2021) 15 tablet 2    pravastatin (PRAVACHOL) 40 mg tablet Take 0 5 tablets (20 mg total) by mouth daily at bedtime (Patient not taking: Reported on 1/13/2021) 30 tablet 3     No current facility-administered medications for this visit        Allergies   Allergen Reactions    Eggs Or Egg-Derived Products Anaphylaxis    Flu Virus Vaccine Anaphylaxis    Iodine Anaphylaxis    Decadron [Dexamethasone] Nausea Only     Any steroid per pt   Advil [Ibuprofen] Swelling     Lips and eyes swelling    Naproxen Swelling     Lips and eyes      Immunizations:     Immunization History   Administered Date(s) Administered    Zoster Vaccine Recombinant 09/13/2018, 01/04/2021      Health Maintenance:         Topic Date Due    Hepatitis C Screening  1955    HIV Screening  06/03/1970    Cervical Cancer Screening  11/21/2019    MAMMOGRAM  02/03/2021    Colorectal Cancer Screening  05/11/2027         Topic Date Due    DTaP,Tdap,and Td Vaccines (1 - Tdap) 06/03/1976    Pneumococcal Vaccine: 65+ Years (1 of 1 - PPSV23) 06/03/2020    Influenza Vaccine (1) 09/01/2020      Medicare Health Risk Assessment:     /74 (BP Location: Left arm, Patient Position: Sitting, Cuff Size: Adult)   Pulse 66   Temp (!) 97 °F (36 1 °C)   Resp 16   Ht 5' 0 25" (1 53 m)   Wt 60 8 kg (134 lb)   LMP  (LMP Unknown)   SpO2 97%   BMI 25 95 kg/m²      Ciro Navarro is here for her Initial Wellness visit  Health Risk Assessment:   Patient rates overall health as good  Patient feels that their physical health rating is same  Eyesight was rated as same  Hearing was rated as slightly worse  Patient feels that their emotional and mental health rating is same  Pain experienced in the last 7 days has been none  Patient states that she has experienced no weight loss or gain in last 6 months  Depression Screening:   PHQ-2 Score: 0  PHQ-9 Score: 0      Fall Risk Screening: In the past year, patient has experienced: no history of falling in past year      Urinary Incontinence Screening:   Patient has not leaked urine accidently in the last six months  Home Safety:  Patient does not have trouble with stairs inside or outside of their home  Patient has working smoke alarms and has working carbon monoxide detector   Home safety hazards include: none  Nutrition:   Current diet is Regular  Medications:   Patient is currently taking over-the-counter supplements  OTC medications include: see medication list  Patient is able to manage medications  Activities of Daily Living (ADLs)/Instrumental Activities of Daily Living (IADLs):   Walk and transfer into and out of bed and chair?: Yes  Dress and groom yourself?: Yes    Bathe or shower yourself?: Yes    Feed yourself? Yes  Do your laundry/housekeeping?: Yes  Manage your money, pay your bills and track your expenses?: Yes  Make your own meals?: Yes    Do your own shopping?: Yes    Durable Medical Equipment Suppliers  none    Previous Hospitalizations:   Any hospitalizations or ED visits within the last 12 months?: No      Advance Care Planning:   Living will: No    Durable POA for healthcare: No    Advanced directive: No      Cognitive Screening:   Provider or family/friend/caregiver concerned regarding cognition?: No    PREVENTIVE SCREENINGS      Cardiovascular Screening:    General: Screening Not Indicated, History Lipid Disorder, Risks and Benefits Discussed and Screening Current      Diabetes Screening:     General: Screening Current and Risks and Benefits Discussed      Colorectal Cancer Screening:     General: Screening Current      Breast Cancer Screening:     General: Screening Current and Risks and Benefits Discussed      Cervical Cancer Screening:    General: Screening Not Indicated, Risks and Benefits Discussed and Screening Current      Osteoporosis Screening:    General: Risks and Benefits Discussed    Due for: DXA Axial      Abdominal Aortic Aneurysm (AAA) Screening:        General: Screening Not Indicated      Lung Cancer Screening:     General: Screening Not Indicated      Hepatitis C Screening:    General: Screening Not Indicated    BMI Counseling: Body mass index is 25 95 kg/m²   The BMI is above normal  Nutrition recommendations include 3-5 servings of fruits/vegetables daily     Maya Ta MD

## 2021-01-20 ENCOUNTER — OFFICE VISIT (OUTPATIENT)
Dept: NEUROLOGY | Facility: CLINIC | Age: 66
End: 2021-01-20
Payer: MEDICARE

## 2021-01-20 VITALS
HEIGHT: 60 IN | WEIGHT: 128 LBS | DIASTOLIC BLOOD PRESSURE: 78 MMHG | HEART RATE: 69 BPM | BODY MASS INDEX: 25.13 KG/M2 | SYSTOLIC BLOOD PRESSURE: 140 MMHG

## 2021-01-20 DIAGNOSIS — G43.719 INTRACTABLE CHRONIC MIGRAINE WITHOUT AURA AND WITHOUT STATUS MIGRAINOSUS: Primary | ICD-10-CM

## 2021-01-20 PROCEDURE — 99213 OFFICE O/P EST LOW 20 MIN: CPT | Performed by: PSYCHIATRY & NEUROLOGY

## 2021-01-20 RX ORDER — RIZATRIPTAN BENZOATE 10 MG/1
TABLET, ORALLY DISINTEGRATING ORAL
Qty: 9 TABLET | Refills: 5 | Status: SHIPPED | OUTPATIENT
Start: 2021-01-20 | End: 2021-01-29

## 2021-01-20 NOTE — PROGRESS NOTES
Daniel Pandey is a 72 y o  female  Returns with history of headaches    Assessment:  1  Intractable chronic migraine without aura and without status migrainosus        Plan:  Trial of Maxalt as needed   Repeat Botox in March   Follow-up after next Botox    Discussion:   Omar Jimmymarty has noted some improvement in her headache frequency and intensity since recent Botox  She understands that at times it takes a few rounds of injections to reach the best effect  She did have adverse effects from Imitrex and thus will try Maxalt  We discussed how to take the medicine and potential adverse effects  If she has problems with that she will notify me otherwise I will see her back following her next Botox in March      Subjective:    HPI   Esperanza Huber returns in follow-up today  She denies any adverse effects from her Botox injection a few weeks ago other than she feels that her eyelids are a bit heavy  She states that the head pains that she was getting in the posterior head region have improved significantly  She continues to have some headaches  She states she will go for 3 or 4 days without a headache but then get a headache that will last for a few days  She did try Imitrex and did develop some chest pressure which made her feel uncomfortable  She tried using a half but has not found this to consistently get rid of her headache  Past Medical History:   Diagnosis Date    Anxiety     Arthritis     Bradycardia     Depression     Edema     Head injury     Heart murmur     History of echocardiogram 07/18/2016    SEH-hypertensive hemodynamic response, LVH, echo portion is negative for ischemia      History of palpitations     Hyperlipidemia     Hypertension     Migraine     PVD (peripheral vascular disease) (HCC)     Shortness of breath        Family History:  Family History   Problem Relation Age of Onset   Miki Jada Migraines Mother     Aortic aneurysm Mother     Heart disease Mother     Aneurysm Father brain stem    Heart disease Father     Hypertension Father     No Known Problems Daughter     No Known Problems Maternal Grandmother     No Known Problems Paternal Grandmother     No Known Problems Paternal Aunt     No Known Problems Paternal Aunt     No Known Problems Paternal Aunt     No Known Problems Paternal Aunt     Multiple sclerosis Brother     Heart disease Maternal Uncle     Heart attack Maternal Uncle     Heart disease Maternal Uncle     Heart disease Maternal Uncle     Heart disease Maternal Uncle     Heart disease Maternal Uncle     Breast cancer Neg Hx        Past Surgical History:  Past Surgical History:   Procedure Laterality Date     SECTION      COLONOSCOPY      [place] ESOPHAGOGASTRODUODENOSCOPY TRANSORAL DIAGNOSTIC N/A 2017    Procedure: EGD AND COLONOSCOPY;  Surgeon: Chayito Rowe MD;  [facility]; Service: Gastroenterology    [place] [address] [place] ARTHROSCOP,SURG,W/ROTAT CUFF REPR Left 2017    Procedure: SHOULDER ARTHROSCOPY ROTATOR CUFF REPAIR; SUBACROMIAL DECOMPRESSION; BICEPS TENODESIS;  Surgeon: Ayden Paez DO;  [facility];  Service: Orthopedics    TONSILLECTOMY         Social History:   reports that she quit smoking about 31 years ago  She has never used smokeless tobacco  She reports that she does not drink alcohol or use drugs      Allergies:  Eggs or egg-derived products, Flu virus vaccine, Iodine, Decadron [dexamethasone], Advil [ibuprofen], and Naproxen      Current Outpatient Medications:     ALPRAZolam (XANAX) 0 25 mg tablet, Take 1 tablet (0 25 mg total) by mouth 3 (three) times a day as needed for anxiety Usually takes 1/2 tab, Disp: 25 tablet, Rfl: 0    BOTOX 100 units, Inject 155 units intramuscularly every 90 days, Disp: 200 Units, Rfl: 2    furosemide (LASIX) 40 mg tablet, TAKE 1 TABLET BY MOUTH EVERY DAY, Disp: 90 tablet, Rfl: 2    hydrALAZINE (APRESOLINE) 50 mg tablet, Take 1 tablet (50 mg total) by mouth 2 (two) times a day, Disp: 60 tablet, Rfl: 5    ondansetron (ZOFRAN-ODT) 8 mg disintegrating tablet, TAKE ONE (1) TABLET BY MOUTH EVERY 6 HOURS AS NEEDED, Disp: , Rfl:     SUMAtriptan (IMITREX) 100 mg tablet, One p o  P r n  Headache, may repeat 1 after 2 hours p r n  Maximum 2/24 hours, Disp: 9 tablet, Rfl: 5     I have reviewed the past medical, social and family history, current medications, allergies, vitals, review of systems and updated this information as appropriate today     Objective:    Vitals:  Blood pressure 140/78, pulse 69, height 5' 0 25" (1 53 m), weight 58 1 kg (128 lb), not currently breastfeeding  Physical Exam    Neurological Exam   GENERAL:  Well-developed well-nourished woman in no acute distress  HEENT/NECK: Head is atraumatic normocephalic, neck is supple  NEUROLOGIC:  Mental Status: Awake and alert without aphasia  Cranial Nerves: Extraocular movements are full  Face is symmetrical  Coordination:  Gait is stable            ROS:    Review of Systems   Constitutional: Negative  Negative for appetite change and fever  HENT: Negative  Negative for hearing loss, tinnitus, trouble swallowing and voice change  Eyes: Negative  Negative for photophobia and pain  Respiratory: Negative  Negative for shortness of breath  Cardiovascular: Negative  Negative for palpitations  Gastrointestinal: Negative  Negative for nausea and vomiting  Endocrine: Negative  Negative for cold intolerance  Genitourinary: Negative  Negative for dysuria, frequency and urgency  Musculoskeletal: Negative  Negative for myalgias and neck pain  Skin: Negative  Negative for rash  Allergic/Immunologic: Negative  Neurological: Positive for headaches (headaches staretd 5 days after Botox , very bad headache last night )  Negative for dizziness, tremors, seizures, syncope, facial asymmetry, speech difficulty, weakness, light-headedness and numbness  Hematological: Negative  Does not bruise/bleed easily  Psychiatric/Behavioral: Negative  Negative for confusion, hallucinations and sleep disturbance

## 2021-01-29 ENCOUNTER — TELEPHONE (OUTPATIENT)
Dept: NEUROLOGY | Facility: CLINIC | Age: 66
End: 2021-01-29

## 2021-01-29 DIAGNOSIS — G43.719 INTRACTABLE CHRONIC MIGRAINE WITHOUT AURA AND WITHOUT STATUS MIGRAINOSUS: Primary | ICD-10-CM

## 2021-01-29 RX ORDER — ZOLMITRIPTAN 5 MG/1
TABLET, FILM COATED ORAL
Qty: 9 TABLET | Refills: 5 | Status: SHIPPED | OUTPATIENT
Start: 2021-01-29 | End: 2021-02-12 | Stop reason: SDUPTHER

## 2021-01-29 NOTE — TELEPHONE ENCOUNTER
Patient called regarding the medication Maxalt 10mg you prescribed on 01/20/2021  She stated this medication is not doing anything for her  She is still have migraines      Her pharmacy is in [facility]    Patient would like a call back    Thank you

## 2021-01-29 NOTE — TELEPHONE ENCOUNTER
Spoke with patient who is intolerant of Imitrex and did not find Maxalt helpful  Will send in prescription for Zomig

## 2021-02-10 DIAGNOSIS — G43.719 INTRACTABLE CHRONIC MIGRAINE WITHOUT AURA AND WITHOUT STATUS MIGRAINOSUS: ICD-10-CM

## 2021-02-10 NOTE — TELEPHONE ENCOUNTER
Patient called requesting a TIER exception on her Zolmitriptan  Submitted request to [facility] on determination

## 2021-02-12 RX ORDER — ZOLMITRIPTAN 5 MG/1
TABLET, FILM COATED ORAL
Qty: 9 TABLET | Refills: 5 | Status: SHIPPED | OUTPATIENT
Start: 2021-02-12 | End: 2021-02-24

## 2021-02-12 NOTE — TELEPHONE ENCOUNTER
Rigo Lombardi approved from 2/11/21 through 12/31/21    Called [facility] left voicemail making them aware of approval

## 2021-02-12 NOTE — TELEPHONE ENCOUNTER
Pt called and states that zomig auth went through and pharmacy lower it to Tier 2, copay is $86  This is still too much  Advised pt to download goodrx coupon card  Can get this med at [facility], [employer], [facility], [facility] for $26 39  [address] [place] [address] for 27 04  [facility] 28 94  [facility] 36 56    Called [facility] pharmacy and states that the reason why the copay is high is bec pt has a deductible  Pt made aware  Pt is requesting script be transferred to [address] [place] [address] in [place] ([phone])  She will be using good rx  Rx entered   Pls review and sign off            [phone]

## 2021-02-12 NOTE — TELEPHONE ENCOUNTER
[facility], spoke w/Wally confirmed that they received the zomig script  Made aware that pt will be using goodrx coupon card  Provided goodrx coupon info (ID [id number] , group [id number]  BIN [id number], PCN [id number]) as pt is unable to download the phillip  Andreina Thompsonjase states that claim was processed successfully w/ that info  Copay is $27 04, but won't be avail until 5 pm on Monday  Pt made aware  Pt states that she feels that her migraine is coming  Pressure/throbbing Frontal area and eyes and pinching on top of the head  +nausea  [place] is the weather  Pt states that since she has to wait until Monday to take the zomig, is there other med she can take for now?  Requesting another abortive med be sent to [facility] pharmacy on file    Thanks

## 2021-02-23 ENCOUNTER — TELEPHONE (OUTPATIENT)
Dept: NEUROLOGY | Facility: CLINIC | Age: 66
End: 2021-02-23

## 2021-02-24 ENCOUNTER — TELEPHONE (OUTPATIENT)
Dept: NEUROLOGY | Facility: CLINIC | Age: 66
End: 2021-02-24

## 2021-02-24 DIAGNOSIS — G43.719 INTRACTABLE CHRONIC MIGRAINE WITHOUT AURA AND WITHOUT STATUS MIGRAINOSUS: Primary | ICD-10-CM

## 2021-02-24 RX ORDER — RIMEGEPANT SULFATE 75 MG/75MG
TABLET, ORALLY DISINTEGRATING ORAL
Qty: 8 TABLET | Refills: 5 | Status: SHIPPED | OUTPATIENT
Start: 2021-02-24 | End: 2021-04-14

## 2021-02-24 NOTE — TELEPHONE ENCOUNTER
Patient calling I stating that the zolmitriptan Dr Zuleyma Munoz prescribed her made her ill  States she had nausea, vomiting, and saw black spots  Those have resolved after discontinuing medication  Patient continues to have headaches  Current headache is 6/10 pain  Pain is across eyebrows to top of head  Describes pain as stabbing in the eyes, eyebrows, and top of head  Complains of nausea, blurred vision, dizziness, extreme light/sound sensitivity (is wearing sunglasses in the house)  Any other recommendations? Patient questioning Sherly Alexander or Nurtec as she has seen them on TV  Does not want to try another triptan medication as she does not tolerate them well  Please advise      Cb#: [phone], okay to leave detailed message

## 2021-02-24 NOTE — TELEPHONE ENCOUNTER
Nurtec approved until further notice    Called [facility] pharmacy, made them aware of approval  Brynn López will be $560     Called patient, she was already made aware of this is states that it is a Tier 4 mdication and would need a tier exception lowering it to a Tier 2 lowering her copay to about $12  States Ciro Turner is sending us a form to complete for this  Called [facility], Tier exception form to be faxed to office for completion

## 2021-02-24 NOTE — TELEPHONE ENCOUNTER
ID: [id number]  [place]: [id number]  PCN: [id number]  Group: [id number]  [phone]    Submitted PA on CM for Nurtec, awaiting determination  Key: [facility]    Called and informed patient that PA was submitted  She would like a call with determination

## 2021-02-25 ENCOUNTER — DOCUMENTATION (OUTPATIENT)
Dept: NEUROLOGY | Facility: CLINIC | Age: 66
End: 2021-02-25

## 2021-02-25 NOTE — TELEPHONE ENCOUNTER
Received form for tier exception  Completed form and faxed to [phone] with recent office note    Awaiting determination

## 2021-02-25 NOTE — TELEPHONE ENCOUNTER
Pt calling to state that she received a call from her insurance that they will not do a tier exception for the Nurtec  Patient is frustrated as she has had a reaction to every triptan she has tried (sumatriptan, zolmitriptan, and rizatriptan- check pain and worsening of migraines)  Pt cannot afford the cost of the Nurtec  Would Fioricet be reconsidered at this time? Pt states that this medication has worked for her in the past  She would like to be able to take Nurtec, ubrelvy or another new migraine medication however, she is limited by her pharmacy plan and is exempt from copay assistance since she has medicare  Please advise is fioricet is an option  If not, please advise on another abortive medication that pt can try

## 2021-02-25 NOTE — PROGRESS NOTES
Type Date User Summary Attachment   General 02/23/2021  1:49 PM Lisbeth Shearer care coordination  -   Note    Botox- no authorization needed   Please use our stock      Thank you     Matthieu Jacques

## 2021-03-01 NOTE — TELEPHONE ENCOUNTER
Tier Exception Denied  This drug is not listed on the formulary  A request to lower the copayment (tier exception) cannot apply to drugs not listed on the formulary

## 2021-03-02 NOTE — TELEPHONE ENCOUNTER
I spoke with Penelope Puente, unfortunately she is allergic to nonsteroidal anti-inflammatory medications (lip swelling) and is intolerant of Triptans  She cannot afford the new CGRP inhibitors  She does not want to take Fioricet due to the potential addictive nature of the medication  She states she has been using Excedrin infrequently and hopes that the Botox will kick in

## 2021-03-10 DIAGNOSIS — Z23 ENCOUNTER FOR IMMUNIZATION: ICD-10-CM

## 2021-03-11 NOTE — TELEPHONE ENCOUNTER
Patient stated she received a denial in the mail for nurtec  Informed her this just crossed in the mail and to disregard

## 2021-03-18 ENCOUNTER — PROCEDURE VISIT (OUTPATIENT)
Dept: NEUROLOGY | Facility: CLINIC | Age: 66
End: 2021-03-18
Payer: MEDICARE

## 2021-03-18 DIAGNOSIS — G43.719 INTRACTABLE CHRONIC MIGRAINE WITHOUT AURA AND WITHOUT STATUS MIGRAINOSUS: Primary | ICD-10-CM

## 2021-03-18 PROCEDURE — 64615 CHEMODENERV MUSC MIGRAINE: CPT | Performed by: PSYCHIATRY & NEUROLOGY

## 2021-03-18 RX ORDER — BUTALBITAL, ACETAMINOPHEN AND CAFFEINE 50; 325; 40 MG/1; MG/1; MG/1
1 TABLET ORAL EVERY 4 HOURS PRN
Qty: 30 TABLET | Refills: 2 | Status: SHIPPED | OUTPATIENT
Start: 2021-03-18 | End: 2021-06-17 | Stop reason: SDUPTHER

## 2021-04-05 ENCOUNTER — TELEPHONE (OUTPATIENT)
Dept: NEUROLOGY | Facility: CLINIC | Age: 66
End: 2021-04-05

## 2021-04-05 ENCOUNTER — OFFICE VISIT (OUTPATIENT)
Dept: NEUROLOGY | Facility: CLINIC | Age: 66
End: 2021-04-05
Payer: MEDICARE

## 2021-04-05 VITALS
BODY MASS INDEX: 25.11 KG/M2 | DIASTOLIC BLOOD PRESSURE: 78 MMHG | WEIGHT: 133 LBS | SYSTOLIC BLOOD PRESSURE: 124 MMHG | HEART RATE: 70 BPM | HEIGHT: 61 IN

## 2021-04-05 DIAGNOSIS — G43.719 INTRACTABLE CHRONIC MIGRAINE WITHOUT AURA AND WITHOUT STATUS MIGRAINOSUS: Primary | ICD-10-CM

## 2021-04-05 PROCEDURE — 99213 OFFICE O/P EST LOW 20 MIN: CPT | Performed by: PSYCHIATRY & NEUROLOGY

## 2021-04-05 NOTE — TELEPHONE ENCOUNTER
Submitted a PA for fioricet  Advised the patient to use the GoodRx coupon for now until we receive a determination from the insurance

## 2021-04-05 NOTE — TELEPHONE ENCOUNTER
Patient dropped off a letter from [facility] stating her BUT/APAP/CAF Tablets are not covered under her formulary  She states she has tried Sumatriptan and Rizatriptan with no success so they are not an option for her   Please advise

## 2021-04-05 NOTE — PROGRESS NOTES
Kiik Guerrier is a 72 y o  female  Returns in follow-up today with history of migraine headache    Assessment:  1  Intractable chronic migraine without aura and without status migrainosus        Plan:   repeat Botox in June   Follow-up afterwards    Discussion:   Nai Licona has noted an improvement with respect to migraine frequency and severity following her 2nd Botox injection  She still does not have anything to take when she gets a migraine headache  Apparently Fioricet has been denied by her insurance  Will attempt to obtain preauthorization and I will see her back in follow-up following her next injection June      Subjective:    HPI   Nai Licona returns in follow-up today  She states that following her last Botox injection she did have some discomfort in her neck but this resolved  She states she has noted a definite improvement with brief respect to the frequency and severity of her migraine headaches after her 2nd round of Botox compared to the 1st   She still does not have any medication to take when she gets a headache  Fioricet was covered by her insurance for 1 prescription but now is been denied  She is intolerant of Triptan medications and find CGRP medications too expensive  Past Medical History:   Diagnosis Date    Anxiety     Arthritis     Bradycardia     Depression     Edema     Head injury     Heart murmur     History of echocardiogram 07/18/2016    SEH-hypertensive hemodynamic response, LVH, echo portion is negative for ischemia      History of palpitations     Hyperlipidemia     Hypertension     Migraine     PVD (peripheral vascular disease) (HCC)     Shortness of breath        Family History:  Family History   Problem Relation Age of Onset   [place] Migraines Mother     Aortic aneurysm Mother     Heart disease Mother     Aneurysm Father         brain stem    Heart disease Father     Hypertension Father     No Known Problems Daughter     No Known Problems Maternal Grandmother  No Known Problems Paternal Grandmother     No Known Problems Paternal Aunt     No Known Problems Paternal Aunt     No Known Problems Paternal Aunt     No Known Problems Paternal Aunt     Multiple sclerosis Brother     Heart disease Maternal Uncle     Heart attack Maternal Uncle     Heart disease Maternal Uncle     Heart disease Maternal Uncle     Heart disease Maternal Uncle     Heart disease Maternal Uncle     Breast cancer Neg Hx        Past Surgical History:  Past Surgical History:   Procedure Laterality Date     SECTION      COLONOSCOPY      [place] ESOPHAGOGASTRODUODENOSCOPY TRANSORAL DIAGNOSTIC N/A 2017    Procedure: EGD AND COLONOSCOPY;  Surgeon: Eunice Urena MD;  [facility]; Service: Gastroenterology    [place] [address] ARTHROSCOP,SURG,W/ROTAT CUFF REPR Left 2017    Procedure: SHOULDER ARTHROSCOPY ROTATOR CUFF REPAIR; SUBACROMIAL DECOMPRESSION; BICEPS TENODESIS;  Surgeon: Michelle Machado DO;  [facility];  Service: Orthopedics    TONSILLECTOMY         Social History:   reports that she quit smoking about 31 years ago  She has never used smokeless tobacco  She reports that she does not drink alcohol or use drugs      Allergies:  Eggs or egg-derived products - food allergy, Flu virus vaccine, Iodine - food allergy, Decadron [dexamethasone], Advil [ibuprofen], Naproxen, and Sulfa antibiotics      Current Outpatient Medications:     ALPRAZolam (XANAX) 0 25 mg tablet, Take 1 tablet (0 25 mg total) by mouth 3 (three) times a day as needed for anxiety Usually takes 1/2 tab, Disp: 25 tablet, Rfl: 0    BOTOX 100 units, Inject 155 units intramuscularly every 90 days, Disp: 200 Units, Rfl: 2    butalbital-acetaminophen-caffeine (FIORICET,ESGIC) -40 mg per tablet, Take 1 tablet by mouth every 4 (four) hours as needed for headaches, Disp: 30 tablet, Rfl: 2    furosemide (LASIX) 40 mg tablet, TAKE 1 TABLET BY MOUTH EVERY DAY, Disp: 90 tablet, Rfl: 2    hydrALAZINE (APRESOLINE) 50 mg tablet, Take 1 tablet (50 mg total) by mouth 2 (two) times a day, Disp: 60 tablet, Rfl: 5    ondansetron (ZOFRAN-ODT) 8 mg disintegrating tablet, TAKE ONE (1) TABLET BY MOUTH EVERY 6 HOURS AS NEEDED, Disp: , Rfl:     Rimegepant Sulfate (Nurtec) 75 MG TBDP, One by mouth q day p r n  headache, Disp: 8 tablet, Rfl: 5      I have reviewed the past medical, social and family history, current medications, allergies, vitals, review of systems and updated this information as appropriate today     Objective:    Vitals:  Blood pressure 124/78, pulse 70, height 5' 0 75" (1 543 m), weight 60 3 kg (133 lb), not currently breastfeeding  Physical Exam    Neurological Exam   GENERAL:  Well-developed well-nourished woman in no acute distress  HEENT/NECK: Head is atraumatic normocephalic, neck is supple  NEUROLOGIC:  Mental Status: Awake and alert without aphasia  Cranial Nerves: Extraocular movements are full  Face is symmetrical  Coordination:    Gait is stable            ROS:    Review of Systems   Constitutional: Negative  Negative for appetite change and fever  HENT: Negative  Negative for hearing loss, tinnitus, trouble swallowing and voice change  Eyes: Positive for photophobia, pain and redness  Respiratory: Negative  Negative for shortness of breath  Cardiovascular: Positive for leg swelling  Negative for palpitations  Gastrointestinal: Positive for nausea  Negative for vomiting  Endocrine: Negative  Negative for cold intolerance  Genitourinary: Negative  Negative for dysuria, frequency and urgency  Musculoskeletal: Positive for arthralgias and neck pain  Negative for back pain, joint swelling and myalgias  Skin: Negative  Negative for rash  Neurological: Positive for dizziness, weakness (hands and legs), numbness and headaches  Negative for tremors, seizures, syncope, facial asymmetry, speech difficulty and light-headedness  Hematological: Negative    Does not bruise/bleed easily  Psychiatric/Behavioral: Negative  Negative for confusion, hallucinations and sleep disturbance

## 2021-04-09 ENCOUNTER — APPOINTMENT (OUTPATIENT)
Dept: LAB | Facility: CLINIC | Age: 66
End: 2021-04-09
Payer: MEDICARE

## 2021-04-09 DIAGNOSIS — E78.2 MIXED HYPERLIPIDEMIA: ICD-10-CM

## 2021-04-09 LAB
CHOLEST SERPL-MCNC: 233 MG/DL (ref 50–200)
HDLC SERPL-MCNC: 75 MG/DL
LDLC SERPL CALC-MCNC: 146 MG/DL (ref 0–100)
TRIGL SERPL-MCNC: 58 MG/DL

## 2021-04-09 PROCEDURE — 80061 LIPID PANEL: CPT

## 2021-04-09 PROCEDURE — 36415 COLL VENOUS BLD VENIPUNCTURE: CPT

## 2021-04-14 ENCOUNTER — OFFICE VISIT (OUTPATIENT)
Dept: FAMILY MEDICINE CLINIC | Facility: CLINIC | Age: 66
End: 2021-04-14
Payer: MEDICARE

## 2021-04-14 ENCOUNTER — TELEPHONE (OUTPATIENT)
Dept: FAMILY MEDICINE CLINIC | Facility: CLINIC | Age: 66
End: 2021-04-14

## 2021-04-14 VITALS
SYSTOLIC BLOOD PRESSURE: 124 MMHG | WEIGHT: 133.4 LBS | BODY MASS INDEX: 25.19 KG/M2 | HEART RATE: 68 BPM | DIASTOLIC BLOOD PRESSURE: 68 MMHG | OXYGEN SATURATION: 98 % | HEIGHT: 61 IN | TEMPERATURE: 97 F | RESPIRATION RATE: 16 BRPM

## 2021-04-14 DIAGNOSIS — I10 ESSENTIAL HYPERTENSION: ICD-10-CM

## 2021-04-14 DIAGNOSIS — R60.9 EDEMA, UNSPECIFIED TYPE: ICD-10-CM

## 2021-04-14 DIAGNOSIS — H93.13 TINNITUS OF BOTH EARS: ICD-10-CM

## 2021-04-14 DIAGNOSIS — E78.2 MIXED HYPERLIPIDEMIA: Primary | ICD-10-CM

## 2021-04-14 PROBLEM — J44.9 CHRONIC OBSTRUCTIVE PULMONARY DISEASE (HCC): Status: RESOLVED | Noted: 2021-04-14 | Resolved: 2021-04-14

## 2021-04-14 PROBLEM — J44.9 CHRONIC OBSTRUCTIVE PULMONARY DISEASE (HCC): Status: ACTIVE | Noted: 2021-04-14

## 2021-04-14 PROCEDURE — 99214 OFFICE O/P EST MOD 30 MIN: CPT | Performed by: FAMILY MEDICINE

## 2021-04-14 RX ORDER — FUROSEMIDE 40 MG/1
40 TABLET ORAL DAILY
Qty: 90 TABLET | Refills: 3 | Status: SHIPPED | OUTPATIENT
Start: 2021-04-14 | End: 2021-08-30 | Stop reason: SDUPTHER

## 2021-04-14 RX ORDER — ROSUVASTATIN CALCIUM 5 MG/1
5 TABLET, COATED ORAL DAILY
Qty: 90 TABLET | Refills: 1 | Status: SHIPPED | OUTPATIENT
Start: 2021-04-14 | End: 2021-07-15 | Stop reason: ALTCHOICE

## 2021-04-14 RX ORDER — HYDRALAZINE HYDROCHLORIDE 50 MG/1
50 TABLET, FILM COATED ORAL 2 TIMES DAILY
Qty: 180 TABLET | Refills: 3 | Status: SHIPPED | OUTPATIENT
Start: 2021-04-14 | End: 2021-08-30 | Stop reason: SDUPTHER

## 2021-04-14 NOTE — PROGRESS NOTES
Assessment/Plan:       Problem List Items Addressed This Visit        Cardiovascular and Mediastinum    Hypertension    Relevant Medications    furosemide (LASIX) 40 mg tablet    hydrALAZINE (APRESOLINE) 50 mg tablet       Other    Mixed hyperlipidemia - Primary     Pt agreeable to trial of Crestor  Will start and repeat labs in 3 months  Relevant Medications    rosuvastatin (CRESTOR) 5 mg tablet    Other Relevant Orders    Lipid Panel with Direct LDL reflex    Comprehensive metabolic panel    Tinnitus     F/u with ENT           Other Visit Diagnoses     Edema, unspecified type        Relevant Medications    furosemide (LASIX) 40 mg tablet    hydrALAZINE (APRESOLINE) 50 mg tablet            Subjective:      Patient ID: Jaylin Molina is a 72 y o  female  She is here with her   Had labs done  Lipids are elevated  Pt was previously unable to tolerate pravastatin (leg swelling)  She eats low cholesterol diet, she exercises regularly  Her mom had strokes and she worries about this  Lab Results       Component                Value               Date                       CHOLESTEROL              233 (H)             04/09/2021                 CHOLESTEROL              229 (H)             12/04/2020            Lab Results       Component                Value               Date                       HDL                      75                  04/09/2021                 HDL                      86                  12/04/2020            Lab Results       Component                Value               Date                       TRIG                     58                  04/09/2021                 TRIG                     62                  12/04/2020            Lab Results       Component                Value               Date                       [place]                  143                 12/04/2020                Patient's blood pressure is very well controlled on current regimen    She needs refills on hydralazine and furosemide    She cotninues with tinnitus - both ears  Comes and goes  She has an appoint with ENT on 2021      The following portions of the patient's history were reviewed and updated as appropriate:   She  has a past medical history of Anxiety, Arthritis, Bradycardia, Depression, Edema, Head injury, Heart murmur, History of echocardiogram (2016), History of palpitations, Hyperlipidemia, Hypertension, Migraine, PVD (peripheral vascular disease) ([facility] [address] ), and Shortness of breath  She   Patient Active Problem List    Diagnosis Date Noted    Tinnitus 2020    Bradycardia 2020    Mixed hyperlipidemia 2018    Numbness and tingling in right hand 10/25/2018    Carpal tunnel syndrome on left 10/25/2018    Migraine with aura and without status migrainosus, not intractable 2018    Chronic left shoulder pain 2018    Myofascial pain syndrome 2014    Anxiety 2014    Arthritis 2014    Hypertension 2014    Postconcussion syndrome 2014     She  has a past surgical history that includes  section; Colonoscopy; Tonsillectomy; pr esophagogastroduodenoscopy transoral diagnostic (N/A, 2017); and pr shldr arthroscop,surg,w/rotat cuff repr (Left, 2017)  Her family history includes Aneurysm in her father; Aortic aneurysm in her mother; Heart attack in her maternal uncle; Heart disease in her father, maternal uncle, maternal uncle, maternal uncle, maternal uncle, maternal uncle, and mother; Hypertension in her father; Migraines in her mother; Multiple sclerosis in her brother; No Known Problems in her daughter, maternal grandmother, paternal aunt, paternal aunt, paternal aunt, paternal aunt, and paternal grandmother  She  reports that she quit smoking about 31 years ago  She has never used smokeless tobacco  She reports that she does not drink alcohol or use drugs    Current Outpatient Medications   Medication Sig Dispense Refill    ALPRAZolam (XANAX) 0 25 mg tablet Take 1 tablet (0 25 mg total) by mouth 3 (three) times a day as needed for anxiety Usually takes 1/2 tab 25 tablet 0    BOTOX 100 units Inject 155 units intramuscularly every 90 days 200 Units 2    butalbital-acetaminophen-caffeine (FIORICET,ESGIC) -40 mg per tablet Take 1 tablet by mouth every 4 (four) hours as needed for headaches 30 tablet 2    furosemide (LASIX) 40 mg tablet Take 1 tablet (40 mg total) by mouth daily 90 tablet 3    hydrALAZINE (APRESOLINE) 50 mg tablet Take 1 tablet (50 mg total) by mouth 2 (two) times a day 180 tablet 3    ondansetron (ZOFRAN-ODT) 8 mg disintegrating tablet TAKE ONE (1) TABLET BY MOUTH EVERY 6 HOURS AS NEEDED      rosuvastatin (CRESTOR) 5 mg tablet Take 1 tablet (5 mg total) by mouth daily 90 tablet 1     No current facility-administered medications for this visit  Current Outpatient Medications on File Prior to Visit   Medication Sig    ALPRAZolam (XANAX) 0 25 mg tablet Take 1 tablet (0 25 mg total) by mouth 3 (three) times a day as needed for anxiety Usually takes 1/2 tab    BOTOX 100 units Inject 155 units intramuscularly every 90 days    butalbital-acetaminophen-caffeine (FIORICET,ESGIC) -40 mg per tablet Take 1 tablet by mouth every 4 (four) hours as needed for headaches    ondansetron (ZOFRAN-ODT) 8 mg disintegrating tablet TAKE ONE (1) TABLET BY MOUTH EVERY 6 HOURS AS NEEDED    [DISCONTINUED] furosemide (LASIX) 40 mg tablet TAKE 1 TABLET BY MOUTH EVERY DAY    [DISCONTINUED] hydrALAZINE (APRESOLINE) 50 mg tablet Take 1 tablet (50 mg total) by mouth 2 (two) times a day    [DISCONTINUED] Rimegepant Sulfate (Nurtec) 75 MG TBDP One by mouth q day p r n  headache (Patient not taking: Reported on 4/14/2021)     No current facility-administered medications on file prior to visit        She is allergic to eggs or egg-derived products - food allergy; flu virus vaccine; iodine - food allergy; decadron [dexamethasone]; advil [ibuprofen]; naproxen; and sulfa antibiotics       Review of Systems   Constitutional: Negative for activity change  HENT: Positive for tinnitus  Respiratory: Negative for chest tightness and shortness of breath  Cardiovascular: Negative for chest pain and leg swelling  Neurological: Negative for headaches  Psychiatric/Behavioral: Negative for dysphoric mood  The patient is not nervous/anxious  Objective:      /68 (BP Location: Left arm, Patient Position: Sitting, Cuff Size: Standard)   Pulse 68   Temp (!) 97 °F (36 1 °C)   Resp 16   Ht 5' 0 75" (1 543 m)   Wt 60 5 kg (133 lb 6 4 oz)   LMP  (LMP Unknown)   SpO2 98%   BMI 25 41 kg/m²          Physical Exam  Vitals signs and nursing note reviewed  Constitutional:       General: She is not in acute distress  Appearance: Normal appearance  She is not ill-appearing, toxic-appearing or diaphoretic  HENT:      Head: Normocephalic and atraumatic  Right Ear: External ear normal       Left Ear: External ear normal    Cardiovascular:      Rate and Rhythm: Normal rate and regular rhythm  Heart sounds: No murmur  No friction rub  Comments:  No carotid bruit bilaterally  Pulmonary:      Effort: Pulmonary effort is normal  No respiratory distress  Breath sounds: Normal breath sounds  No stridor  No wheezing, rhonchi or rales  Musculoskeletal:         General: No swelling  Right lower leg: No edema  Left lower leg: No edema  Neurological:      General: No focal deficit present  Mental Status: She is alert  Mental status is at baseline  Psychiatric:         Attention and Perception: Attention normal          Mood and Affect: Mood normal          Speech: Speech normal          Behavior: Behavior normal          Thought Content:  Thought content normal          Judgment: Judgment normal

## 2021-04-20 ENCOUNTER — HOSPITAL ENCOUNTER (OUTPATIENT)
Dept: MAMMOGRAPHY | Facility: CLINIC | Age: 66
Discharge: HOME | End: 2021-04-20
Payer: MEDICARE

## 2021-04-20 VITALS — BODY MASS INDEX: 26.11 KG/M2 | WEIGHT: 133 LBS | HEIGHT: 60 IN

## 2021-04-20 DIAGNOSIS — Z78.0 ASYMPTOMATIC AGE-RELATED POSTMENOPAUSAL STATE: ICD-10-CM

## 2021-04-20 DIAGNOSIS — Z12.31 SCREENING MAMMOGRAM, ENCOUNTER FOR: ICD-10-CM

## 2021-04-20 PROBLEM — IMO0001 ASYMMETRICAL HEARING LOSS OF RIGHT EAR: Chronic | Status: ACTIVE | Noted: 2021-04-20

## 2021-04-20 PROCEDURE — 77063 BREAST TOMOSYNTHESIS BI: CPT

## 2021-04-20 PROCEDURE — 77080 DXA BONE DENSITY AXIAL: CPT

## 2021-04-20 PROCEDURE — 77067 SCR MAMMO BI INCL CAD: CPT

## 2021-04-21 ENCOUNTER — TELEPHONE (OUTPATIENT)
Dept: OBGYN CLINIC | Facility: CLINIC | Age: 66
End: 2021-04-21

## 2021-04-21 DIAGNOSIS — M81.0 OSTEOPOROSIS, UNSPECIFIED OSTEOPOROSIS TYPE, UNSPECIFIED PATHOLOGICAL FRACTURE PRESENCE: Primary | ICD-10-CM

## 2021-04-21 DIAGNOSIS — Q78.2 OSTEOPETROSIS: ICD-10-CM

## 2021-04-21 RX ORDER — ALENDRONATE SODIUM 70 MG/1
70 TABLET ORAL
Qty: 4 TABLET | Refills: 6 | Status: SHIPPED | OUTPATIENT
Start: 2021-04-21 | End: 2021-07-15 | Stop reason: SDUPTHER

## 2021-04-21 NOTE — TELEPHONE ENCOUNTER
Spoke to pt and let her know I placed lab order for her and rx and Sig  Reviewed msg from [address]  Pt eats NO dairy and cant tolerate Cristhian  Explained to pt the importance of trying to find other foods containing Vit D and Ca that she can tolerate  She is worried the fosamax will upset her stomach  Advised pt to at least give it a try and see how it goes

## 2021-04-21 NOTE — TELEPHONE ENCOUNTER
Spoke to pt and she says she had never taken Vit D and Ca because it upsets her stomach  She doesn't have any forms of dairy at all  She said she would like to f/u with [facility] in this matter  She said she will do whatever she can to "get the bones better"     ----- Message from Shelia Diaz MD sent at 4/21/2021  1:31 PM EDT -----  Please call Lizzie Cordero - her DEXA has been reveiwed  She has osteoporosis of both the hip and spine  Options: continue with calcium and vit d supplementation ( need to check a vitamin D level to verify adequacy of dose) and consider a medication for ostoporosis  Common medication is fosamox to be taken weekly to help strenghen her bones      If she is interested in starting medications for her bones we can either do this or her PCP

## 2021-04-21 NOTE — TELEPHONE ENCOUNTER
I would almas to order an vitamin D level  And I would like to prescribe fosamax for her 70mg PO weekly  Need to be on an empty stomach  in the morning  Vitamin D supplementation should be at least 1000 units per day if she is not deficient  Calcium - I usually recommend 1200 - is she is unable to take tables/otc supplements then through her diet -yogurt, low fat milk etc  Lots of other foods have dairy - almonds for example   Some leafy greans - spinach, kale, dried figs

## 2021-04-26 ENCOUNTER — APPOINTMENT (OUTPATIENT)
Dept: LAB | Facility: CLINIC | Age: 66
End: 2021-04-26
Payer: MEDICARE

## 2021-04-26 DIAGNOSIS — M81.0 OSTEOPOROSIS, UNSPECIFIED OSTEOPOROSIS TYPE, UNSPECIFIED PATHOLOGICAL FRACTURE PRESENCE: ICD-10-CM

## 2021-04-26 LAB — 25(OH)D3 SERPL-MCNC: 30.6 NG/ML (ref 30–100)

## 2021-04-26 PROCEDURE — 82306 VITAMIN D 25 HYDROXY: CPT

## 2021-04-26 PROCEDURE — 36415 COLL VENOUS BLD VENIPUNCTURE: CPT

## 2021-04-27 ENCOUNTER — DOCUMENTATION (OUTPATIENT)
Dept: NEUROLOGY | Facility: CLINIC | Age: 66
End: 2021-04-27

## 2021-04-27 ENCOUNTER — TELEPHONE (OUTPATIENT)
Dept: OBGYN CLINIC | Facility: CLINIC | Age: 66
End: 2021-04-27

## 2021-04-27 NOTE — PROGRESS NOTES
Type Date User Summary Attachment   General 04/27/2021  2:37 PM Constantine Galdamez care coordination  -   Note    Botox- no authorization needed   Please use our stock      Thank you,     Marietta Parikh

## 2021-04-27 NOTE — PROGRESS NOTES
Patient is scheduled with Dr Clraa Leung on 6/17/2021 in the [facility] location

## 2021-04-27 NOTE — TELEPHONE ENCOUNTER
Spoke to pt and she will start Vit D    ----- Message from Leelee Andino MD sent at 4/27/2021  9:43 AM EDT -----  Vitamin d is low normal   I would like for Kendra Blizzard to take 1000 units per day of Vitamin D

## 2021-05-12 ENCOUNTER — TELEMEDICINE (OUTPATIENT)
Dept: FAMILY MEDICINE CLINIC | Facility: CLINIC | Age: 66
End: 2021-05-12
Payer: MEDICARE

## 2021-05-12 VITALS — WEIGHT: 133 LBS | BODY MASS INDEX: 26.11 KG/M2 | HEIGHT: 60 IN

## 2021-05-12 DIAGNOSIS — Z91.09 ENVIRONMENTAL ALLERGIES: Primary | ICD-10-CM

## 2021-05-12 PROCEDURE — 99213 OFFICE O/P EST LOW 20 MIN: CPT | Performed by: FAMILY MEDICINE

## 2021-05-12 RX ORDER — FEXOFENADINE HCL AND PSEUDOEPHEDRINE HCI 180; 240 MG/1; MG/1
1 TABLET, EXTENDED RELEASE ORAL DAILY
Qty: 30 TABLET | Refills: 1 | Status: SHIPPED | OUTPATIENT
Start: 2021-05-12 | End: 2021-07-15 | Stop reason: ALTCHOICE

## 2021-05-12 RX ORDER — FLUTICASONE PROPIONATE 50 MCG
2 SPRAY, SUSPENSION (ML) NASAL DAILY
Qty: 1 BOTTLE | Refills: 1 | Status: SHIPPED | OUTPATIENT
Start: 2021-05-12 | End: 2021-06-03

## 2021-05-12 NOTE — PROGRESS NOTES
Virtual Regular Visit      Assessment/Plan:    Problem List Items Addressed This Visit        Other    Environmental allergies - Primary    Relevant Medications    fexofenadine-pseudoephedrine (ALLEGRA-D 24) 180-240 MG per 24 hr tablet    fluticasone (FLONASE) 50 mcg/act nasal spray      Advised her to start taking Allegra D 24 hour once a day, use Flonase daily  Avoid allergens if possible  Offered course of prednisone but patient would like to hold off at this time  She will call back in 1-2 weeks if not improving  Reason for visit is   Chief Complaint   Patient presents with    Virtual Regular Visit     voice raspy and ear pain        Encounter provider Fito Tarango MD    Provider located at [place] [address]  [address] [place] [id number]      Recent Visits  No visits were found meeting these conditions  Showing recent visits within past 7 days and meeting all other requirements     Today's Visits  Date Type Provider Dept   05/12/21 Rick Medina MD [facility]   Showing today's visits and meeting all other requirements     Future Appointments  No visits were found meeting these conditions  Showing future appointments within next 150 days and meeting all other requirements        The patient was identified by name and date of birth  Thang Kim was informed that this is a telemedicine visit and that the visit is being conducted through [address] [place] [address] Now and patient was informed that this is a secure, HIPAA-compliant platform  She agrees to proceed     My office door was closed  No one else was in the room  She acknowledged consent and understanding of privacy and security of the video platform  The patient has agreed to participate and understands they can discontinue the visit at any time  Patient is aware this is a billable service  Subjective  Thang Kim is a 72 y o  female          28-year-old female says she is suffering from allergies  She has nasal congestion, postnasal drip causing her to cough, itchy swollen eyes  She is not using anything at this time to treat her symptoms  She is allergic to trees and multiple environmental factors  Her symptoms started approximately 1-2 weeks ago  She denies any fevers, body aches or exposures to COVID-19 or any sick contacts       Past Medical History:   Diagnosis Date    Anxiety     Arthritis     Bradycardia     Depression     Edema     Head injury     Heart murmur     History of echocardiogram 2016    SEH-hypertensive hemodynamic response, LVH, echo portion is negative for ischemia   History of palpitations     Hyperlipidemia     Hypertension     Migraine     PVD (peripheral vascular disease) (HCC)     Shortness of breath        Past Surgical History:   Procedure Laterality Date     SECTION      COLONOSCOPY      [place] ESOPHAGOGASTRODUODENOSCOPY TRANSORAL DIAGNOSTIC N/A 2017    Procedure: EGD AND COLONOSCOPY;  Surgeon: Lore Perez MD;  [facility]; Service: Gastroenterology    [place] [address] ARTHROSCOP,SURG,W/ROTAT CUFF REPR Left 2017    Procedure: SHOULDER ARTHROSCOPY ROTATOR CUFF REPAIR; SUBACROMIAL DECOMPRESSION; BICEPS TENODESIS;  Surgeon: Wilfredo Cyr DO;  [facility];  Service: Orthopedics    TONSILLECTOMY         Current Outpatient Medications   Medication Sig Dispense Refill    alendronate (FOSAMAX) 70 mg tablet Take 1 tablet (70 mg total) by mouth every 7 days Take on empty stomach in the morning   4 tablet 6    butalbital-acetaminophen-caffeine (FIORICET,ESGIC) -40 mg per tablet Take 1 tablet by mouth every 4 (four) hours as needed for headaches 30 tablet 2    furosemide (LASIX) 40 mg tablet Take 1 tablet (40 mg total) by mouth daily 90 tablet 3    hydrALAZINE (APRESOLINE) 50 mg tablet Take 1 tablet (50 mg total) by mouth 2 (two) times a day 180 tablet 3    ondansetron (ZOFRAN-ODT) 8 mg disintegrating tablet TAKE ONE (1) TABLET BY MOUTH EVERY 6 HOURS AS NEEDED      rosuvastatin (CRESTOR) 5 mg tablet Take 1 tablet (5 mg total) by mouth daily 90 tablet 1    ALPRAZolam (XANAX) 0 25 mg tablet Take 1 tablet (0 25 mg total) by mouth 3 (three) times a day as needed for anxiety Usually takes 1/2 tab (Patient not taking: Reported on 4/20/2021) 25 tablet 0    BOTOX 100 units Inject 155 units intramuscularly every 90 days (Patient not taking: Reported on 4/20/2021) 200 Units 2    fexofenadine-pseudoephedrine (ALLEGRA-D 24) 180-240 MG per 24 hr tablet Take 1 tablet by mouth daily 30 tablet 1    fluticasone (FLONASE) 50 mcg/act nasal spray 2 sprays into each nostril daily 1 Bottle 1     No current facility-administered medications for this visit  Allergies   Allergen Reactions    Eggs Or Egg-Derived Products - Food Allergy Anaphylaxis    Flu Virus Vaccine Anaphylaxis    Iodine - Food Allergy Anaphylaxis    Decadron [Dexamethasone] Nausea Only     Any steroid per pt   Advil [Ibuprofen] Swelling     Lips and eyes swelling    Naproxen Swelling     Lips and eyes    Sulfa Antibiotics Swelling       Review of Systems   Constitutional: Positive for chills and fatigue  Negative for fever  HENT: Positive for congestion, postnasal drip and rhinorrhea  Eyes: Positive for redness  Respiratory: Negative for cough, shortness of breath and wheezing  Musculoskeletal: Negative for myalgias  Allergic/Immunologic: Positive for environmental allergies  Video Exam    Vitals:    05/12/21 0804   Weight: 60 3 kg (133 lb)   Height: 5' (1 524 m)       Physical Exam  Vitals signs and nursing note reviewed  Constitutional:       General: She is not in acute distress  Appearance: She is well-developed  She is not diaphoretic  HENT:      Head: Normocephalic and atraumatic  Pulmonary:      Effort: Pulmonary effort is normal  No respiratory distress     Neurological:      Mental Status: She is alert    Psychiatric:         Behavior: Behavior normal          Thought Content: Thought content normal          Judgment: Judgment normal           I spent 7 minutes directly with the patient during this visit      [id number] [facility] acknowledges that she has consented to an online visit or consultation  She understands that the online visit is based solely on information provided by her, and that, in the absence of a face-to-face physical evaluation by the physician, the diagnosis she receives is both limited and provisional in terms of accuracy and completeness  This is not intended to replace a full medical face-to-face evaluation by the physician  Kacey Sees understands and accepts these terms

## 2021-06-03 DIAGNOSIS — Z91.09 ENVIRONMENTAL ALLERGIES: ICD-10-CM

## 2021-06-03 RX ORDER — FLUTICASONE PROPIONATE 50 MCG
SPRAY, SUSPENSION (ML) NASAL
Qty: 16 ML | Refills: 1 | Status: SHIPPED | OUTPATIENT
Start: 2021-06-03 | End: 2021-06-30

## 2021-06-08 ENCOUNTER — OFFICE VISIT (OUTPATIENT)
Dept: CARDIOLOGY CLINIC | Facility: CLINIC | Age: 66
End: 2021-06-08
Payer: MEDICARE

## 2021-06-08 ENCOUNTER — TELEPHONE (OUTPATIENT)
Dept: FAMILY MEDICINE CLINIC | Facility: CLINIC | Age: 66
End: 2021-06-08

## 2021-06-08 VITALS
DIASTOLIC BLOOD PRESSURE: 72 MMHG | HEIGHT: 60 IN | SYSTOLIC BLOOD PRESSURE: 128 MMHG | HEART RATE: 60 BPM | WEIGHT: 125 LBS | BODY MASS INDEX: 24.54 KG/M2

## 2021-06-08 DIAGNOSIS — E78.2 MIXED HYPERLIPIDEMIA: ICD-10-CM

## 2021-06-08 DIAGNOSIS — E78.2 MIXED HYPERLIPIDEMIA: Primary | ICD-10-CM

## 2021-06-08 PROCEDURE — 99213 OFFICE O/P EST LOW 20 MIN: CPT | Performed by: PHYSICIAN ASSISTANT

## 2021-06-08 RX ORDER — OMEGA-3-ACID ETHYL ESTERS 1 G/1
2 CAPSULE, LIQUID FILLED ORAL 2 TIMES DAILY
Qty: 120 CAPSULE | Refills: 3 | Status: SHIPPED | OUTPATIENT
Start: 2021-06-08 | End: 2021-06-08

## 2021-06-08 RX ORDER — OMEGA-3-ACID ETHYL ESTERS 1 G/1
2 CAPSULE, LIQUID FILLED ORAL 2 TIMES DAILY
Qty: 120 CAPSULE | Refills: 3 | Status: SHIPPED | OUTPATIENT
Start: 2021-06-08 | End: 2021-07-15 | Stop reason: ALTCHOICE

## 2021-06-08 NOTE — TELEPHONE ENCOUNTER
Pt saw her cardiologist today and they told her not to do statins  Pt gets swelling from statins  The cardiologist suggested Beola August but pt insurance doesn't cover it

## 2021-06-08 NOTE — PROGRESS NOTES
[facility]   Outpatient Note  Feliz Gil  1955  [id number]  [facility], [facility] [address]  [place] [address]  [facility] [address]    Feliz Gil is a 77 y o  female    Assessment and Plan:   Mixed hyperlipidemia  Not tolerating any statins  Will try Lovaza for lipid control     Hypertension  Controlled on current medical regimen   Continue current medications     Anxiety  Contributing to HTN     Additional Plan:   No Medication changes made or testing ordered today  Available lab and test results are reviewed with the patient as noted  Return visit will be in six months or earlier if there are problems  The patient is encouraged to call in the meantime if there are questions or concerns  Subjective: The patient is seen in follow up today  She has edema, HTN and HLD  Her tests are reviewed and are all normal  Her edema is improving and her BP is much improved  Chest pain has resolved now that BP is under control  From a cardiac perspective, she is doing well without complaints of chest pain or exertional dyspnea  She has no palpitations syncope or near syncope, and denies orthopnea or PND  She does not complain of TIA or CVA symptoms  Her only complaint is ringing in the ears  she has seen ENT and they are not concerned as hearing is intact  Hyperlipidemia  Pertinent negatives include no chest pain or shortness of breath         Social History  Social History     Tobacco Use   Smoking Status Former Smoker    Quit date: 200    Years since quittin 4   Smokeless Tobacco Never Used   ,   Social History     Substance and Sexual Activity   Alcohol Use No   ,   Social History     Substance and Sexual Activity   Drug Use No     Family History   Problem Relation Age of Onset    Migraines Mother     Aortic aneurysm Mother     Heart disease Mother     Aneurysm Father         brain stem    Heart disease Father     Hypertension Father     No Known Problems Daughter     No Known Problems Maternal Grandmother     No Known Problems Paternal Grandmother     No Known Problems Paternal Aunt     No Known Problems Paternal Aunt     No Known Problems Paternal Aunt     No Known Problems Paternal Aunt     Multiple sclerosis Brother     Heart disease Maternal Uncle     Heart attack Maternal Uncle     Heart disease Maternal Uncle     Heart disease Maternal Uncle     Heart disease Maternal Uncle     Heart disease Maternal Uncle     Breast cancer Neg Hx        Medical and Surgical History  Past Medical History:   Diagnosis Date    Anxiety     Arthritis     Bradycardia     Depression     Edema     Head injury     Heart murmur     History of echocardiogram 2016    SEH-hypertensive hemodynamic response, LVH, echo portion is negative for ischemia   History of palpitations     Hyperlipidemia     Hypertension     Migraine     PVD [facility]peripheral vascular disease) ([facility])     Shortness of breath      Past Surgical History:   Procedure Laterality Date     SECTION      COLONOSCOPY      [place] ESOPHAGOGASTRODUODENOSCOPY TRANSORAL DIAGNOSTIC N/A 2017    Procedure: EGD AND COLONOSCOPY;  Surgeon: Suad Baldwin MD;  [facility];   Service: Gastroenterology    [place] SHLDR ARTHROSCOP,SURG,W/ROTAT CUFF REPR Left 2017    Procedure: SHOULDER ARTHROSCOPY ROTATOR CUFF REPAIR; SUBACROMIAL DECOMPRESSION; BICEPS TENODESIS;  Surgeon: Devora Sage DO;  [facility];  Service: Orthopedics    TONSILLECTOMY           Current Outpatient Medications:     alendronate (FOSAMAX) 70 mg tablet, Take 1 tablet (70 mg total) by mouth every 7 days Take on empty stomach in the morning , Disp: 4 tablet, Rfl: 6    ALPRAZolam (XANAX) 0 25 mg tablet, Take 1 tablet (0 25 mg total) by mouth 3 (three) times a day as needed for anxiety Usually takes 1/2 tab, Disp: 25 tablet, Rfl: 0   BOTOX 100 units, Inject 155 units intramuscularly every 90 days, Disp: 200 Units, Rfl: 2    butalbital-acetaminophen-caffeine (FIORICET,ESGIC) -40 mg per tablet, Take 1 tablet by mouth every 4 (four) hours as needed for headaches, Disp: 30 tablet, Rfl: 2    fluticasone (FLONASE) 50 mcg/act nasal spray, SPRAY 2 SPRAYS INTO EACH NOSTRIL EVERY DAY, Disp: 16 mL, Rfl: 1    furosemide (LASIX) 40 mg tablet, Take 1 tablet (40 mg total) by mouth daily, Disp: 90 tablet, Rfl: 3    hydrALAZINE (APRESOLINE) 50 mg tablet, Take 1 tablet (50 mg total) by mouth 2 (two) times a day, Disp: 180 tablet, Rfl: 3    ondansetron (ZOFRAN-ODT) 8 mg disintegrating tablet, TAKE ONE (1) TABLET BY MOUTH EVERY 6 HOURS AS NEEDED, Disp: , Rfl:     fexofenadine-pseudoephedrine (ALLEGRA-D 24) 180-240 MG per 24 hr tablet, Take 1 tablet by mouth daily, Disp: 30 tablet, Rfl: 1    omega-3-acid ethyl esters (LOVAZA) 1 g capsule, Take 2 capsules (2 g total) by mouth 2 (two) times a day, Disp: 120 capsule, Rfl: 3    rosuvastatin (CRESTOR) 5 mg tablet, Take 1 tablet (5 mg total) by mouth daily (Patient not taking: Reported on 6/8/2021), Disp: 90 tablet, Rfl: 1  Allergies   Allergen Reactions    Eggs Or Egg-Derived Products - Food Allergy Anaphylaxis    Flu Virus Vaccine Anaphylaxis    Iodine - Food Allergy Anaphylaxis    Decadron [Dexamethasone] Nausea Only     Any steroid per pt   Advil [Ibuprofen] Swelling     Lips and eyes swelling    Naproxen Swelling     Lips and eyes    Pravastatin Myalgia    Rosuvastatin Myalgia    Sulfa Antibiotics Swelling       Review of Systems   Constitution: Negative  HENT: Positive for tinnitus  Eyes: Negative  Cardiovascular: Positive for leg swelling (thighs but not legs)  Negative for chest pain, claudication, cyanosis, dyspnea on exertion, irregular heartbeat, near-syncope, orthopnea, palpitations, paroxysmal nocturnal dyspnea and syncope  Respiratory: Negative    Negative for cough, hemoptysis, shortness of breath, sleep disturbances due to breathing, snoring, sputum production and wheezing  Endocrine: Negative  Hematologic/Lymphatic: Negative  Skin: Negative  Negative for rash (pruritic rash to face, neck and chest wall  )  Musculoskeletal: Negative  Gastrointestinal: Negative  Genitourinary: Negative  Neurological: Negative  Psychiatric/Behavioral: Negative  Allergic/Immunologic: Negative  Objective:   /72   Pulse 60   Ht 5' (1 524 m)   Wt 56 7 kg (125 lb)   LMP  (LMP Unknown)   BMI 24 41 kg/m²   Physical Exam   Constitutional: She is oriented to person, place, and time  She appears well-developed and well-nourished  HENT:   Head: Normocephalic and atraumatic  Mouth/Throat: Oropharynx is clear and moist    Eyes: Conjunctivae and EOM are normal  No scleral icterus  Neck: Normal range of motion  Neck supple  No JVD present  No tracheal deviation present  Cardiovascular: Normal rate, regular rhythm, normal heart sounds and intact distal pulses  Exam reveals no gallop and no friction rub  No murmur heard  Pulmonary/Chest: Effort normal and breath sounds normal  No respiratory distress  She has no wheezes  She has no rales  She exhibits no tenderness  Abdominal: Soft  Bowel sounds are normal  She exhibits no distension  There is no abdominal tenderness  Aorta not palpable    Musculoskeletal: Normal range of motion  General: Edema (mild ) present  No tenderness  Right shoulder: She exhibits swelling (bilateral thighs)  Comments: Thighs are warm palpation   Neurological: She is alert and oriented to person, place, and time  Skin: Skin is warm and dry  No rash noted  No erythema  No pallor  papillomacular rash face, neck and chest wall   Psychiatric: She has a normal mood and affect  Her behavior is normal    Nursing note and vitals reviewed        Lab Review:   No results found for: CHOL  Lab Results   Component Value Date    HDL 75 04/09/2021     Lab Results   Component Value Date    LDLCALC 146 (H) 04/09/2021     Lab Results   Component Value Date    TRIG 58 04/09/2021     Results Reviewed     None        Results Reviewed     None        Results Reviewed     None          Recent Cardiovascular Testing:   Echo 1-0-1667: Systolic function was normal  Ejection fraction was estimated to be 60 %  There were no regional wall motion abnormalities  There was mild concentric hypertrophy      Nuclear stress: 1-: Normal study EF 79%    Vascular studies : Normal Bilateral LE      ECG Review:   NSR

## 2021-06-08 NOTE — TELEPHONE ENCOUNTER
Pt called to report that she discontinued Crestor 24 hours ago  Pt stated she was having multiple side effects from the medication- diarrhea, upset stomach, hip/knee pain, and leg swelling  Please advise, thank you

## 2021-06-17 ENCOUNTER — PROCEDURE VISIT (OUTPATIENT)
Dept: NEUROLOGY | Facility: CLINIC | Age: 66
End: 2021-06-17
Payer: MEDICARE

## 2021-06-17 VITALS — DIASTOLIC BLOOD PRESSURE: 72 MMHG | TEMPERATURE: 99.8 F | SYSTOLIC BLOOD PRESSURE: 108 MMHG

## 2021-06-17 DIAGNOSIS — G43.719 INTRACTABLE CHRONIC MIGRAINE WITHOUT AURA AND WITHOUT STATUS MIGRAINOSUS: Primary | ICD-10-CM

## 2021-06-17 PROCEDURE — 64615 CHEMODENERV MUSC MIGRAINE: CPT | Performed by: PSYCHIATRY & NEUROLOGY

## 2021-06-17 RX ORDER — BUTALBITAL, ACETAMINOPHEN AND CAFFEINE 50; 325; 40 MG/1; MG/1; MG/1
1 TABLET ORAL EVERY 4 HOURS PRN
Qty: 30 TABLET | Refills: 2 | Status: SHIPPED | OUTPATIENT
Start: 2021-06-17 | End: 2021-08-20 | Stop reason: SDUPTHER

## 2021-06-17 NOTE — PROGRESS NOTES
Chemodenervation     Date/Time 6/17/2021 11:31 AM     Performed by  Fabian Abreu MD     Authorized by Fabian Abreu MD      Post-procedure details      Chemodenervation:  Chronic migraine    Facial Nerve Location[de-identified]  Bilateral facial nerve     Chronic migraine headache      Two 100 unit vials of Botox both from lot number  [id number] with an expiration  December 2023 were utilized and diluted with 2 cc of normal saline to produce a 50 unit/cc dilution       Utilizing to aseptic technique with 30-gauge needle the following musdes were infiltrated with Botox:      5 Units - Procerius   5 Units -  R/L   5 Units - 2 Sites Each Side Frontalis R/L    5 Units - 4 Sites Each Side Temporalis R/L   5 Units - 3 Sites Each Side Occipitals R/L  5 Units - 2 Sites Each Side Cervical Parasp R/L   5 Units - 3 Sites Each Side Trapezius R/L      Total injected 155 Units  Total wasted   45 units     The patient tolerated the procedure well   There were no complications   Any minimal bleeding was controlled with compression  Patient will follow up in two weeks and anticipate repeating Botox in 3 months

## 2021-06-30 ENCOUNTER — OFFICE VISIT (OUTPATIENT)
Dept: URGENT CARE | Facility: CLINIC | Age: 66
End: 2021-06-30
Payer: MEDICARE

## 2021-06-30 VITALS
SYSTOLIC BLOOD PRESSURE: 142 MMHG | DIASTOLIC BLOOD PRESSURE: 80 MMHG | BODY MASS INDEX: 26.17 KG/M2 | TEMPERATURE: 97.4 F | WEIGHT: 134 LBS | OXYGEN SATURATION: 97 % | HEART RATE: 76 BPM | RESPIRATION RATE: 16 BRPM

## 2021-06-30 DIAGNOSIS — Z91.09 ENVIRONMENTAL ALLERGIES: ICD-10-CM

## 2021-06-30 DIAGNOSIS — M79.605 LEFT LEG PAIN: Primary | ICD-10-CM

## 2021-06-30 PROCEDURE — G0463 HOSPITAL OUTPT CLINIC VISIT: HCPCS | Performed by: PHYSICIAN ASSISTANT

## 2021-06-30 PROCEDURE — 99213 OFFICE O/P EST LOW 20 MIN: CPT | Performed by: PHYSICIAN ASSISTANT

## 2021-06-30 RX ORDER — ACETAMINOPHEN 500 MG
500 TABLET ORAL EVERY 6 HOURS PRN
Qty: 20 TABLET | Refills: 0 | Status: SHIPPED | OUTPATIENT
Start: 2021-06-30 | End: 2021-07-05

## 2021-06-30 RX ORDER — FLUTICASONE PROPIONATE 50 MCG
SPRAY, SUSPENSION (ML) NASAL
Qty: 16 ML | Refills: 1 | Status: SHIPPED | OUTPATIENT
Start: 2021-06-30 | End: 2021-07-01

## 2021-06-30 RX ORDER — TIZANIDINE HYDROCHLORIDE 2 MG/1
2 CAPSULE, GELATIN COATED ORAL 3 TIMES DAILY
Qty: 12 CAPSULE | Refills: 0 | Status: SHIPPED | OUTPATIENT
Start: 2021-06-30 | End: 2021-07-15

## 2021-06-30 NOTE — PROGRESS NOTES
[facility]        NAME: Antonella Cannon is a 77 y o  female  : 1955    MRN: [id number]  DATE: 2021  TIME: 4:06 PM    Assessment and Plan   Left leg pain [M79 605]  1  Left leg pain  acetaminophen (TYLENOL) 500 mg tablet    TiZANidine (Zanaflex) 2 MG capsule         Patient Instructions   Patient Instructions   Ice for 48 hours   Heat in 48 hours   Muscle relaxer and tylenol as needed         Follow up with PCP in 3-5 days  Proceed to  ER if symptoms worsen  Chief Complaint     Chief Complaint   Patient presents with    Leg Pain     Pt states she slipped and fell at the grocery store this morning and landed on her L buttocks  Having L buttocks and posterior thigh pain with L calf tingling         History of Present Illness       The pt is a 68-year-old female presenting after a fall this morning  She was in the grocery store when she fell on her L buttock  She is now having pain in the L buttock and posterior thigh pain with L calf tingling  Review of Systems   Review of Systems   Constitutional: Negative for activity change and appetite change  Musculoskeletal: Positive for myalgias (Left buttock and left calf pain)  Negative for gait problem, neck pain and neck stiffness  Skin: Negative for color change           Current Medications       Current Outpatient Medications:     alendronate (FOSAMAX) 70 mg tablet, Take 1 tablet (70 mg total) by mouth every 7 days Take on empty stomach in the morning , Disp: 4 tablet, Rfl: 6    ALPRAZolam (XANAX) 0 25 mg tablet, Take 1 tablet (0 25 mg total) by mouth 3 (three) times a day as needed for anxiety Usually takes 1/2 tab, Disp: 25 tablet, Rfl: 0    BOTOX 100 units, Inject 155 units intramuscularly every 90 days, Disp: 200 Units, Rfl: 2    butalbital-acetaminophen-caffeine (FIORICET,ESGIC) -40 mg per tablet, Take 1 tablet by mouth every 4 (four) hours as needed for headaches, Disp: 30 tablet, Rfl: 2    fluticasone (FLONASE) 50 mcg/act nasal spray, SPRAY 2 SPRAYS INTO EACH NOSTRIL EVERY DAY, Disp: 16 mL, Rfl: 1    furosemide (LASIX) 40 mg tablet, Take 1 tablet (40 mg total) by mouth daily, Disp: 90 tablet, Rfl: 3    hydrALAZINE (APRESOLINE) 50 mg tablet, Take 1 tablet (50 mg total) by mouth 2 (two) times a day, Disp: 180 tablet, Rfl: 3    omega-3-acid ethyl esters (LOVAZA) 1 g capsule, TAKE 2 CAPSULES (2 G TOTAL) BY MOUTH 2 (TWO) TIMES A DAY, Disp: 120 capsule, Rfl: 3    ondansetron (ZOFRAN-ODT) 8 mg disintegrating tablet, TAKE ONE (1) TABLET BY MOUTH EVERY 6 HOURS AS NEEDED, Disp: , Rfl:     acetaminophen (TYLENOL) 500 mg tablet, Take 1 tablet (500 mg total) by mouth every 6 (six) hours as needed for mild pain for up to 5 days, Disp: 20 tablet, Rfl: 0    fexofenadine-pseudoephedrine (ALLEGRA-D 24) 180-240 MG per 24 hr tablet, Take 1 tablet by mouth daily, Disp: 30 tablet, Rfl: 1    rosuvastatin (CRESTOR) 5 mg tablet, Take 1 tablet (5 mg total) by mouth daily (Patient not taking: Reported on 6/8/2021), Disp: 90 tablet, Rfl: 1    TiZANidine (Zanaflex) 2 MG capsule, Take 1 capsule (2 mg total) by mouth 3 (three) times a day for 4 days, Disp: 12 capsule, Rfl: 0    Current Allergies     Allergies as of 06/30/2021 - Reviewed 06/30/2021   Allergen Reaction Noted    Eggs or egg-derived products - food allergy Anaphylaxis 05/08/2017    Flu virus vaccine Anaphylaxis 11/09/2020    Iodine - food allergy Anaphylaxis 05/08/2017    Decadron [dexamethasone] Nausea Only 08/09/2019    Advil [ibuprofen] Swelling 05/08/2017    Naproxen Swelling 05/08/2017    Pravastatin Myalgia 06/08/2021    Rosuvastatin Myalgia 06/08/2021    Sulfa antibiotics Swelling 04/05/2021            The following portions of the patient's history were reviewed and updated as appropriate: allergies, current medications, past family history, past medical history, past social history, past surgical history and problem list      Past Medical History:   Diagnosis Date    Anxiety     Arthritis     Bradycardia     Depression     Edema     Head injury     Heart murmur     History of echocardiogram 2016    SEH-hypertensive hemodynamic response, LVH, echo portion is negative for ischemia   History of palpitations     Hyperlipidemia     Hypertension     Migraine     PVD (peripheral vascular disease) (HCC)     Shortness of breath        Past Surgical History:   Procedure Laterality Date     SECTION      COLONOSCOPY      [place] ESOPHAGOGASTRODUODENOSCOPY TRANSORAL DIAGNOSTIC N/A 2017    Procedure: EGD AND COLONOSCOPY;  Surgeon: Humaira Dove MD;  [facility]; Service: Gastroenterology    [place] SHLDR ARTHROSCOP,SURG,W/ROTAT CUFF REPR Left 2017    Procedure: SHOULDER ARTHROSCOPY ROTATOR CUFF REPAIR; SUBACROMIAL DECOMPRESSION; BICEPS TENODESIS;  Surgeon: Bryan Sanchez DO;  [facility];  Service: Orthopedics    TONSILLECTOMY         Family History   Problem Relation Age of Onset    Migraines Mother     Aortic aneurysm Mother     Heart disease Mother     Aneurysm Father         brain stem    Heart disease Father     Hypertension Father     No Known Problems Daughter     No Known Problems Maternal Grandmother     No Known Problems Paternal Grandmother     No Known Problems Paternal Aunt     No Known Problems Paternal Aunt     No Known Problems Paternal Aunt     No Known Problems Paternal Aunt     Multiple sclerosis Brother     Heart disease Maternal Uncle     Heart attack Maternal Uncle     Heart disease Maternal Uncle     Heart disease Maternal Uncle     Heart disease Maternal Uncle     Heart disease Maternal Uncle     Breast cancer Neg Hx          Medications have been verified          Objective   /80   Pulse 76   Temp (!) 97 4 °F (36 3 °C) (Temporal)   Resp 16   Wt 60 8 kg (134 lb)   LMP  (LMP Unknown)   SpO2 97%   BMI 26 17 kg/m²        Physical Exam     Physical Exam  Constitutional:       General: She is not in acute distress  Appearance: Normal appearance  She is normal weight  She is not ill-appearing, toxic-appearing or diaphoretic  HENT:      Head: Normocephalic and atraumatic  Cardiovascular:      Rate and Rhythm: Normal rate and regular rhythm  Heart sounds: Normal heart sounds  No murmur heard  No friction rub  No gallop  Pulmonary:      Effort: Pulmonary effort is normal  No respiratory distress  Breath sounds: Normal breath sounds  No stridor  No wheezing, rhonchi or rales  Chest:      Chest wall: No tenderness  Musculoskeletal:         General: No swelling, tenderness, deformity or signs of injury  Normal range of motion  Skin:     General: Skin is warm and dry  Capillary Refill: Capillary refill takes less than 2 seconds  Neurological:      Mental Status: She is alert

## 2021-07-02 ENCOUNTER — OFFICE VISIT (OUTPATIENT)
Dept: NEUROLOGY | Facility: CLINIC | Age: 66
End: 2021-07-02
Payer: MEDICARE

## 2021-07-02 ENCOUNTER — APPOINTMENT (OUTPATIENT)
Dept: LAB | Facility: CLINIC | Age: 66
End: 2021-07-02
Payer: MEDICARE

## 2021-07-02 VITALS
HEART RATE: 61 BPM | BODY MASS INDEX: 26.31 KG/M2 | HEIGHT: 60 IN | DIASTOLIC BLOOD PRESSURE: 78 MMHG | SYSTOLIC BLOOD PRESSURE: 120 MMHG | WEIGHT: 134 LBS

## 2021-07-02 DIAGNOSIS — E78.2 MIXED HYPERLIPIDEMIA: ICD-10-CM

## 2021-07-02 DIAGNOSIS — G43.719 INTRACTABLE CHRONIC MIGRAINE WITHOUT AURA AND WITHOUT STATUS MIGRAINOSUS: Primary | ICD-10-CM

## 2021-07-02 LAB
ALBUMIN SERPL BCP-MCNC: 3.6 G/DL (ref 3.5–5)
ALP SERPL-CCNC: 65 U/L (ref 46–116)
ALT SERPL W P-5'-P-CCNC: 31 U/L (ref 12–78)
ANION GAP SERPL CALCULATED.3IONS-SCNC: 5 MMOL/L (ref 4–13)
AST SERPL W P-5'-P-CCNC: 37 U/L (ref 5–45)
BILIRUB SERPL-MCNC: 0.4 MG/DL (ref 0.2–1)
BUN SERPL-MCNC: 12 MG/DL (ref 5–25)
CALCIUM SERPL-MCNC: 8.9 MG/DL (ref 8.3–10.1)
CHLORIDE SERPL-SCNC: 106 MMOL/L (ref 100–108)
CHOLEST SERPL-MCNC: 215 MG/DL (ref 50–200)
CO2 SERPL-SCNC: 30 MMOL/L (ref 21–32)
CREAT SERPL-MCNC: 0.75 MG/DL (ref 0.6–1.3)
GFR SERPL CREATININE-BSD FRML MDRD: 83 ML/MIN/1.73SQ M
GLUCOSE P FAST SERPL-MCNC: 83 MG/DL (ref 65–99)
HDLC SERPL-MCNC: 84 MG/DL
LDLC SERPL CALC-MCNC: 122 MG/DL (ref 0–100)
POTASSIUM SERPL-SCNC: 3.7 MMOL/L (ref 3.5–5.3)
PROT SERPL-MCNC: 6.9 G/DL (ref 6.4–8.2)
SODIUM SERPL-SCNC: 141 MMOL/L (ref 136–145)
TRIGL SERPL-MCNC: 46 MG/DL

## 2021-07-02 PROCEDURE — 80061 LIPID PANEL: CPT

## 2021-07-02 PROCEDURE — 99213 OFFICE O/P EST LOW 20 MIN: CPT | Performed by: PSYCHIATRY & NEUROLOGY

## 2021-07-02 PROCEDURE — 80053 COMPREHEN METABOLIC PANEL: CPT

## 2021-07-02 PROCEDURE — 36415 COLL VENOUS BLD VENIPUNCTURE: CPT

## 2021-07-02 RX ORDER — OMEGA-3/DHA/EPA/FISH OIL 60 MG-90MG
CAPSULE ORAL 2 TIMES DAILY
COMMUNITY

## 2021-07-02 NOTE — PROGRESS NOTES
Marcelle Houser is a 77 y o  female  Returns in follow-up today with history of migraine headache    Assessment:  1  Intractable chronic migraine without aura and without status migrainosus        Plan:   repeat Botox in September   Fioricet as needed   Follow-up afterwards    Discussion:   Ferdinand Evans reports a definite improvement in her migraine headache severity and frequency with this round of Botox  She did have some dysesthesias in the distribution of the  superior orbital nerve distribution after injections but these have resolved  She finds Fioricet effective in stopping her headache which she takes less than 3 times a week  I will see her back in follow-up following her next Botox in September      Subjective:    HPI   Ferdinand Evans returns in follow-up today  She reports that since here last she has been doing well with respect to her migraine headaches  She states that with the last Botox injection she has noticed a definite improvement in the headache frequency and severity  She states she is no longer disabled when she gets a migraine headache  She does take Fioricet for headaches and she states she usually takes it once or twice a week at most 3 times  It works effectively  She did report some dysesthesias following this injection radiating from her eyebrows up to the top of her head but this is subsequently resolved  Past Medical History:   Diagnosis Date    Anxiety     Arthritis     Bradycardia     Depression     Edema     Head injury     Heart murmur     History of echocardiogram 07/18/2016    SEH-hypertensive hemodynamic response, LVH, echo portion is negative for ischemia      History of palpitations     Hyperlipidemia     Hypertension     Migraine     PVD (peripheral vascular disease) (HCC)     Shortness of breath        Family History:  Family History   Problem Relation Age of Onset   Shirin Leach Migraines Mother     Aortic aneurysm Mother     Heart disease Mother     Aneurysm Father brain stem    Heart disease Father     Hypertension Father     No Known Problems Daughter     No Known Problems Maternal Grandmother     No Known Problems Paternal Grandmother     No Known Problems Paternal Aunt     No Known Problems Paternal Aunt     No Known Problems Paternal Aunt     No Known Problems Paternal Aunt     Multiple sclerosis Brother     Heart disease Maternal Uncle     Heart attack Maternal Uncle     Heart disease Maternal Uncle     Heart disease Maternal Uncle     Heart disease Maternal Uncle     Heart disease Maternal Uncle     Breast cancer Neg Hx        Past Surgical History:  Past Surgical History:   Procedure Laterality Date     SECTION      COLONOSCOPY      [place] ESOPHAGOGASTRODUODENOSCOPY TRANSORAL DIAGNOSTIC N/A 2017    Procedure: EGD AND COLONOSCOPY;  Surgeon: Rafiq Kohli MD;  [facility]; Service: Gastroenterology    [place] [address] ARTHROSCOP,SURG,W/ROTAT CUFF REPR Left 2017    Procedure: SHOULDER ARTHROSCOPY ROTATOR CUFF REPAIR; SUBACROMIAL DECOMPRESSION; BICEPS TENODESIS;  Surgeon: Dinah Lanes, DO;  [facility];  Service: Orthopedics    TONSILLECTOMY         Social History:   reports that she quit smoking about 31 years ago  She has never used smokeless tobacco  She reports previous alcohol use  She reports that she does not use drugs      Allergies:  Eggs or egg-derived products - food allergy, Flu virus vaccine, Iodine - food allergy, Decadron [dexamethasone], Advil [ibuprofen], Naproxen, Pravastatin, Rosuvastatin, and Sulfa antibiotics      Current Outpatient Medications:     acetaminophen (TYLENOL) 500 mg tablet, Take 1 tablet (500 mg total) by mouth every 6 (six) hours as needed for mild pain for up to 5 days, Disp: 20 tablet, Rfl: 0    alendronate (FOSAMAX) 70 mg tablet, Take 1 tablet (70 mg total) by mouth every 7 days Take on empty stomach in the morning , Disp: 4 tablet, Rfl: 6    ALPRAZolam (XANAX) 0 25 mg tablet, Take 1 tablet (0 25 mg total) by mouth 3 (three) times a day as needed for anxiety Usually takes 1/2 tab, Disp: 25 tablet, Rfl: 0    BOTOX 100 units, Inject 155 units intramuscularly every 90 days, Disp: 200 Units, Rfl: 2    butalbital-acetaminophen-caffeine (FIORICET,ESGIC) -40 mg per tablet, Take 1 tablet by mouth every 4 (four) hours as needed for headaches, Disp: 30 tablet, Rfl: 2    fluticasone (FLONASE) 50 mcg/act nasal spray, SPRAY 2 SPRAYS INTO EACH NOSTRIL EVERY DAY (Patient taking differently: 1 spray into each nostril daily as needed ), Disp: 48 mL, Rfl: 1    furosemide (LASIX) 40 mg tablet, Take 1 tablet (40 mg total) by mouth daily, Disp: 90 tablet, Rfl: 3    hydrALAZINE (APRESOLINE) 50 mg tablet, Take 1 tablet (50 mg total) by mouth 2 (two) times a day, Disp: 180 tablet, Rfl: 3    Omega-3 Fatty Acids (Fish Oil) 500 MG CAPS, Take by mouth 2 (two) times a day 2 Capsules, Disp: , Rfl:     ondansetron (ZOFRAN-ODT) 8 mg disintegrating tablet, TAKE ONE (1) TABLET BY MOUTH EVERY 6 HOURS AS NEEDED, Disp: , Rfl:     TiZANidine (Zanaflex) 2 MG capsule, Take 1 capsule (2 mg total) by mouth 3 (three) times a day for 4 days, Disp: 12 capsule, Rfl: 0    fexofenadine-pseudoephedrine (ALLEGRA-D 24) 180-240 MG per 24 hr tablet, Take 1 tablet by mouth daily, Disp: 30 tablet, Rfl: 1    omega-3-acid ethyl esters (LOVAZA) 1 g capsule, TAKE 2 CAPSULES (2 G TOTAL) BY MOUTH 2 (TWO) TIMES A DAY (Patient not taking: Reported on 7/2/2021), Disp: 120 capsule, Rfl: 3    rosuvastatin (CRESTOR) 5 mg tablet, Take 1 tablet (5 mg total) by mouth daily (Patient not taking: Reported on 6/8/2021), Disp: 90 tablet, Rfl: 1      I have reviewed the past medical, social and family history, current medications, allergies, vitals, review of systems and updated this information as appropriate today     Objective:    Vitals:  Blood pressure 120/78, pulse 61, height 5' (1 524 m), weight 60 8 kg (134 lb), not currently breastfeeding  Physical Exam    Neurological Exam   GENERAL:  Well-developed well-nourished woman in no acute distress  HEENT/NECK: Head is atraumatic normocephalic, neck is supple  NEUROLOGIC:  Mental Status: Awake and alert without aphasia  Cranial Nerves: Extraocular movements are full  Face is symmetrical  Coordination:  Gait is stable            ROS:    Review of Systems   Constitutional: Negative for appetite change, fatigue and fever  HENT: Positive for tinnitus  Negative for drooling, ear pain, trouble swallowing and voice change  Eyes: Positive for photophobia, pain and visual disturbance (Blurry vision, blank spots)  Respiratory: Negative for chest tightness and shortness of breath  Cardiovascular: Negative for chest pain, palpitations and leg swelling  Gastrointestinal: Negative for abdominal pain, constipation, diarrhea, nausea and vomiting  Endocrine: Negative for cold intolerance and heat intolerance  Genitourinary: Negative for difficulty urinating, frequency and urgency  Musculoskeletal: Negative for back pain, gait problem, joint swelling, myalgias and neck pain  Skin: Negative for rash  Neurological: Positive for headaches  Negative for dizziness, tremors, seizures, syncope, facial asymmetry, speech difficulty, weakness, light-headedness and numbness  Psychiatric/Behavioral: Negative for agitation, behavioral problems, confusion, decreased concentration, dysphoric mood and sleep disturbance  The patient is not nervous/anxious and is not hyperactive

## 2021-07-15 ENCOUNTER — OFFICE VISIT (OUTPATIENT)
Dept: FAMILY MEDICINE CLINIC | Facility: CLINIC | Age: 66
End: 2021-07-15
Payer: MEDICARE

## 2021-07-15 VITALS
BODY MASS INDEX: 26.11 KG/M2 | TEMPERATURE: 96.6 F | WEIGHT: 133 LBS | HEIGHT: 60 IN | DIASTOLIC BLOOD PRESSURE: 80 MMHG | SYSTOLIC BLOOD PRESSURE: 122 MMHG | OXYGEN SATURATION: 99 % | HEART RATE: 61 BPM

## 2021-07-15 DIAGNOSIS — I10 ESSENTIAL HYPERTENSION: ICD-10-CM

## 2021-07-15 DIAGNOSIS — M81.0 OSTEOPOROSIS, UNSPECIFIED OSTEOPOROSIS TYPE, UNSPECIFIED PATHOLOGICAL FRACTURE PRESENCE: ICD-10-CM

## 2021-07-15 DIAGNOSIS — Z91.09 ENVIRONMENTAL ALLERGIES: Primary | ICD-10-CM

## 2021-07-15 DIAGNOSIS — F41.9 ANXIETY: ICD-10-CM

## 2021-07-15 DIAGNOSIS — E78.2 MIXED HYPERLIPIDEMIA: ICD-10-CM

## 2021-07-15 PROCEDURE — 99214 OFFICE O/P EST MOD 30 MIN: CPT | Performed by: FAMILY MEDICINE

## 2021-07-15 RX ORDER — ALENDRONATE SODIUM 70 MG/1
70 TABLET ORAL
Qty: 4 TABLET | Refills: 6 | Status: SHIPPED | OUTPATIENT
Start: 2021-07-15 | End: 2021-08-30 | Stop reason: SDUPTHER

## 2021-07-15 RX ORDER — FLUTICASONE PROPIONATE 50 MCG
1 SPRAY, SUSPENSION (ML) NASAL DAILY
Qty: 16 G | Refills: 1 | Status: SHIPPED | OUTPATIENT
Start: 2021-07-15

## 2021-07-15 RX ORDER — ALPRAZOLAM 0.25 MG/1
0.25 TABLET ORAL 3 TIMES DAILY PRN
Qty: 25 TABLET | Refills: 0 | Status: SHIPPED | OUTPATIENT
Start: 2021-07-15 | End: 2021-08-30 | Stop reason: SDUPTHER

## 2021-07-15 NOTE — PROGRESS NOTES
Assessment/Plan:       Problem List Items Addressed This Visit        Cardiovascular and Mediastinum    Hypertension       Other    Anxiety    Relevant Medications    ALPRAZolam (XANAX) 0 25 mg tablet    Mixed hyperlipidemia    Environmental allergies - Primary    Relevant Medications    fluticasone (FLONASE) 50 mcg/act nasal spray      Other Visit Diagnoses     Osteoporosis, unspecified osteoporosis type, unspecified pathological fracture presence        Relevant Medications    alendronate (FOSAMAX) 70 mg tablet          Refilled medications  Small amount of Xanax renewed - uses sparingly  Reviewed PDMP which is consistent  She will be establishing with a new provider in [place]  Subjective:      Patient ID: Ivy Navarrete is a 77 y o  female  77year old has hyperlipidemia - stopped Crestor due to pain in her legs  She had labs done and cholesterol improved with fish oil  Her cardiologist tried to get her Vascepa and lovaza - was not covered    Lab Results       Component                Value               Date                       CHOLESTEROL              215 (H)             07/02/2021                 CHOLESTEROL              233 (H)             04/09/2021                 CHOLESTEROL              229 (H)             12/04/2020            Lab Results       Component                Value               Date                       HDL                      84                  07/02/2021                 HDL                      75                  04/09/2021                 HDL                      86                  12/04/2020            Lab Results       Component                Value               Date                       TRIG                     46                  07/02/2021                 TRIG                     58                  04/09/2021                 TRIG                     62                  12/04/2020            Lab Results       Component                Value               Date [place]                  143                 2020              CMP was WNL    HTN well controlled on current regimen  Anxiety -  She is under increased stress lately  She and her  sold their house and are moving to [place] in approximately 60 days  She has taken Xanax 0/25 which was prescribed by her cardiologist   Taking very sparingly  Last refill was in December  PDMP reviewed  Only other complaint is that she has ongoing sinus pressure - says she has used Flonase and Allegra but continues to have pressure in sinuses  Has seen ENT in the past and was told she needs hearing aids  She does not want to go back  She would like refills on all of her medications that her do  She is on Fosamax for osteoporosis  This was started by her OBGYN  Her DEXA was done in April showed osteoporosis  The following portions of the patient's history were reviewed and updated as appropriate:   She  has a past medical history of Anxiety, Arthritis, Bradycardia, Depression, Edema, Head injury, Heart murmur, History of echocardiogram (2016), History of palpitations, Hyperlipidemia, Hypertension, Migraine, PVD (peripheral vascular disease) ([facility] [address] ), and Shortness of breath  She   Patient Active Problem List    Diagnosis Date Noted    Environmental allergies 2021    Asymmetrical hearing loss of right ear 2021    Tinnitus 2020    Bradycardia 2020    Mixed hyperlipidemia 2018    Numbness and tingling in right hand 10/25/2018    Carpal tunnel syndrome on left 10/25/2018    Migraine with aura and without status migrainosus, not intractable 2018    Chronic left shoulder pain 2018    Myofascial pain syndrome 2014    Anxiety 2014    Arthritis 2014    Hypertension 2014    Postconcussion syndrome 2014     She  has a past surgical history that includes  section; Colonoscopy;  Tonsillectomy; pr esophagogastroduodenoscopy transoral diagnostic (N/A, 5/11/2017); and pr shldr arthroscop,surg,w/rotat cuff repr (Left, 8/29/2017)  Her family history includes Aneurysm in her father; Aortic aneurysm in her mother; Heart attack in her maternal uncle; Heart disease in her father, maternal uncle, maternal uncle, maternal uncle, maternal uncle, maternal uncle, and mother; Hypertension in her father; Migraines in her mother; Multiple sclerosis in her brother; No Known Problems in her daughter, maternal grandmother, paternal aunt, paternal aunt, paternal aunt, paternal aunt, and paternal grandmother  She  reports that she quit smoking about 31 years ago  She has never used smokeless tobacco  She reports previous alcohol use  She reports that she does not use drugs  Current Outpatient Medications   Medication Sig Dispense Refill    alendronate (FOSAMAX) 70 mg tablet Take 1 tablet (70 mg total) by mouth every 7 days Take on empty stomach in the morning  4 tablet 6    ALPRAZolam (XANAX) 0 25 mg tablet Take 1 tablet (0 25 mg total) by mouth 3 (three) times a day as needed for anxiety Usually takes 1/2 tab 25 tablet 0    BOTOX 100 units Inject 155 units intramuscularly every 90 days 200 Units 2    butalbital-acetaminophen-caffeine (FIORICET,ESGIC) -40 mg per tablet Take 1 tablet by mouth every 4 (four) hours as needed for headaches 30 tablet 2    fluticasone (FLONASE) 50 mcg/act nasal spray 1 spray into each nostril daily 16 g 1    furosemide (LASIX) 40 mg tablet Take 1 tablet (40 mg total) by mouth daily 90 tablet 3    hydrALAZINE (APRESOLINE) 50 mg tablet Take 1 tablet (50 mg total) by mouth 2 (two) times a day 180 tablet 3    Omega-3 Fatty Acids (Fish Oil) 500 MG CAPS Take by mouth 2 (two) times a day 2 Capsules       No current facility-administered medications for this visit       Current Outpatient Medications on File Prior to Visit   Medication Sig    [DISCONTINUED] Omega-3 Fatty Acids (OMEGA-3 PO) Take by mouth    BOTOX 100 units Inject 155 units intramuscularly every 90 days    butalbital-acetaminophen-caffeine (FIORICET,ESGIC) -40 mg per tablet Take 1 tablet by mouth every 4 (four) hours as needed for headaches    furosemide (LASIX) 40 mg tablet Take 1 tablet (40 mg total) by mouth daily    hydrALAZINE (APRESOLINE) 50 mg tablet Take 1 tablet (50 mg total) by mouth 2 (two) times a day    Omega-3 Fatty Acids (Fish Oil) 500 MG CAPS Take by mouth 2 (two) times a day 2 Capsules    [DISCONTINUED] alendronate (FOSAMAX) 70 mg tablet Take 1 tablet (70 mg total) by mouth every 7 days Take on empty stomach in the morning   [DISCONTINUED] ALPRAZolam (XANAX) 0 25 mg tablet Take 1 tablet (0 25 mg total) by mouth 3 (three) times a day as needed for anxiety Usually takes 1/2 tab    [DISCONTINUED] fexofenadine-pseudoephedrine (ALLEGRA-D 24) 180-240 MG per 24 hr tablet Take 1 tablet by mouth daily    [DISCONTINUED] fluticasone (FLONASE) 50 mcg/act nasal spray SPRAY 2 SPRAYS INTO EACH NOSTRIL EVERY DAY (Patient taking differently: 1 spray into each nostril daily as needed )    [DISCONTINUED] omega-3-acid ethyl esters (LOVAZA) 1 g capsule TAKE 2 CAPSULES (2 G TOTAL) BY MOUTH 2 (TWO) TIMES A DAY (Patient not taking: Reported on 7/2/2021)    [DISCONTINUED] ondansetron (ZOFRAN-ODT) 8 mg disintegrating tablet TAKE ONE (1) TABLET BY MOUTH EVERY 6 HOURS AS NEEDED    [DISCONTINUED] rosuvastatin (CRESTOR) 5 mg tablet Take 1 tablet (5 mg total) by mouth daily (Patient not taking: Reported on 6/8/2021)    [DISCONTINUED] TiZANidine (Zanaflex) 2 MG capsule Take 1 capsule (2 mg total) by mouth 3 (three) times a day for 4 days     No current facility-administered medications on file prior to visit  She is allergic to eggs or egg-derived products - food allergy, flu virus vaccine, iodine - food allergy, decadron [dexamethasone], advil [ibuprofen], naproxen, pravastatin, rosuvastatin, and sulfa antibiotics       Review of Systems   Constitutional: Negative for activity change  HENT: Positive for hearing loss, sinus pain and tinnitus  Respiratory: Negative for chest tightness and shortness of breath  Cardiovascular: Negative for chest pain and leg swelling  Allergic/Immunologic: Positive for environmental allergies  Neurological: Negative for headaches  Psychiatric/Behavioral: Negative for dysphoric mood  The patient is nervous/anxious  Increased stress         Objective:      /80 (BP Location: Left arm, Patient Position: Sitting, Cuff Size: Standard)   Pulse 61   Temp (!) 96 6 °F (35 9 °C)   Ht 5' (1 524 m)   Wt 60 3 kg (133 lb)   LMP  (LMP Unknown)   SpO2 99%   BMI 25 97 kg/m²          Physical Exam  Vitals and nursing note reviewed  Constitutional:       General: She is not in acute distress  Appearance: Normal appearance  She is not ill-appearing, toxic-appearing or diaphoretic  HENT:      Head: Normocephalic and atraumatic  Right Ear: Tympanic membrane, ear canal and external ear normal       Left Ear: Tympanic membrane, ear canal and external ear normal    Cardiovascular:      Rate and Rhythm: Normal rate and regular rhythm  Heart sounds: No murmur heard  No friction rub  Pulmonary:      Effort: Pulmonary effort is normal  No respiratory distress  Breath sounds: Normal breath sounds  No stridor  No wheezing, rhonchi or rales  Musculoskeletal:         General: No swelling  Right lower leg: No edema  Left lower leg: No edema  Neurological:      General: No focal deficit present  Mental Status: She is alert  Mental status is at baseline  Psychiatric:         Attention and Perception: Attention normal          Mood and Affect: Mood normal          Speech: Speech normal          Behavior: Behavior normal          Thought Content:  Thought content normal          Judgment: Judgment normal

## 2021-08-03 ENCOUNTER — OFFICE VISIT (OUTPATIENT)
Dept: URGENT CARE | Facility: CLINIC | Age: 66
End: 2021-08-03
Payer: MEDICARE

## 2021-08-03 VITALS
SYSTOLIC BLOOD PRESSURE: 118 MMHG | HEART RATE: 75 BPM | BODY MASS INDEX: 25.39 KG/M2 | RESPIRATION RATE: 16 BRPM | DIASTOLIC BLOOD PRESSURE: 80 MMHG | TEMPERATURE: 98.3 F | WEIGHT: 130 LBS | OXYGEN SATURATION: 98 %

## 2021-08-03 DIAGNOSIS — J06.9 VIRAL UPPER RESPIRATORY TRACT INFECTION: Primary | ICD-10-CM

## 2021-08-03 PROCEDURE — 99213 OFFICE O/P EST LOW 20 MIN: CPT | Performed by: PHYSICIAN ASSISTANT

## 2021-08-03 PROCEDURE — G0463 HOSPITAL OUTPT CLINIC VISIT: HCPCS | Performed by: PHYSICIAN ASSISTANT

## 2021-08-03 NOTE — PATIENT INSTRUCTIONS
Upper Respiratory Infection   WHAT YOU NEED TO KNOW:   An upper respiratory infection is also called a cold  It can affect your nose, throat, ears, and sinuses  Cold symptoms are usually worst for the first 3 to 5 days  Most people get better in 7 to 14 days  You may continue to cough for 2 to 3 weeks  Colds are caused by viruses and do not get better with antibiotics  DISCHARGE INSTRUCTIONS:   Call your local emergency number (911 in the [address] [facility]) if:   · You have chest pain or trouble breathing  Return to the emergency department if:   · You have a fever over 102ºF (39ºC)  Call your doctor if:   · You have a low fever  · Your sore throat gets worse or you see white or yellow spots in your throat  · Your symptoms get worse after 3 to 5 days or are not better in 14 days  · You have a rash anywhere on your skin  · You have large, tender lumps in your neck  · You have thick, green, or yellow drainage from your nose  · You cough up thick yellow, green, or bloody mucus  · You have a bad earache  · You have questions or concerns about your condition or care  Medicines: You may need any of the following:  · Decongestants  help reduce nasal congestion and help you breathe more easily  If you take decongestant pills, they may make you feel restless or cause problems with your sleep  Do not use decongestant sprays for more than a few days  · Cough suppressants  help reduce coughing  Ask your healthcare provider which type of cough medicine is best for you  · NSAIDs , such as ibuprofen, help decrease swelling, pain, and fever  NSAIDs can cause stomach bleeding or kidney problems in certain people  If you take blood thinner medicine, always ask your healthcare provider if NSAIDs are safe for you  Always read the medicine label and follow directions  · Acetaminophen  decreases pain and fever  It is available without a doctor's order  Ask how much to take and how often to take it  Follow directions  Read the labels of all other medicines you are using to see if they also contain acetaminophen, or ask your doctor or pharmacist  Acetaminophen can cause liver damage if not taken correctly  Do not use more than 4 grams (4,000 milligrams) total of acetaminophen in one day  · Take your medicine as directed  Contact your healthcare provider if you think your medicine is not helping or if you have side effects  Tell him or her if you are allergic to any medicine  Keep a list of the medicines, vitamins, and herbs you take  Include the amounts, and when and why you take them  Bring the list or the pill bottles to follow-up visits  Carry your medicine list with you in case of an emergency  Self-care:   · Rest as much as possible  Slowly start to do more each day  · Drink more liquids as directed  Liquids will help thin and loosen mucus so you can cough it up  Liquids will also help prevent dehydration  Liquids that help prevent dehydration include water, fruit juice, and broth  Do not drink liquids that contain caffeine  Caffeine can increase your risk for dehydration  Ask your healthcare provider how much liquid to drink each day  · Soothe a sore throat  Gargle with warm salt water  Make salt water by dissolving ¼ teaspoon salt in 1 cup warm water  You may also suck on hard candy or throat lozenges  You may use a sore throat spray  · Use a humidifier or vaporizer  Use a cool mist humidifier or a vaporizer to increase air moisture in your home  This may make it easier for you to breathe and help decrease your cough  · Use saline nasal drops as directed  These help relieve congestion  · Apply petroleum-based jelly around the outside of your nostrils  This can decrease irritation from blowing your nose  · Do not smoke  Nicotine and other chemicals in cigarettes and cigars can make your symptoms worse  They can also cause infections such as bronchitis or pneumonia   Ask your healthcare provider for information if you currently smoke and need help to quit  E-cigarettes or smokeless tobacco still contain nicotine  Talk to your healthcare provider before you use these products  Prevent a cold:   · Wash your hands often  Use soap and water every time you wash your hands  Rub your soapy hands together, lacing your fingers  Use the fingers of one hand to scrub under the nails of the other hand  Wash for at least 20 seconds  Rinse with warm, running water for several seconds  Then dry your hands  Use germ-killing gel if soap and water are not available  Do not touch your eyes or mouth without washing your hands first          · Cover a sneeze or cough  Use a tissue that covers your mouth and nose  Put the used tissue in the trash right away  Use the bend of your arm if a tissue is not available  Wash your hands well with soap and water or use a hand   Do not stand close to anyone who is sneezing or coughing  · Try to stay away from others while you are sick  This is especially important during the first 2 to 3 days when the virus is more easily spread  Wait until a fever, cough, or other symptoms are gone before you return to work or other regular activities  · Do not share items while you are sick  This includes food, drinks, eating utensils, and dishes  Follow up with your doctor as directed:  Write down your questions so you remember to ask them during your visits  © Copyright [employer] 2021 Information is for End User's use only and may not be sold, redistributed or otherwise used for commercial purposes  All illustrations and images included in CareNotes® are the copyrighted property of [employer]  or Julieta Rodriguez  The above information is an  only  It is not intended as medical advice for individual conditions or treatments   Talk to your doctor, nurse or pharmacist before following any medical regimen to see if it is safe and effective for you

## 2021-08-03 NOTE — PROGRESS NOTES
[facility]        NAME: Emma Peterson is a 77 y o  female  : 1955    MRN: [id number]  DATE: August 3, 2021  TIME: 3:01 PM    Assessment and Plan   No primary diagnosis found  No diagnosis found  Refused covid testing  Patient Instructions   Patient Instructions     Upper Respiratory Infection   WHAT YOU NEED TO KNOW:   An upper respiratory infection is also called a cold  It can affect your nose, throat, ears, and sinuses  Cold symptoms are usually worst for the first 3 to 5 days  Most people get better in 7 to 14 days  You may continue to cough for 2 to 3 weeks  Colds are caused by viruses and do not get better with antibiotics  DISCHARGE INSTRUCTIONS:   Call your local emergency number (911 in the [address] [place] [address],[address]) if:   · You have chest pain or trouble breathing  Return to the emergency department if:   · You have a fever over 102ºF (39ºC)  Call your doctor if:   · You have a low fever  · Your sore throat gets worse or you see white or yellow spots in your throat  · Your symptoms get worse after 3 to 5 days or are not better in 14 days  · You have a rash anywhere on your skin  · You have large, tender lumps in your neck  · You have thick, green, or yellow drainage from your nose  · You cough up thick yellow, green, or bloody mucus  · You have a bad earache  · You have questions or concerns about your condition or care  Medicines: You may need any of the following:  · Decongestants  help reduce nasal congestion and help you breathe more easily  If you take decongestant pills, they may make you feel restless or cause problems with your sleep  Do not use decongestant sprays for more than a few days  · Cough suppressants  help reduce coughing  Ask your healthcare provider which type of cough medicine is best for you  · NSAIDs , such as ibuprofen, help decrease swelling, pain, and fever  NSAIDs can cause stomach bleeding or kidney problems in certain people  If you take blood thinner medicine, always ask your healthcare provider if NSAIDs are safe for you  Always read the medicine label and follow directions  · Acetaminophen  decreases pain and fever  It is available without a doctor's order  Ask how much to take and how often to take it  Follow directions  Read the labels of all other medicines you are using to see if they also contain acetaminophen, or ask your doctor or pharmacist  Acetaminophen can cause liver damage if not taken correctly  Do not use more than 4 grams (4,000 milligrams) total of acetaminophen in one day  · Take your medicine as directed  Contact your healthcare provider if you think your medicine is not helping or if you have side effects  Tell him or her if you are allergic to any medicine  Keep a list of the medicines, vitamins, and herbs you take  Include the amounts, and when and why you take them  Bring the list or the pill bottles to follow-up visits  Carry your medicine list with you in case of an emergency  Self-care:   · Rest as much as possible  Slowly start to do more each day  · Drink more liquids as directed  Liquids will help thin and loosen mucus so you can cough it up  Liquids will also help prevent dehydration  Liquids that help prevent dehydration include water, fruit juice, and broth  Do not drink liquids that contain caffeine  Caffeine can increase your risk for dehydration  Ask your healthcare provider how much liquid to drink each day  · Soothe a sore throat  Gargle with warm salt water  Make salt water by dissolving ¼ teaspoon salt in 1 cup warm water  You may also suck on hard candy or throat lozenges  You may use a sore throat spray  · Use a humidifier or vaporizer  Use a cool mist humidifier or a vaporizer to increase air moisture in your home  This may make it easier for you to breathe and help decrease your cough  · Use saline nasal drops as directed  These help relieve congestion      · Apply petroleum-based jelly around the outside of your nostrils  This can decrease irritation from blowing your nose  · Do not smoke  Nicotine and other chemicals in cigarettes and cigars can make your symptoms worse  They can also cause infections such as bronchitis or pneumonia  Ask your healthcare provider for information if you currently smoke and need help to quit  E-cigarettes or smokeless tobacco still contain nicotine  Talk to your healthcare provider before you use these products  Prevent a cold:   · Wash your hands often  Use soap and water every time you wash your hands  Rub your soapy hands together, lacing your fingers  Use the fingers of one hand to scrub under the nails of the other hand  Wash for at least 20 seconds  Rinse with warm, running water for several seconds  Then dry your hands  Use germ-killing gel if soap and water are not available  Do not touch your eyes or mouth without washing your hands first          · Cover a sneeze or cough  Use a tissue that covers your mouth and nose  Put the used tissue in the trash right away  Use the bend of your arm if a tissue is not available  Wash your hands well with soap and water or use a hand   Do not stand close to anyone who is sneezing or coughing  · Try to stay away from others while you are sick  This is especially important during the first 2 to 3 days when the virus is more easily spread  Wait until a fever, cough, or other symptoms are gone before you return to work or other regular activities  · Do not share items while you are sick  This includes food, drinks, eating utensils, and dishes  Follow up with your doctor as directed:  Write down your questions so you remember to ask them during your visits  © Copyright [employer] 2021 Information is for End User's use only and may not be sold, redistributed or otherwise used for commercial purposes   All illustrations and images included in CareNotes® are the copyrighted property of A [employer] M [employer] Inc  or [address]   The above information is an  only  It is not intended as medical advice for individual conditions or treatments  Talk to your doctor, nurse or pharmacist before following any medical regimen to see if it is safe and effective for you  Follow up with PCP in 3-5 days  Proceed to  ER if symptoms worsen  Chief Complaint     Chief Complaint   Patient presents with    Cold Like Symptoms     pt states she started with allergy symptoms 2 days ago and today became worse with cough, headache, sinus pressure, and ears clogged  She is concerned for bronchitis  History of Present Illness       Presents with headache, ears clogged, chest pressure, congested, runny nose, starting yesterday, SOB with exertion, body aches  Denies fevers, SOB at rest, sharp or crushing chest pains, loss of taste, loss of smell, sick contacts, or covid exposure  Denies any concern for covid  Has gotten vaccinated against covid  Denies any chronic medical conditions  States this feels like her normal bronchitis      Review of Systems   Review of Systems   Constitutional: Negative for chills, fatigue and fever  HENT: Positive for congestion, ear pain and sinus pressure  Negative for ear discharge, postnasal drip, rhinorrhea, sinus pain and sore throat  Respiratory: Positive for cough, chest tightness and shortness of breath  Negative for wheezing  Cardiovascular: Negative for chest pain and palpitations  Musculoskeletal: Negative for arthralgias and myalgias  Neurological: Positive for headaches           Current Medications       Current Outpatient Medications:     alendronate (FOSAMAX) 70 mg tablet, Take 1 tablet (70 mg total) by mouth every 7 days Take on empty stomach in the morning , Disp: 4 tablet, Rfl: 6    ALPRAZolam (XANAX) 0 25 mg tablet, Take 1 tablet (0 25 mg total) by mouth 3 (three) times a day as needed for anxiety Usually takes 1/2 tab, Disp: 25 tablet, Rfl: 0    BOTOX 100 units, Inject 155 units intramuscularly every 90 days, Disp: 200 Units, Rfl: 2    butalbital-acetaminophen-caffeine (FIORICET,ESGIC) -40 mg per tablet, Take 1 tablet by mouth every 4 (four) hours as needed for headaches, Disp: 30 tablet, Rfl: 2    fluticasone (FLONASE) 50 mcg/act nasal spray, 1 spray into each nostril daily, Disp: 16 g, Rfl: 1    furosemide (LASIX) 40 mg tablet, Take 1 tablet (40 mg total) by mouth daily, Disp: 90 tablet, Rfl: 3    hydrALAZINE (APRESOLINE) 50 mg tablet, Take 1 tablet (50 mg total) by mouth 2 (two) times a day, Disp: 180 tablet, Rfl: 3    Omega-3 Fatty Acids (Fish Oil) 500 MG CAPS, Take by mouth 2 (two) times a day 2 Capsules, Disp: , Rfl:     Current Allergies     Allergies as of 08/03/2021 - Reviewed 08/03/2021   Allergen Reaction Noted    Eggs or egg-derived products - food allergy Anaphylaxis 05/08/2017    Flu virus vaccine Anaphylaxis 11/09/2020    Iodine - food allergy Anaphylaxis 05/08/2017    Decadron [dexamethasone] Nausea Only 08/09/2019    Advil [ibuprofen] Swelling 05/08/2017    Naproxen Swelling 05/08/2017    Pravastatin Myalgia 06/08/2021    Rosuvastatin Myalgia 06/08/2021    Sulfa antibiotics Swelling 04/05/2021            The following portions of the patient's history were reviewed and updated as appropriate: allergies, current medications, past family history, past medical history, past social history, past surgical history and problem list      Past Medical History:   Diagnosis Date    Anxiety     Arthritis     Bradycardia     Depression     Edema     Head injury     Heart murmur     History of echocardiogram 07/18/2016    SEH-hypertensive hemodynamic response, LVH, echo portion is negative for ischemia      History of palpitations     Hyperlipidemia     Hypertension     Migraine     PVD (peripheral vascular disease) (HCC)     Shortness of breath        Past Surgical History:   Procedure Laterality Date     SECTION      COLONOSCOPY      [place] ESOPHAGOGASTRODUODENOSCOPY TRANSORAL DIAGNOSTIC N/A 2017    Procedure: EGD AND COLONOSCOPY;  Surgeon: Kathleen Santos MD;  Location: [facility]; Service: Gastroenterology    [place] SHLDR ARTHROSCOP,SURG,W/ROTAT CUFF REPR Left 2017    Procedure: SHOULDER ARTHROSCOPY ROTATOR CUFF REPAIR; SUBACROMIAL DECOMPRESSION; BICEPS TENODESIS;  Surgeon: Cali Sharma DO;  [facility];  Service: Orthopedics    TONSILLECTOMY         Family History   Problem Relation Age of Onset    Migraines Mother     Aortic aneurysm Mother     Heart disease Mother     Aneurysm Father         brain stem    Heart disease Father     Hypertension Father     No Known Problems Daughter     No Known Problems Maternal Grandmother     No Known Problems Paternal Grandmother     No Known Problems Paternal Aunt     No Known Problems Paternal Aunt     No Known Problems Paternal Aunt     No Known Problems Paternal Aunt     Multiple sclerosis Brother     Heart disease Maternal Uncle     Heart attack Maternal Uncle     Heart disease Maternal Uncle     Heart disease Maternal Uncle     Heart disease Maternal Uncle     Heart disease Maternal Uncle     Breast cancer Neg Hx          Medications have been verified  Objective   /80   Pulse 75   Temp 98 3 °F (36 8 °C) (Temporal)   Resp 16   Wt 59 kg (130 lb)   LMP  (LMP Unknown)   SpO2 98%   BMI 25 39 kg/m²        Physical Exam     Physical Exam  Constitutional:       General: She is not in acute distress  Appearance: Normal appearance  She is not ill-appearing or diaphoretic  HENT:      Right Ear: Tympanic membrane, ear canal and external ear normal       Left Ear: Tympanic membrane, ear canal and external ear normal       Nose: Nose normal       Mouth/Throat:      Mouth: Mucous membranes are moist       Pharynx: Oropharynx is clear     Eyes:      Conjunctiva/sclera: Conjunctivae normal    Cardiovascular:      Rate and Rhythm: Normal rate and regular rhythm  Heart sounds: Normal heart sounds  Pulmonary:      Effort: Pulmonary effort is normal  No respiratory distress  Breath sounds: Normal breath sounds  No wheezing  Skin:     General: Skin is warm and dry  Neurological:      Mental Status: She is alert     Psychiatric:         Mood and Affect: Mood normal          Behavior: Behavior normal

## 2021-08-06 ENCOUNTER — OFFICE VISIT (OUTPATIENT)
Dept: FAMILY MEDICINE CLINIC | Facility: CLINIC | Age: 66
End: 2021-08-06
Payer: MEDICARE

## 2021-08-06 ENCOUNTER — APPOINTMENT (OUTPATIENT)
Dept: RADIOLOGY | Facility: CLINIC | Age: 66
End: 2021-08-06
Payer: MEDICARE

## 2021-08-06 VITALS
OXYGEN SATURATION: 97 % | BODY MASS INDEX: 26 KG/M2 | SYSTOLIC BLOOD PRESSURE: 126 MMHG | TEMPERATURE: 98.7 F | HEART RATE: 68 BPM | WEIGHT: 132.4 LBS | DIASTOLIC BLOOD PRESSURE: 74 MMHG | HEIGHT: 60 IN

## 2021-08-06 DIAGNOSIS — R07.89 CHEST TIGHTNESS: ICD-10-CM

## 2021-08-06 DIAGNOSIS — R05.9 COUGH: ICD-10-CM

## 2021-08-06 DIAGNOSIS — J01.10 ACUTE NON-RECURRENT FRONTAL SINUSITIS: Primary | ICD-10-CM

## 2021-08-06 PROCEDURE — 71046 X-RAY EXAM CHEST 2 VIEWS: CPT

## 2021-08-06 PROCEDURE — U0005 INFEC AGEN DETEC AMPLI PROBE: HCPCS | Performed by: PHYSICIAN ASSISTANT

## 2021-08-06 PROCEDURE — U0003 INFECTIOUS AGENT DETECTION BY NUCLEIC ACID (DNA OR RNA); SEVERE ACUTE RESPIRATORY SYNDROME CORONAVIRUS 2 (SARS-COV-2) (CORONAVIRUS DISEASE [COVID-19]), AMPLIFIED PROBE TECHNIQUE, MAKING USE OF HIGH THROUGHPUT TECHNOLOGIES AS DESCRIBED BY CMS-2020-01-R: HCPCS | Performed by: PHYSICIAN ASSISTANT

## 2021-08-06 PROCEDURE — 99214 OFFICE O/P EST MOD 30 MIN: CPT | Performed by: PHYSICIAN ASSISTANT

## 2021-08-06 RX ORDER — ALBUTEROL SULFATE 90 UG/1
2 AEROSOL, METERED RESPIRATORY (INHALATION) EVERY 6 HOURS PRN
Qty: 6.7 G | Refills: 0 | Status: SHIPPED | OUTPATIENT
Start: 2021-08-06 | End: 2021-08-30 | Stop reason: SDUPTHER

## 2021-08-06 RX ORDER — BROMPHENIRAMINE MALEATE, PSEUDOEPHEDRINE HYDROCHLORIDE, AND DEXTROMETHORPHAN HYDROBROMIDE 2; 30; 10 MG/5ML; MG/5ML; MG/5ML
5 SYRUP ORAL 4 TIMES DAILY PRN
Qty: 120 ML | Refills: 0 | Status: SHIPPED | OUTPATIENT
Start: 2021-08-06 | End: 2021-08-30 | Stop reason: ALTCHOICE

## 2021-08-06 RX ORDER — AMOXICILLIN AND CLAVULANATE POTASSIUM 875; 125 MG/1; MG/1
1 TABLET, FILM COATED ORAL EVERY 12 HOURS SCHEDULED
Qty: 14 TABLET | Refills: 0 | Status: SHIPPED | OUTPATIENT
Start: 2021-08-06 | End: 2021-08-13

## 2021-08-06 NOTE — PROGRESS NOTES
Assessment/Plan:       Problem List Items Addressed This Visit     None      Visit Diagnoses     Acute non-recurrent frontal sinusitis    -  Primary    Relevant Medications    amoxicillin-clavulanate (Augmentin) 875-125 mg per tablet    brompheniramine-pseudoephedrine-DM 30-2-10 MG/5ML syrup    Other Relevant Orders    Novel Coronavirus (Covid-19),PCR [facility] - Collected in Office    Cough        Relevant Medications    albuterol (Proventil HFA) 90 mcg/act inhaler    Other Relevant Orders    Novel Coronavirus (Covid-19),PCR [facility] - Collected in Office    XR chest pa & lateral    Chest tightness        Relevant Medications    albuterol (Proventil HFA) 90 mcg/act inhaler    Other Relevant Orders    Novel Coronavirus (Covid-19),PCR [facility] - Collected in Office    XR chest pa & lateral        7 days of sx, no improvement with otc medications  Treat for sinusitis  Pronounced rhonchi on lung exam, check CXR  O2 reassuring  Prn albuterol and bromphed  R/o covid  Follow up for persisting or worsening sx  Subjective:      Patient ID: Tyler Quigley is a 77 y o  female  Pt presents with concerns of ongoing sx of sinus pain, bilateral ear pain, sore throat, cough, congestion, chest tightness and SOB  Sx x 7 days, seen in UC on 8/3, dx with viral URI  Sx did not improve  No fevers  No improvement with OTC medications  The following portions of the patient's history were reviewed and updated as appropriate:   She  has a past medical history of Anxiety, Arthritis, Bradycardia, Depression, Edema, Head injury, Heart murmur, History of echocardiogram (07/18/2016), History of palpitations, Hyperlipidemia, Hypertension, Migraine, PVD (peripheral vascular disease) ([facility] [address] ), and Shortness of breath    She   Patient Active Problem List    Diagnosis Date Noted    Environmental allergies 05/12/2021    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/20/2021    Tinnitus 05/19/2020    Bradycardia 01/14/2020    Mixed hyperlipidemia 2018    Numbness and tingling in right hand 10/25/2018    Carpal tunnel syndrome on left 10/25/2018    Migraine with aura and without status migrainosus, not intractable 2018    Chronic left shoulder pain 2018    Myofascial pain syndrome 2014    Anxiety 2014    Arthritis 2014    Hypertension 2014    Postconcussion syndrome 2014     She  has a past surgical history that includes  section; Colonoscopy; Tonsillectomy; pr esophagogastroduodenoscopy transoral diagnostic (N/A, 2017); and pr shldr arthroscop,surg,w/rotat cuff repr (Left, 2017)  Her family history includes Aneurysm in her father; Aortic aneurysm in her mother; Heart attack in her maternal uncle; Heart disease in her father, maternal uncle, maternal uncle, maternal uncle, maternal uncle, maternal uncle, and mother; Hypertension in her father; Migraines in her mother; Multiple sclerosis in her brother; No Known Problems in her daughter, maternal grandmother, paternal aunt, paternal aunt, paternal aunt, paternal aunt, and paternal grandmother  She  reports that she quit smoking about 31 years ago  She has never used smokeless tobacco  She reports previous alcohol use  She reports that she does not use drugs  Current Outpatient Medications   Medication Sig Dispense Refill    alendronate (FOSAMAX) 70 mg tablet Take 1 tablet (70 mg total) by mouth every 7 days Take on empty stomach in the morning   4 tablet 6    ALPRAZolam (XANAX) 0 25 mg tablet Take 1 tablet (0 25 mg total) by mouth 3 (three) times a day as needed for anxiety Usually takes 1/2 tab 25 tablet 0    BOTOX 100 units Inject 155 units intramuscularly every 90 days 200 Units 2    butalbital-acetaminophen-caffeine (FIORICET,ESGIC) -40 mg per tablet Take 1 tablet by mouth every 4 (four) hours as needed for headaches 30 tablet 2    fluticasone (FLONASE) 50 mcg/act nasal spray 1 spray into each nostril daily 16 g 1    furosemide (LASIX) 40 mg tablet Take 1 tablet (40 mg total) by mouth daily 90 tablet 3    hydrALAZINE (APRESOLINE) 50 mg tablet Take 1 tablet (50 mg total) by mouth 2 (two) times a day 180 tablet 3    Omega-3 Fatty Acids (Fish Oil) 500 MG CAPS Take by mouth 2 (two) times a day 2 Capsules      albuterol (Proventil HFA) 90 mcg/act inhaler Inhale 2 puffs every 6 (six) hours as needed for wheezing or shortness of breath 6 7 g 0    amoxicillin-clavulanate (Augmentin) 875-125 mg per tablet Take 1 tablet by mouth every 12 (twelve) hours for 7 days 14 tablet 0    brompheniramine-pseudoephedrine-DM 30-2-10 MG/5ML syrup Take 5 mL by mouth 4 (four) times a day as needed for congestion or cough 120 mL 0     No current facility-administered medications for this visit  Current Outpatient Medications on File Prior to Visit   Medication Sig    alendronate (FOSAMAX) 70 mg tablet Take 1 tablet (70 mg total) by mouth every 7 days Take on empty stomach in the morning   ALPRAZolam (XANAX) 0 25 mg tablet Take 1 tablet (0 25 mg total) by mouth 3 (three) times a day as needed for anxiety Usually takes 1/2 tab    BOTOX 100 units Inject 155 units intramuscularly every 90 days    butalbital-acetaminophen-caffeine (FIORICET,ESGIC) -40 mg per tablet Take 1 tablet by mouth every 4 (four) hours as needed for headaches    fluticasone (FLONASE) 50 mcg/act nasal spray 1 spray into each nostril daily    furosemide (LASIX) 40 mg tablet Take 1 tablet (40 mg total) by mouth daily    hydrALAZINE (APRESOLINE) 50 mg tablet Take 1 tablet (50 mg total) by mouth 2 (two) times a day    Omega-3 Fatty Acids (Fish Oil) 500 MG CAPS Take by mouth 2 (two) times a day 2 Capsules     No current facility-administered medications on file prior to visit       She is allergic to eggs or egg-derived products - food allergy, flu virus vaccine, iodine - food allergy, decadron [dexamethasone], advil [ibuprofen], naproxen, pravastatin, rosuvastatin, and sulfa antibiotics       Review of Systems   Constitutional: Positive for fatigue  Negative for chills and fever  HENT: Positive for congestion, ear pain, sinus pressure, sinus pain and sore throat  Negative for hearing loss, nosebleeds, postnasal drip, rhinorrhea and sneezing  Eyes: Negative for pain, discharge, itching and visual disturbance  Respiratory: Positive for cough, chest tightness and shortness of breath  Negative for wheezing  Cardiovascular: Negative for chest pain, palpitations and leg swelling  Gastrointestinal: Negative for abdominal pain, blood in stool, constipation, diarrhea, nausea and vomiting  Genitourinary: Negative for frequency and urgency  Neurological: Positive for headaches  Negative for dizziness, light-headedness and numbness  Objective:      /74 (BP Location: Left arm, Patient Position: Sitting, Cuff Size: Adult)   Pulse 68   Temp 98 7 °F (37 1 °C) (Tympanic)   Ht 5' (1 524 m)   Wt 60 1 kg (132 lb 6 4 oz)   LMP  (LMP Unknown)   SpO2 97%   BMI 25 86 kg/m²          Physical Exam  Vitals and nursing note reviewed  Constitutional:       General: She is not in acute distress  Appearance: Normal appearance  HENT:      Head: Normocephalic and atraumatic  Right Ear: Tympanic membrane, ear canal and external ear normal       Left Ear: Tympanic membrane, ear canal and external ear normal       Nose: Congestion present  Right Sinus: Maxillary sinus tenderness and frontal sinus tenderness present  Left Sinus: Maxillary sinus tenderness and frontal sinus tenderness present  Comments: significant TTP of maxillary sinuses     Mouth/Throat:      Mouth: Mucous membranes are moist       Pharynx: Oropharynx is clear  No oropharyngeal exudate or posterior oropharyngeal erythema  Comments: Hoarseness of voice  Eyes:      Pupils: Pupils are equal, round, and reactive to light     Cardiovascular:      Rate and Rhythm: Normal rate and regular rhythm  Heart sounds: Normal heart sounds  No murmur heard  Pulmonary:      Effort: Pulmonary effort is normal  No respiratory distress  Breath sounds: Rhonchi present  No wheezing or rales  Abdominal:      General: Bowel sounds are normal       Palpations: Abdomen is soft  Musculoskeletal:         General: Normal range of motion  Cervical back: Normal range of motion and neck supple  Lymphadenopathy:      Cervical: Cervical adenopathy present  Skin:     General: Skin is warm and dry  Neurological:      Mental Status: She is alert and oriented to person, place, and time     Psychiatric:         Mood and Affect: Mood and affect normal

## 2021-08-07 LAB — SARS-COV-2 RNA RESP QL NAA+PROBE: NEGATIVE

## 2021-08-13 ENCOUNTER — TELEPHONE (OUTPATIENT)
Dept: NEUROLOGY | Facility: CLINIC | Age: 66
End: 2021-08-13

## 2021-08-13 DIAGNOSIS — G43.719 INTRACTABLE CHRONIC MIGRAINE WITHOUT AURA AND WITHOUT STATUS MIGRAINOSUS: ICD-10-CM

## 2021-08-13 NOTE — TELEPHONE ENCOUNTER
Called patient to re-schedule her Botox from 9-9-21 to 9-16-21  Due to the fact that it was scheduled prior to the 91 days  Pt is moving out of [place] on 9-10-21 so she cannot receive the Botox injection  Pt is requesting a refill on her [address] [place] [address] Street for September - November to get her through until she finds a new neurologist in [place]

## 2021-08-16 RX ORDER — BUTALBITAL, ACETAMINOPHEN AND CAFFEINE 50; 325; 40 MG/1; MG/1; MG/1
1 TABLET ORAL EVERY 4 HOURS PRN
Qty: 30 TABLET | Refills: 2 | Status: CANCELLED | OUTPATIENT
Start: 2021-08-16

## 2021-08-16 NOTE — TELEPHONE ENCOUNTER
Received a call from patient, she is calling to see if there is any way we could do her injection still on 9/9/21 since she will be moving out [place] the next day and is unable to come the following week  I advised patient that unfortunately if she were to come in sooner than 91 days that her insurance would not pay for Botox  Patient verbalized understanding  During call patient requested a refill of Fiorcet medication, to be sent to the [facility] in [address], she states she took last one yesterday and is now out

## 2021-08-20 DIAGNOSIS — G43.719 INTRACTABLE CHRONIC MIGRAINE WITHOUT AURA AND WITHOUT STATUS MIGRAINOSUS: ICD-10-CM

## 2021-08-20 RX ORDER — BUTALBITAL, ACETAMINOPHEN AND CAFFEINE 50; 325; 40 MG/1; MG/1; MG/1
1 TABLET ORAL EVERY 4 HOURS PRN
Qty: 30 TABLET | Refills: 2 | Status: SHIPPED | OUTPATIENT
Start: 2021-08-20 | End: 2021-08-30 | Stop reason: SDUPTHER

## 2021-08-20 NOTE — TELEPHONE ENCOUNTER
Pt made aware that refill sent  She asked about her records  I made her aware that she would need to sign BRUCE  Blank BRUCE mailed to her

## 2021-08-30 ENCOUNTER — OFFICE VISIT (OUTPATIENT)
Dept: FAMILY MEDICINE CLINIC | Facility: CLINIC | Age: 66
End: 2021-08-30
Payer: MEDICARE

## 2021-08-30 VITALS
HEIGHT: 60 IN | BODY MASS INDEX: 26 KG/M2 | DIASTOLIC BLOOD PRESSURE: 68 MMHG | SYSTOLIC BLOOD PRESSURE: 122 MMHG | TEMPERATURE: 97.8 F | OXYGEN SATURATION: 96 % | WEIGHT: 132.4 LBS | HEART RATE: 87 BPM

## 2021-08-30 DIAGNOSIS — I10 ESSENTIAL HYPERTENSION: ICD-10-CM

## 2021-08-30 DIAGNOSIS — M81.0 OSTEOPOROSIS, UNSPECIFIED OSTEOPOROSIS TYPE, UNSPECIFIED PATHOLOGICAL FRACTURE PRESENCE: ICD-10-CM

## 2021-08-30 DIAGNOSIS — R60.9 EDEMA, UNSPECIFIED TYPE: ICD-10-CM

## 2021-08-30 DIAGNOSIS — F41.9 ANXIETY: ICD-10-CM

## 2021-08-30 DIAGNOSIS — G43.719 INTRACTABLE CHRONIC MIGRAINE WITHOUT AURA AND WITHOUT STATUS MIGRAINOSUS: ICD-10-CM

## 2021-08-30 PROCEDURE — 99214 OFFICE O/P EST MOD 30 MIN: CPT | Performed by: FAMILY MEDICINE

## 2021-08-30 RX ORDER — ALENDRONATE SODIUM 70 MG/1
70 TABLET ORAL
Qty: 4 TABLET | Refills: 3 | Status: SHIPPED | OUTPATIENT
Start: 2021-08-30 | End: 2022-03-14

## 2021-08-30 RX ORDER — ALPRAZOLAM 0.25 MG/1
0.25 TABLET ORAL 3 TIMES DAILY PRN
Qty: 25 TABLET | Refills: 0 | Status: SHIPPED | OUTPATIENT
Start: 2021-08-30

## 2021-08-30 RX ORDER — FUROSEMIDE 40 MG/1
40 TABLET ORAL DAILY
Qty: 90 TABLET | Refills: 0 | Status: SHIPPED | OUTPATIENT
Start: 2021-08-30

## 2021-08-30 RX ORDER — ALBUTEROL SULFATE 90 UG/1
2 AEROSOL, METERED RESPIRATORY (INHALATION) EVERY 6 HOURS PRN
Qty: 6.7 G | Refills: 0 | Status: SHIPPED | OUTPATIENT
Start: 2021-08-30 | End: 2021-08-30 | Stop reason: ALTCHOICE

## 2021-08-30 RX ORDER — BUTALBITAL, ACETAMINOPHEN AND CAFFEINE 50; 325; 40 MG/1; MG/1; MG/1
1 TABLET ORAL EVERY 4 HOURS PRN
Qty: 30 TABLET | Refills: 0 | Status: SHIPPED | OUTPATIENT
Start: 2021-08-30

## 2021-08-30 RX ORDER — HYDRALAZINE HYDROCHLORIDE 50 MG/1
50 TABLET, FILM COATED ORAL 2 TIMES DAILY
Qty: 180 TABLET | Refills: 0 | Status: SHIPPED | OUTPATIENT
Start: 2021-08-30

## 2021-08-30 NOTE — PROGRESS NOTES
Assessment/Plan:         Problem List Items Addressed This Visit        Cardiovascular and Mediastinum    Hypertension    Relevant Medications    furosemide (LASIX) 40 mg tablet    hydrALAZINE (APRESOLINE) 50 mg tablet       Other    Anxiety    Relevant Medications    ALPRAZolam (XANAX) 0 25 mg tablet      Other Visit Diagnoses     Osteoporosis, unspecified osteoporosis type, unspecified pathological fracture presence        Relevant Medications    alendronate (FOSAMAX) 70 mg tablet    Intractable chronic migraine without aura and without status migrainosus        Relevant Medications    butalbital-acetaminophen-caffeine (FIORICET,ESGIC) -40 mg per tablet    Edema, unspecified type        Relevant Medications    furosemide (LASIX) 40 mg tablet    hydrALAZINE (APRESOLINE) 50 mg tablet            Subjective:      Patient ID: Roge Echeverria is a 77 y o  female  77year old here with her   Reports she is moving to [place] next week and needs printed Rx's for everything  Hypertension has been well controlled on hydralazine and furosemide  Anxiety -patient takes Xanax as needed  States she needed more lately due to stress of moving and her  having colorectal cancer  PDMP reviewed is consistent      Migraines -she follows up with Neurology receives Botox injections  She missed her last Botox due to a scheduling issue  She also takes Fioricet as needed  Osteoporosis - on Fosamax  Last DEXA April 2021  The following portions of the patient's history were reviewed and updated as appropriate:    She  has a past medical history of Anxiety, Arthritis, Bradycardia, Depression, Edema, Head injury, Heart murmur, History of echocardiogram (07/18/2016), History of palpitations, Hyperlipidemia, Hypertension, Migraine, PVD (peripheral vascular disease) ([facility] [address] ), and Shortness of breath    She   Patient Active Problem List    Diagnosis Date Noted    Environmental allergies 05/12/2021    Asymmetrical hearing loss of right ear 2021    Tinnitus 2020    Bradycardia 2020    Mixed hyperlipidemia 2018    Numbness and tingling in right hand 10/25/2018    Carpal tunnel syndrome on left 10/25/2018    Migraine with aura and without status migrainosus, not intractable 2018    Chronic left shoulder pain 2018    Myofascial pain syndrome 2014    Anxiety 2014    Arthritis 2014    Hypertension 2014    Postconcussion syndrome 2014     She  has a past surgical history that includes  section; Colonoscopy; Tonsillectomy; pr esophagogastroduodenoscopy transoral diagnostic (N/A, 2017); and pr shldr arthroscop,surg,w/rotat cuff repr (Left, 2017)  Her family history includes Aneurysm in her father; Aortic aneurysm in her mother; Heart attack in her maternal uncle; Heart disease in her father, maternal uncle, maternal uncle, maternal uncle, maternal uncle, maternal uncle, and mother; Hypertension in her father; Migraines in her mother; Multiple sclerosis in her brother; No Known Problems in her daughter, maternal grandmother, paternal aunt, paternal aunt, paternal aunt, paternal aunt, and paternal grandmother  She  reports that she quit smoking about 31 years ago  She has never used smokeless tobacco  She reports previous alcohol use  She reports that she does not use drugs  Current Outpatient Medications   Medication Sig Dispense Refill    alendronate (FOSAMAX) 70 mg tablet Take 1 tablet (70 mg total) by mouth every 7 days Take on empty stomach in the morning   4 tablet 3    ALPRAZolam (XANAX) 0 25 mg tablet Take 1 tablet (0 25 mg total) by mouth 3 (three) times a day as needed for anxiety Usually takes 1/2 tab 25 tablet 0    BOTOX 100 units Inject 155 units intramuscularly every 90 days 200 Units 2    butalbital-acetaminophen-caffeine (FIORICET,ESGIC) -40 mg per tablet Take 1 tablet by mouth every 4 (four) hours as needed for headaches 30 tablet 0    fluticasone (FLONASE) 50 mcg/act nasal spray 1 spray into each nostril daily 16 g 1    furosemide (LASIX) 40 mg tablet Take 1 tablet (40 mg total) by mouth daily 90 tablet 0    hydrALAZINE (APRESOLINE) 50 mg tablet Take 1 tablet (50 mg total) by mouth 2 (two) times a day 180 tablet 0    Omega-3 Fatty Acids (Fish Oil) 500 MG CAPS Take by mouth 2 (two) times a day 2 Capsules       No current facility-administered medications for this visit  Current Outpatient Medications on File Prior to Visit   Medication Sig    BOTOX 100 units Inject 155 units intramuscularly every 90 days    fluticasone (FLONASE) 50 mcg/act nasal spray 1 spray into each nostril daily    Omega-3 Fatty Acids (Fish Oil) 500 MG CAPS Take by mouth 2 (two) times a day 2 Capsules    [DISCONTINUED] albuterol (Proventil HFA) 90 mcg/act inhaler Inhale 2 puffs every 6 (six) hours as needed for wheezing or shortness of breath    [DISCONTINUED] alendronate (FOSAMAX) 70 mg tablet Take 1 tablet (70 mg total) by mouth every 7 days Take on empty stomach in the morning   [DISCONTINUED] ALPRAZolam (XANAX) 0 25 mg tablet Take 1 tablet (0 25 mg total) by mouth 3 (three) times a day as needed for anxiety Usually takes 1/2 tab    [DISCONTINUED] brompheniramine-pseudoephedrine-DM 30-2-10 MG/5ML syrup Take 5 mL by mouth 4 (four) times a day as needed for congestion or cough    [DISCONTINUED] butalbital-acetaminophen-caffeine (FIORICET,ESGIC) -40 mg per tablet Take 1 tablet by mouth every 4 (four) hours as needed for headaches    [DISCONTINUED] furosemide (LASIX) 40 mg tablet Take 1 tablet (40 mg total) by mouth daily    [DISCONTINUED] hydrALAZINE (APRESOLINE) 50 mg tablet Take 1 tablet (50 mg total) by mouth 2 (two) times a day     No current facility-administered medications on file prior to visit       She is allergic to eggs or egg-derived products - food allergy, flu virus vaccine, iodine - food allergy, decadron [dexamethasone], advil [ibuprofen], naproxen, pravastatin, rosuvastatin, and sulfa antibiotics       Review of Systems   Constitutional: Negative for activity change, appetite change, chills, fatigue, fever and unexpected weight change  HENT: Negative for congestion, ear discharge, ear pain, postnasal drip, sinus pressure and sore throat  Eyes: Negative for discharge and visual disturbance  Respiratory: Negative for cough, shortness of breath and wheezing  Cardiovascular: Negative for chest pain, palpitations and leg swelling  Gastrointestinal: Negative for abdominal pain, constipation, diarrhea, nausea and vomiting  Endocrine: Negative for cold intolerance, heat intolerance, polydipsia and polyuria  Genitourinary: Negative for difficulty urinating and frequency  Musculoskeletal: Negative for arthralgias, back pain, joint swelling and myalgias  Skin: Negative for rash  Neurological: Positive for headaches  Negative for dizziness, weakness, light-headedness and numbness  Hematological: Negative for adenopathy  Psychiatric/Behavioral: Negative for behavioral problems, confusion, dysphoric mood, sleep disturbance and suicidal ideas  The patient is nervous/anxious  Objective:      /68 (BP Location: Left arm, Patient Position: Sitting, Cuff Size: Standard)   Pulse 87   Temp 97 8 °F (36 6 °C)   Ht 5' (1 524 m)   Wt 60 1 kg (132 lb 6 4 oz)   LMP  (LMP Unknown)   SpO2 96%   BMI 25 86 kg/m²          Physical Exam  Vitals and nursing note reviewed  Constitutional:       General: She is not in acute distress  Appearance: Normal appearance  She is not ill-appearing, toxic-appearing or diaphoretic  HENT:      Head: Normocephalic and atraumatic  Right Ear: External ear normal       Left Ear: External ear normal    Cardiovascular:      Rate and Rhythm: Normal rate and regular rhythm  Heart sounds: No murmur heard  No friction rub     Pulmonary:      Effort: Pulmonary effort is normal  No respiratory distress  Breath sounds: Normal breath sounds  No stridor  No wheezing, rhonchi or rales  Musculoskeletal:         General: No swelling  Right lower leg: No edema  Left lower leg: No edema  Neurological:      General: No focal deficit present  Mental Status: She is alert  Mental status is at baseline  Psychiatric:         Attention and Perception: Attention normal          Mood and Affect: Mood normal          Speech: Speech normal          Behavior: Behavior normal          Thought Content:  Thought content normal          Judgment: Judgment normal

## 2022-06-28 NOTE — TELEPHONE ENCOUNTER
Botox received at the [email or web address] on 9/26/18  Botox stored in the fridge until patient's upcoming appointment on 10/26/18 
botox arrived at the [facility] office 9/26/18
Vaccine status unknown

## 2022-10-11 NOTE — TELEPHONE ENCOUNTER
Scheduled patient for f/u 60 min appt with Dr Ariana Williamson on 3/31/19 [facility]  [employer] Moderna

## 2024-01-15 NOTE — TELEPHONE ENCOUNTER
Called [facility]- spoke with Dwight Zavala- she states the patient's Botox order went through the patient's insurance  Patient does have a co-pay of $150 00  They reach out to the patient today to enter in her payment for co-pay  Once this is completed Botox will be sent out to our office  Botox to be delivered on 2019 to the [facility] location- signature will be required  Demetra,    Please await Botox delivery and document once it has been received  Please let me know if you do not receive the patient's order      Thank you,    Ramonia Apgar The patient was contacted and she told me to talk to her son, Garth.  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 3 times daily x 10 days.    To call with ongoing issues or problems.  Does not have any history of immunosuppression.

## 2024-06-25 NOTE — TELEPHONE ENCOUNTER
Called [facility]- spoke with Janet Edmondson- I advised her I was calling to see if prior authorization is required for Botox  I provided her with the following information:    [place] Tax ID  Date of service  Aníbal and bill  She advised me that no authorization is required for this patient as she has a medicare supplemental plan       Call reference #: [id number]
pedro

## 2025-06-27 NOTE — PROGRESS NOTES
Chemodenervation     Date/Time 3/18/2021 12:28 PM     Performed by  Martine Primrose, MD     Authorized by Martine Primrose, MD      Post-procedure details      Chemodenervation:  Chronic migraine    Facial Nerve Location[de-identified]  Bilateral facial nerve        Chronic migraine headache      Two 100 unit vials of Botox both from lot number  [id number] with an expiration  November 2023 were utilized and diluted with 2 cc of normal saline to produce a 50 unit/cc dilution       Utilizing to aseptic technique with 30-gauge needle the following musdes were infiltrated with Botox:      5 Units - Procerius   5 Units -  R/L   5 Units - 2 Sites Each Side Frontalis R/L    5 Units - 4 Sites Each Side Temporalis R/L   5 Units - 3 Sites Each Side Occipitals R/L  5 Units - 2 Sites Each Side Cervical Parasp R/L   5 Units - 3 Sites Each Side Trapezius R/L      Total injected 155 Units  Total wasted   45 units     The patient tolerated the procedure well  There were no complications  Any minimal bleeding was controlled with compression   Patient will follow up in two weeks and anticipate repeating Botox in 3 months       Lilo Lyons MD 
Awake

## (undated) DEVICE — NEEDLE SPINAL18G X 3.5 IN QUINCKE

## (undated) DEVICE — SUT ETHILON 3-0 PS-1 18 IN 1663G

## (undated) DEVICE — SCD SEQUENTIAL COMPRESSION COMFORT SLEEVE MEDIUM KNEE LENGTH: Brand: KENDALL SCD

## (undated) DEVICE — GLOVE SRG BIOGEL ECLIPSE 8

## (undated) DEVICE — LIGHT HANDLE COVER SLEEVE DISP BLUE STELLAR

## (undated) DEVICE — THREADED CLEAR CANNULA WITH OBTURATOR 7MM X 75MM

## (undated) DEVICE — GLOVE INDICATOR PI UNDERGLOVE SZ 8 BLUE

## (undated) DEVICE — SHOULDER STABILIZATION KIT,                                    DISPOSABLE 12 PER BOX

## (undated) DEVICE — BURR  OVAL 4MM 13CM 8 FLUTE COOLCUT

## (undated) DEVICE — CHLORAPREP HI-LITE 26ML ORANGE

## (undated) DEVICE — OCCLUSIVE GAUZE STRIP,3% BISMUTH TRIBROMOPHENATE IN PETROLATUM BLEND: Brand: XEROFORM

## (undated) DEVICE — TUBING SUCTION 5MM X 12 FT

## (undated) DEVICE — DISPOSABLE EQUIPMENT COVER: Brand: SMALL TOWEL DRAPE

## (undated) DEVICE — INTENDED FOR TISSUE SEPARATION, AND OTHER PROCEDURES THAT REQUIRE A SHARP SURGICAL BLADE TO PUNCTURE OR CUT.: Brand: BARD-PARKER ® CARBON RIB-BACK BLADES

## (undated) DEVICE — VAPR COOLPULSE 90 ELECTRODE 90 DEGREES SUCTION WITH INTEGRATED HANDPIECE: Brand: VAPR COOLPULSE

## (undated) DEVICE — BLADE SHAVER CUTTER BN 4MM 13CM COOLCUT

## (undated) DEVICE — SPONGE PVP SCRUB WING STERILE

## (undated) DEVICE — PACK MAJOR ORTHO W/SPLITS PBDS

## (undated) DEVICE — IMPERVIOUS STOCKINETTE: Brand: DEROYAL

## (undated) DEVICE — SYRINGE 20ML LL

## (undated) DEVICE — TUBING ARTHROSCOPY REDUCE PATIENT

## (undated) DEVICE — ABDOMINAL PAD: Brand: DERMACEA

## (undated) DEVICE — GAUZE SPONGES,16 PLY: Brand: CURITY

## (undated) DEVICE — POSITIONER BEACH CHAIR FOAM KIT